# Patient Record
Sex: FEMALE | Race: WHITE | NOT HISPANIC OR LATINO | Employment: OTHER | URBAN - METROPOLITAN AREA
[De-identification: names, ages, dates, MRNs, and addresses within clinical notes are randomized per-mention and may not be internally consistent; named-entity substitution may affect disease eponyms.]

---

## 2018-01-10 ENCOUNTER — OFFICE VISIT CONVERTED (OUTPATIENT)
Dept: PODIATRY | Facility: CLINIC | Age: 53
End: 2018-01-10
Attending: PODIATRIST

## 2018-01-10 ENCOUNTER — CONVERSION ENCOUNTER (OUTPATIENT)
Dept: PODIATRY | Facility: CLINIC | Age: 53
End: 2018-01-10

## 2019-01-09 ENCOUNTER — HOSPITAL ENCOUNTER (OUTPATIENT)
Dept: CARDIOLOGY | Facility: HOSPITAL | Age: 54
Discharge: HOME | End: 2019-01-09
Attending: SURGERY

## 2019-09-12 ENCOUNTER — HOSPITAL ENCOUNTER (OUTPATIENT)
Dept: WOUND CARE | Facility: HOSPITAL | Age: 54
Setting detail: RECURRING SERIES
Discharge: STILL ADMITTED OR ADMINISTRATIVE | End: 2019-09-30
Attending: SURGERY

## 2019-09-15 LAB
AMOXICILLIN+CLAV SUSC ISLT: 16
AMOXICILLIN+CLAV SUSC ISLT: >=32
AMPICILLIN SUSC ISLT: 16
AMPICILLIN SUSC ISLT: >=32
AMPICILLIN+SULBAC SUSC ISLT: 16
AMPICILLIN+SULBAC SUSC ISLT: >=32
BACTERIA SPEC AEROBE CULT: ABNORMAL
CEFAZOLIN SUSC ISLT: >=64
CEFAZOLIN SUSC ISLT: >=64
CEFEPIME SUSC ISLT: <=1
CEFTAZIDIME SUSC ISLT: <=1
CEFTRIAXONE SUSC ISLT: <=1
CEFTRIAXONE SUSC ISLT: <=1
CEFUROXIME ORAL SUSC ISLT: <=1
CEFUROXIME ORAL SUSC ISLT: >=64
CEFUROXIME PARENTER SUSC ISLT: <=1
CEFUROXIME PARENTER SUSC ISLT: >=64
CIPROFLOXACIN SUSC ISLT: 0.5
CIPROFLOXACIN SUSC ISLT: 1
CIPROFLOXACIN SUSC ISLT: <=0.25
ERTAPENEM SUSC ISLT: <=0.5
ERTAPENEM SUSC ISLT: <=0.5
GENTAMICIN SUSC ISLT: 4
GENTAMICIN SUSC ISLT: <=1
GENTAMICIN SUSC ISLT: <=1
LEVOFLOXACIN SUSC ISLT: 1
LEVOFLOXACIN SUSC ISLT: 4
LEVOFLOXACIN SUSC ISLT: <=0.12
TETRACYCLINE SUSC ISLT: <=1
TETRACYCLINE SUSC ISLT: >=16
TMP SMX SUSC ISLT: <=20
TMP SMX SUSC ISLT: <=20
TOBRAMYCIN SUSC ISLT: 2
TOBRAMYCIN SUSC ISLT: <=1
TOBRAMYCIN SUSC ISLT: <=1

## 2019-10-14 ENCOUNTER — HOSPITAL ENCOUNTER (OUTPATIENT)
Dept: CARDIOLOGY | Facility: HOSPITAL | Age: 54
Discharge: HOME | End: 2019-10-14
Attending: SURGERY

## 2019-10-16 ENCOUNTER — HOSPITAL ENCOUNTER (OUTPATIENT)
Dept: MRI IMAGING | Facility: HOSPITAL | Age: 54
Discharge: HOME | End: 2019-10-16
Attending: SURGERY

## 2019-10-16 LAB
CREAT BLD-MCNC: 1.2 MG/DL (ref 0.6–1.4)
GFR SERPLBLD BASED ON 1.73 SQ M-ARVRAT: 50 ML/MIN/{1.73_M2}

## 2019-10-24 ENCOUNTER — HOSPITAL ENCOUNTER (OUTPATIENT)
Dept: WOUND CARE | Facility: HOSPITAL | Age: 54
Setting detail: RECURRING SERIES
Discharge: HOME | End: 2019-10-31
Attending: SURGERY

## 2019-12-10 ENCOUNTER — HOSPITAL ENCOUNTER (OUTPATIENT)
Dept: WOUND CARE | Facility: HOSPITAL | Age: 54
Setting detail: RECURRING SERIES
Discharge: HOME | End: 2019-12-31
Attending: SURGERY

## 2020-01-16 ENCOUNTER — HOSPITAL ENCOUNTER (OUTPATIENT)
Dept: WOUND CARE | Facility: HOSPITAL | Age: 55
Setting detail: RECURRING SERIES
Discharge: HOME | End: 2020-01-31
Attending: SURGERY

## 2020-02-26 ENCOUNTER — HOSPITAL ENCOUNTER (OUTPATIENT)
Dept: WOUND CARE | Facility: HOSPITAL | Age: 55
Setting detail: RECURRING SERIES
Discharge: HOME | End: 2020-05-26
Attending: SURGERY

## 2020-06-11 ENCOUNTER — HOSPITAL ENCOUNTER (OUTPATIENT)
Dept: WOUND CARE | Facility: HOSPITAL | Age: 55
Setting detail: RECURRING SERIES
Discharge: HOME | End: 2020-09-09
Attending: SURGERY

## 2020-08-21 ENCOUNTER — HOSPITAL ENCOUNTER (OUTPATIENT)
Dept: GENERAL RADIOLOGY | Facility: HOSPITAL | Age: 55
Discharge: HOME | End: 2020-08-21
Attending: SURGERY

## 2020-09-15 ENCOUNTER — HOSPITAL ENCOUNTER (OUTPATIENT)
Dept: WOUND CARE | Facility: HOSPITAL | Age: 55
Setting detail: RECURRING SERIES
Discharge: HOME | End: 2020-12-14
Attending: SURGERY

## 2020-09-28 ENCOUNTER — HOSPITAL ENCOUNTER (OUTPATIENT)
Dept: CARDIOLOGY | Facility: HOSPITAL | Age: 55
Discharge: HOME | End: 2020-09-28
Attending: SURGERY

## 2020-10-10 LAB
BACTERIA SPEC AEROBE CULT: ABNORMAL
CEFAZOLIN SUSC ISLT: >=64
CEFTAZIDIME SUSC ISLT: >=64
CIPROFLOXACIN SUSC ISLT: 1
CIPROFLOXACIN SUSC ISLT: <=0.5
CLINDAMYCIN SUSC ISLT: 0.25
CONV CEFTAZIDIME (BP): 256
CONV LEVOFLOXACIN (BP): 0.75
CONV MINOCYCLINE (BP): 0.75
CONV TICARCILLIN + CLAVULANATE (#) (BP): 256
CONV TRIMETHOPRIM + SULFAMETHOXAZOLE (#) (BP): 0.09
DAPTOMYCIN SUSC ISLT: 0.25
DOXYCYCLINE SUSC ISLT: <=0.5
ERYTHROMYCIN SUSC ISLT: 0.5
GENTAMICIN SUSC ISLT: >=16
GENTAMICIN SUSC ISLT: >=16
LEVOFLOXACIN SUSC ISLT: 0.5
LEVOFLOXACIN SUSC ISLT: 1
LEVOFLOXACIN SUSC ISLT: <=0.12
OXACILLIN SUSC ISLT: <=0.25
PIP+TAZO SUSC ISLT: 64
RIFAMPIN SUSC ISLT: <=0.5
TETRACYCLINE SUSC ISLT: <=1
TIGECYCLINE SUSC ISLT: <=0.12
TMP SMX SUSC ISLT: <=10
TMP SMX SUSC ISLT: <=20
TMP SMX SUSC ISLT: >=320
TOBRAMYCIN SUSC ISLT: >=16
VANCOMYCIN SUSC ISLT: <=0.5

## 2020-10-23 LAB
BACTERIA SPEC AEROBE CULT: ABNORMAL
CIPROFLOXACIN SUSC ISLT: <=0.5
CLINDAMYCIN SUSC ISLT: 0.25
DAPTOMYCIN SUSC ISLT: 0.25
DOXYCYCLINE SUSC ISLT: <=0.5
ERYTHROMYCIN SUSC ISLT: 0.5
GENTAMICIN SUSC ISLT: >=16
LEVOFLOXACIN SUSC ISLT: 0.25
OXACILLIN SUSC ISLT: <=0.25
RIFAMPIN SUSC ISLT: <=0.5
TETRACYCLINE SUSC ISLT: <=1
TIGECYCLINE SUSC ISLT: <=0.12
TMP SMX SUSC ISLT: 20
VANCOMYCIN SUSC ISLT: 1

## 2020-10-29 ENCOUNTER — HOSPITAL ENCOUNTER (OUTPATIENT)
Dept: CT IMAGING | Facility: HOSPITAL | Age: 55
Discharge: HOME | End: 2020-10-29
Attending: SURGERY

## 2020-11-02 LAB
GLUCOSE BLD-MCNC: 202 MG/DL (ref 65–99)
GLUCOSE BLD-MCNC: 246 MG/DL (ref 65–99)

## 2020-11-03 LAB
GLUCOSE BLD-MCNC: 144 MG/DL (ref 65–99)
GLUCOSE BLD-MCNC: 211 MG/DL (ref 65–99)

## 2020-11-05 ENCOUNTER — OFFICE VISIT CONVERTED (OUTPATIENT)
Dept: ORTHOPEDIC SURGERY | Facility: CLINIC | Age: 55
End: 2020-11-05
Attending: PHYSICIAN ASSISTANT

## 2020-11-06 ENCOUNTER — HOSPITAL ENCOUNTER (OUTPATIENT)
Dept: OTHER | Facility: HOSPITAL | Age: 55
Discharge: HOME | End: 2020-11-06
Attending: SURGERY

## 2020-11-06 LAB — PREALB SERPL-MCNC: 24.9 MG/DL (ref 20–40)

## 2020-11-09 LAB — GLUCOSE BLD-MCNC: 73 MG/DL (ref 65–99)

## 2020-11-10 LAB
GLUCOSE BLD-MCNC: 122 MG/DL (ref 65–99)
GLUCOSE BLD-MCNC: 83 MG/DL (ref 65–99)

## 2020-11-11 LAB
GLUCOSE BLD-MCNC: 101 MG/DL (ref 65–99)
GLUCOSE BLD-MCNC: 94 MG/DL (ref 65–99)

## 2020-11-12 LAB
GLUCOSE BLD-MCNC: 140 MG/DL (ref 65–99)
GLUCOSE BLD-MCNC: 165 MG/DL (ref 65–99)
GLUCOSE BLD-MCNC: 172 MG/DL (ref 65–99)

## 2020-11-13 LAB
GLUCOSE BLD-MCNC: 112 MG/DL (ref 65–99)
GLUCOSE BLD-MCNC: 126 MG/DL (ref 65–99)

## 2020-11-17 ENCOUNTER — OFFICE VISIT CONVERTED (OUTPATIENT)
Dept: ORTHOPEDIC SURGERY | Facility: CLINIC | Age: 55
End: 2020-11-17
Attending: PHYSICIAN ASSISTANT

## 2020-11-17 LAB
AMPICILLIN SUSC ISLT: <=0.25
BACTERIA SPEC AEROBE CULT: ABNORMAL
CEFAZOLIN SUSC ISLT: 32
CEFAZOLIN SUSC ISLT: >=64
CEFEPIME SUSC ISLT: <=0.12
CEFOTAXIME SUSC ISLT: <=0.12
CEFTAZIDIME SUSC ISLT: 4
CEFTAZIDIME SUSC ISLT: <=1
CEFTRIAXONE SUSC ISLT: <=0.12
CIPROFLOXACIN SUSC ISLT: 1
CIPROFLOXACIN SUSC ISLT: <=0.25
CLINDAMYCIN SUSC ISLT: >=1
ERTAPENEM SUSC ISLT: <=0.12
ERYTHROMYCIN SUSC ISLT: >=8
GENTAMICIN SUSC ISLT: <=1
GENTAMICIN SUSC ISLT: <=1
GLUCOSE BLD-MCNC: 76 MG/DL (ref 65–99)
GLUCOSE BLD-MCNC: 78 MG/DL (ref 65–99)
LEVOFLOXACIN SUSC ISLT: 1
LEVOFLOXACIN SUSC ISLT: 1
LEVOFLOXACIN SUSC ISLT: <=0.12
PENICILLIN G SUSC ISLT: <=0.06
PIP+TAZO SUSC ISLT: <=4
PIP+TAZO SUSC ISLT: <=4
TETRACYCLINE SUSC ISLT: >=16
TIGECYCLINE SUSC ISLT: <=0.06
TMP SMX SUSC ISLT: 160
TMP SMX SUSC ISLT: >=320
TOBRAMYCIN SUSC ISLT: <=1
TOBRAMYCIN SUSC ISLT: <=1
VANCOMYCIN SUSC ISLT: 0.25

## 2020-11-19 LAB — GLUCOSE BLD-MCNC: 120 MG/DL (ref 65–99)

## 2020-11-30 ENCOUNTER — HOSPITAL ENCOUNTER (OUTPATIENT)
Dept: CARDIOLOGY | Facility: HOSPITAL | Age: 55
Discharge: HOME | End: 2020-11-30
Attending: SURGERY

## 2020-12-07 LAB
GLUCOSE BLD-MCNC: 100 MG/DL (ref 65–99)
GLUCOSE BLD-MCNC: 108 MG/DL (ref 65–99)
GLUCOSE BLD-MCNC: 116 MG/DL (ref 65–99)

## 2020-12-08 ENCOUNTER — OFFICE VISIT CONVERTED (OUTPATIENT)
Dept: ORTHOPEDIC SURGERY | Facility: CLINIC | Age: 55
End: 2020-12-08
Attending: PHYSICIAN ASSISTANT

## 2020-12-17 ENCOUNTER — HOSPITAL ENCOUNTER (OUTPATIENT)
Dept: WOUND CARE | Facility: HOSPITAL | Age: 55
Setting detail: RECURRING SERIES
Discharge: HOME | End: 2021-03-17
Attending: SURGERY

## 2020-12-19 LAB — BACTERIA SPEC AEROBE CULT: NORMAL

## 2021-01-10 LAB
AMPICILLIN SUSC ISLT: <=0.25
BACTERIA SPEC AEROBE CULT: ABNORMAL
CEFOTAXIME SUSC ISLT: <=0.12
CEFTRIAXONE SUSC ISLT: <=0.12
CLINDAMYCIN SUSC ISLT: >=1
ERYTHROMYCIN SUSC ISLT: >=8
LEVOFLOXACIN SUSC ISLT: 0.5
PENICILLIN G SUSC ISLT: <=0.06
TETRACYCLINE SUSC ISLT: >=16
TIGECYCLINE SUSC ISLT: 0.12
VANCOMYCIN SUSC ISLT: 0.5

## 2021-05-10 NOTE — H&P
History and Physical      Patient Name: Nasima Dubose   Patient ID: [id number]   Sex: Female   YOB: 1965    Primary Care Provider: Jack Muir MD   Referring Provider: Jack Muir MD    Visit Date: November 5, 2020    Provider: Criselda Boucher PA-C   Location: [facility]   Location Address: [address] [place] [address]  [id number]   Location Phone: [phone]          Chief Complaint  · Right shoulder/arm injury      History Of Present Illness  Nasima Dubose is a 55 year old /White female who presents today to [facility].      She is here for evaluation of right shoulder injury. She fell two night ago when she was trying to go to the bathroom in the middle of the night. She had resultant right shoulder pain. She was evaluated at [facility] ED. X-rays revealed minimally displaced proximal humerus fracture of greater tuberosity and surgical neck.     Of note, patient is currently being treated for nonhealing wound of right foot, has a wound vac. She has PAD and diabetes.       Past Medical History  Ankle pain; Arthritis; Blood clot in vein; Corns and callus; Heart attack; High blood pressure; High cholesterol; Kidney problem; Numbness in feet; Stroke; Type 1 diabetes mellitus; Ulcer         Past Surgical History  Bladder Repair; C section; Cholecstectomy; Excision of ingrown toenail; Hernia Repair; Hysterectomy; Lithotripsy; Tonsilectomy         Medication List  Advair Diskus 100-50 mcg/dose inhalation blister with device; aspirin 81 mg oral tablet,delayed release (DR/EC); Claritin 10 mg oral tablet; fluticasone 50 mcg/actuation nasal spray,suspension; furosemide 40 mg oral tablet; gabapentin 600 mg oral tablet; Humalog 100 unit/mL subcutaneous solution; isosorbide mononitrate 60 mg oral tablet extended release 24 hr; lisinopril 10 mg oral tablet; metformin 1,000 mg oral tablet; metoprolol tartrate 50 mg oral tablet; Plavix 75 mg oral tablet; ProAir HFA 90 mcg/actuation  "inhalation HFA aerosol inhaler; simvastatin 40 mg oral tablet; Symbicort 160-4.5 mcg/actuation inhalation HFA aerosol inhaler; Vistaril 25 mg oral capsule         Allergy List  Neosporin       Allergies Reconciled  Family Medical History  Heart Disease; Diabetes Mellitus, Type II         Social History  Alcohol; Tobacco (Never)         Review of Systems  · Constitutional  o Denies  o : fever, chills, weight loss  · Cardiovascular  o Denies  o : chest pain, shortness of breath  · Gastrointestinal  o Denies  o : liver disease, heartburn, nausea, blood in stools  · Genitourinary  o Denies  o : painful urination, blood in urine  · Integument  o Denies  o : rash, itching  · Neurologic  o Denies  o : headache, weakness, loss of consciousness  · Musculoskeletal  o Admits  o : painful, swollen joints  · Psychiatric  o Denies  o : drug/alcohol addiction, anxiety, depression      Vitals  Date Time BP Position Site L\R Cuff Size HR RR TEMP (F) WT  HT  BMI kg/m2 BSA m2 O2 Sat FR L/min FiO2        11/05/2020 08:46 AM      56 - R   212lbs 16oz 5'  5.5\" 34.91 2.11 96 %            Physical Examination  · Constitutional  o Appearance  o : well developed, well-nourished, no obvious deformities present  · Head and Face  o Head  o :   § Inspection  § : normocephalic  o Face  o :   § Inspection  § : no facial lesions  · Eyes  o Conjunctivae  o : conjunctivae normal  o Sclerae  o : sclerae white  · Ears, Nose, Mouth and Throat  o Ears  o :   § External Ears  § : appearance within normal limits  § Hearing  § : intact  o Nose  o :   § External Nose  § : appearance normal  · Neck  o Inspection/Palpation  o : normal appearance  o Range of Motion  o : full range of motion  · Respiratory  o Respiratory Effort  o : breathing unlabored  o Inspection of Chest  o : normal appearance  o Auscultation of Lungs  o : no audible wheezing or rales  · Cardiovascular  o Heart  o : regular rate  · Gastrointestinal  o Abdominal Examination  o : soft and " non-tender  · Skin and Subcutaneous Tissue  o General Inspection  o : intact, no rashes  · Psychiatric  o General  o : Alert and oriented x3  o Judgement and Insight  o : judgment and insight intact  o Mood and Affect  o : mood normal, affect appropriate  · Right Shoulder  o Inspection  o : No discoloration. + swelling. Tender over proximal humerus. ROM limited. Sensation grossly intact. All compartments soft and well perfused. Good ROM of elbow, wrist, hand.           Assessment  · Pain: Arm     729.5/M79.603  · Pain: Shoulder     719.41/M25.519  · Proximal humerus fracture     812.00/S42.209A      Plan  · Medications  o Medications have been Reconciled  o Transition of Care or Provider Policy  · Instructions  o Reviewed the patient's Past Medical, Social, and Family history as well as the ROS at today's visit, no changes.  o Call or return if worsening symptoms.  o Continue sling. Follow up 10-14 days for repeat x-rays. Encouraged elbow, wrist digits ROM.  o Electronically Identified Patient Education Materials Provided Electronically            Electronically Signed by: DANI Oilvier-C -Author on November 5, 2020 09:34:09 AM  Electronically Co-signed by: Daisy Duque MD -Reviewer on November 5, 2020 01:26:02 PM

## 2021-05-13 NOTE — PROGRESS NOTES
Progress Note      Patient Name: Nasima Dubose   Patient ID: [id number]   Sex: Female   YOB: 1965    Primary Care Provider: Jack Muir MD   Referring Provider: Jack Muir MD    Visit Date: December 8, 2020    Provider: Criselda Boucher PA-C   Location: [facility]   Location Address: [address] [place] [address]  [id number]   Location Phone: [phone]          Chief Complaint  · right shoulder pain      History Of Present Illness  Nasima Dubose is a 55 year old /White female who presents today to [facility].      She is here for follow up for right proximal humerus fracture sustained 11/4/20. She has been using a sling. She states pain is improved. She has been doing some gentle ROM. She does have bilateral hand peripheral neuropathy. She has foot wound and wound vac in place and has [facility] home health.             Past Medical History  Anemia, Unspecified; Ankle pain; Arthritis; Bladder Disorder; Blood clot in vein; Congestive heart failure; Corns and callus; Diabetes; Heart attack; High blood pressure; High cholesterol; Hyperlipemia; Hypertension; Kidney problem; Limb Swelling; Numbness in feet; Seasonal allergies; Stroke; Type 1 diabetes mellitus; Ulcer         Past Surgical History  Bladder Repair; C section; Cesarian Section; Cholecstectomy; Colonoscopy; Excision of ingrown toenail; Gallbladder; Hernia; Hernia Repair; Hysterectomy; Lithotripsy; Tonsilectomy; Tonsillectomy         Medication List  Advair Diskus 100-50 mcg/dose inhalation blister with device; aspirin 81 mg oral tablet,delayed release (DR/EC); Claritin 10 mg oral tablet; fluticasone 50 mcg/actuation nasal spray,suspension; furosemide 40 mg oral tablet; gabapentin 600 mg oral tablet; Humalog 100 unit/mL subcutaneous solution; isosorbide mononitrate 60 mg oral tablet extended release 24 hr; lisinopril 10 mg oral tablet; metformin 1,000 mg oral tablet; metoprolol tartrate 50 mg oral tablet;  "Plavix 75 mg oral tablet; ProAir HFA 90 mcg/actuation inhalation HFA aerosol inhaler; simvastatin 40 mg oral tablet; Symbicort 160-4.5 mcg/actuation inhalation HFA aerosol inhaler; Vistaril 25 mg oral capsule         Allergy List  Neosporin       Allergies Reconciled  Family Medical History  Stroke; Heart Disease; Cancer, Unspecified; Diabetes, unspecified type; Diabetes Mellitus, Type II; Osteoporosis; Family history of Arthritis         Social History  Alcohol; Alcohol Use; Claustophobic (Unknown); .; lives with children; Recreational Drug Use (Never); Tobacco (Former); Unemployed.         Review of Systems  · Constitutional  o Denies  o : fever, chills, weight loss  · Cardiovascular  o Denies  o : chest pain, shortness of breath  · Gastrointestinal  o Denies  o : liver disease, heartburn, nausea, blood in stools  · Genitourinary  o Denies  o : painful urination, blood in urine  · Integument  o Denies  o : rash, itching  · Neurologic  o Denies  o : headache, weakness, loss of consciousness  · Musculoskeletal  o Admits  o : painful, swollen joints  · Psychiatric  o Denies  o : drug/alcohol addiction, anxiety, depression      Vitals  Date Time BP Position Site L\R Cuff Size HR RR TEMP (F) WT  HT  BMI kg/m2 BSA m2 O2 Sat FR L/min FiO2 HC       12/08/2020 02:29 PM         203lbs 0oz 5'  5.5\" 33.27 2.06             Physical Examination  · Constitutional  o Appearance  o : well developed, well-nourished, no obvious deformities present  · Head and Face  o Head  o :   § Inspection  § : normocephalic  o Face  o :   § Inspection  § : no facial lesions  · Eyes  o Conjunctivae  o : conjunctivae normal  o Sclerae  o : sclerae white  · Ears, Nose, Mouth and Throat  o Ears  o :   § External Ears  § : appearance within normal limits  § Hearing  § : intact  o Nose  o :   § External Nose  § : appearance normal  · Neck  o Inspection/Palpation  o : normal appearance  o Range of Motion  o : full range of " motion  · Respiratory  o Respiratory Effort  o : breathing unlabored  o Inspection of Chest  o : normal appearance  o Auscultation of Lungs  o : no audible wheezing or rales  · Cardiovascular  o Heart  o : regular rate  · Gastrointestinal  o Abdominal Examination  o : soft and non-tender  · Skin and Subcutaneous Tissue  o General Inspection  o : intact, no rashes  · Psychiatric  o General  o : Alert and oriented x3  o Judgement and Insight  o : judgment and insight intact  o Mood and Affect  o : mood normal, affect appropriate  · Right Shoulder  o Inspection  o : Swollen. Tender. ROM limited. Full ROM elbow, wrist digits. Sensation intact. Distal pulses 2+.               Assessment  · Right shoulder pain, unspecified chronicity     719.41/M25.511  · Proximal humerus fracture     812.00/S42.209A      Plan  · Orders  o Scapula (Right) [facility] Preferred View ([id number]) - 719.41/M25.511 - 12/08/2020  · Medications  o Medications have been Reconciled  o Transition of Care or Provider Policy  · Instructions  o Reviewed the patient's Past Medical, Social, and Family history as well as the ROS at today's visit, no changes.  o Call or return if worsening symptoms.  o Start home PT/OT Follow up in 4 weeks.             Electronically Signed by: Criselda Boucher PA-C -Author on December 8, 2020 02:53:46 PM

## 2021-05-13 NOTE — PROGRESS NOTES
Progress Note      Patient Name: Nasima Dubose   Patient ID: [id number]   Sex: Female   YOB: 1965    Primary Care Provider: Jack Muir MD   Referring Provider: Jack Muir MD    Visit Date: November 17, 2020    Provider: Criselda Boucher PA-C   Location: [facility]   Location Address: [address] [place] [address]  [id number]   Location Phone: [phone]          Chief Complaint  · Follow up right shoulder/arm pain      History Of Present Illness  Nasima Dubose is a 55 year old /White female who presents today to [facility].      She is here for follow up for right proximal humerus fracture sustained 11/4/20. She has been using a sling. She states pain is improved. She does have bilateral hand peripheral neuropathy. She has foot wound and wound vac in place.       Past Medical History  Anemia, Unspecified; Ankle pain; Arthritis; Bladder Disorder; Blood clot in vein; Congestive heart failure; Corns and callus; Diabetes; Heart attack; High blood pressure; High cholesterol; Hyperlipemia; Hypertension; Kidney problem; Limb Swelling; Numbness in feet; Seasonal allergies; Stroke; Type 1 diabetes mellitus; Ulcer         Past Surgical History  Bladder Repair; C section; Cesarian Section; Cholecstectomy; Colonoscopy; Excision of ingrown toenail; Gallbladder; Hernia; Hernia Repair; Hysterectomy; Lithotripsy; Tonsilectomy; Tonsillectomy         Medication List  Advair Diskus 100-50 mcg/dose inhalation blister with device; aspirin 81 mg oral tablet,delayed release (DR/EC); Claritin 10 mg oral tablet; fluticasone 50 mcg/actuation nasal spray,suspension; furosemide 40 mg oral tablet; gabapentin 600 mg oral tablet; Humalog 100 unit/mL subcutaneous solution; isosorbide mononitrate 60 mg oral tablet extended release 24 hr; lisinopril 10 mg oral tablet; metformin 1,000 mg oral tablet; metoprolol tartrate 50 mg oral tablet; Plavix 75 mg oral tablet; ProAir HFA 90 mcg/actuation  "inhalation HFA aerosol inhaler; simvastatin 40 mg oral tablet; Symbicort 160-4.5 mcg/actuation inhalation HFA aerosol inhaler; Vistaril 25 mg oral capsule         Allergy List  Neosporin       Allergies Reconciled  Family Medical History  Stroke; Heart Disease; Cancer, Unspecified; Diabetes, unspecified type; Diabetes Mellitus, Type II; Osteoporosis; Family history of Arthritis         Social History  Alcohol; Alcohol Use (Never); Claustophobic (Unknown); .; lives with children; Recreational Drug Use (Never); Tobacco (Former); Unemployed.         Review of Systems  · Constitutional  o Denies  o : fever, chills, weight loss  · Cardiovascular  o Denies  o : chest pain, shortness of breath  · Gastrointestinal  o Denies  o : liver disease, heartburn, nausea, blood in stools  · Genitourinary  o Denies  o : painful urination, blood in urine  · Integument  o Denies  o : rash, itching  · Neurologic  o Denies  o : headache, weakness, loss of consciousness  · Musculoskeletal  o Admits  o : painful, swollen joints  · Psychiatric  o Denies  o : drug/alcohol addiction, anxiety, depression      Vitals  Date Time BP Position Site L\R Cuff Size HR RR TEMP (F) WT  HT  BMI kg/m2 BSA m2 O2 Sat FR L/min FiO2        11/17/2020 08:37 AM      65 - R   212lbs 16oz 5'  5.5\" 34.91 2.11 94 %            Physical Examination  · Constitutional  o Appearance  o : well developed, well-nourished, no obvious deformities present  · Head and Face  o Head  o :   § Inspection  § : normocephalic  o Face  o :   § Inspection  § : no facial lesions  · Eyes  o Conjunctivae  o : conjunctivae normal  o Sclerae  o : sclerae white  · Ears, Nose, Mouth and Throat  o Ears  o :   § External Ears  § : appearance within normal limits  § Hearing  § : intact  o Nose  o :   § External Nose  § : appearance normal  · Neck  o Inspection/Palpation  o : normal appearance  o Range of Motion  o : full range of motion  · Respiratory  o Respiratory Effort  o : " breathing unlabored  o Inspection of Chest  o : normal appearance  o Auscultation of Lungs  o : no audible wheezing or rales  · Cardiovascular  o Heart  o : regular rate  · Gastrointestinal  o Abdominal Examination  o : soft and non-tender  · Skin and Subcutaneous Tissue  o General Inspection  o : intact, no rashes  · Psychiatric  o General  o : Alert and oriented x3  o Judgement and Insight  o : judgment and insight intact  o Mood and Affect  o : mood normal, affect appropriate  · Right Shoulder  o Inspection  o : Swollen. Tender. ROM limited. Full ROM elbow, wrist digits. Sensation intact. Distal pulses 2+.  · In Office Procedures  o View  o : AP/LATERAL  o Site  o : right, shoulder   o Indication  o : Right shoulder pain   o Study  o : X-rays ordered, taken in the office, and reviewed today.  o Xray  o : Stable fracture of surgical neck and greater tuberosity of humerus with minimal displacement  o Comparative Data  o : Comparative Data found and reviewed today           Assessment  · Pain: Shoulder     719.41/M25.519  · Proximal humerus fracture     812.00/S42.209A      Plan  · Orders  o Shoulder (Right) [facility] Preferred View ([id number]) - 719.41/M25.519 - 11/17/2020  · Medications  o Medications have been Reconciled  o Transition of Care or Provider Policy  · Instructions  o Reviewed the patient's Past Medical, Social, and Family history as well as the ROS at today's visit, no changes.  o Call or return if worsening symptoms.  o Continue sling, with removal for ROM exercises, which I demonstrated. Follow up 2-3 weeks, repeat x-rays. Consider starting home PT.   o Electronically Identified Patient Education Materials Provided Electronically            Electronically Signed by: DANI Olivier-C -Author on November 17, 2020 09:17:18 AM  Electronically Co-signed by: Daisy Duque MD -Reviewer on November 17, 2020 03:07:19 PM

## 2021-05-14 VITALS — WEIGHT: 213 LBS | HEART RATE: 65 BPM | HEIGHT: 65 IN | BODY MASS INDEX: 35.49 KG/M2 | OXYGEN SATURATION: 94 %

## 2021-05-14 VITALS — OXYGEN SATURATION: 96 % | HEIGHT: 65 IN | BODY MASS INDEX: 35.49 KG/M2 | HEART RATE: 56 BPM | WEIGHT: 213 LBS

## 2021-05-14 VITALS — WEIGHT: 203 LBS | BODY MASS INDEX: 33.82 KG/M2 | HEIGHT: 65 IN

## 2021-05-16 VITALS — WEIGHT: 192 LBS | BODY MASS INDEX: 31.99 KG/M2 | HEART RATE: 72 BPM | HEIGHT: 65 IN | OXYGEN SATURATION: 98 %

## 2021-06-29 ENCOUNTER — OFFICE VISIT (OUTPATIENT)
Dept: WOUND CARE | Facility: HOSPITAL | Age: 56
End: 2021-06-29

## 2021-06-29 VITALS
DIASTOLIC BLOOD PRESSURE: 86 MMHG | WEIGHT: 213 LBS | HEART RATE: 60 BPM | TEMPERATURE: 96.7 F | HEIGHT: 65 IN | RESPIRATION RATE: 16 BRPM | BODY MASS INDEX: 35.49 KG/M2 | SYSTOLIC BLOOD PRESSURE: 134 MMHG

## 2021-06-29 DIAGNOSIS — L97.512 DIABETIC ULCER OF OTHER PART OF RIGHT FOOT ASSOCIATED WITH TYPE 2 DIABETES MELLITUS, WITH FAT LAYER EXPOSED (HCC): Primary | ICD-10-CM

## 2021-06-29 DIAGNOSIS — E11.621 DIABETIC ULCER OF OTHER PART OF RIGHT FOOT ASSOCIATED WITH TYPE 2 DIABETES MELLITUS, WITH FAT LAYER EXPOSED (HCC): Primary | ICD-10-CM

## 2021-06-29 PROCEDURE — 97597 DBRDMT OPN WND 1ST 20 CM/<: CPT | Performed by: SURGERY

## 2021-06-29 PROCEDURE — G0463 HOSPITAL OUTPT CLINIC VISIT: HCPCS | Performed by: SURGERY

## 2021-06-29 RX ORDER — FUROSEMIDE 40 MG/1
40 TABLET ORAL 2 TIMES DAILY
COMMUNITY

## 2021-06-29 RX ORDER — ALBUTEROL SULFATE 90 UG/1
2 AEROSOL, METERED RESPIRATORY (INHALATION) EVERY 4 HOURS PRN
COMMUNITY

## 2021-06-29 RX ORDER — CLOPIDOGREL BISULFATE 75 MG/1
75 TABLET ORAL DAILY
COMMUNITY
End: 2022-01-07

## 2021-06-29 RX ORDER — INSULIN GLARGINE 100 [IU]/ML
56 INJECTION, SOLUTION SUBCUTANEOUS NIGHTLY
COMMUNITY
End: 2022-01-07

## 2021-06-29 RX ORDER — INSULIN ASPART 100 [IU]/ML
10 INJECTION, SOLUTION INTRAVENOUS; SUBCUTANEOUS
COMMUNITY

## 2021-06-29 RX ORDER — HYDROCODONE BITARTRATE AND ACETAMINOPHEN 7.5; 325 MG/1; MG/1
1 TABLET ORAL EVERY 8 HOURS
COMMUNITY
Start: 2021-06-15

## 2021-06-29 RX ORDER — HYDROXYZINE PAMOATE 25 MG/1
25 CAPSULE ORAL 3 TIMES DAILY PRN
Status: ON HOLD | COMMUNITY
End: 2022-01-15

## 2021-06-29 RX ORDER — METOPROLOL TARTRATE 50 MG/1
50 TABLET, FILM COATED ORAL DAILY
COMMUNITY
End: 2022-01-07

## 2021-06-29 RX ORDER — INSULIN DEGLUDEC 200 U/ML
INJECTION, SOLUTION SUBCUTANEOUS
Status: ON HOLD | COMMUNITY
Start: 2021-06-21 | End: 2022-01-15

## 2021-06-29 RX ORDER — GABAPENTIN 800 MG/1
800 TABLET ORAL 3 TIMES DAILY
COMMUNITY

## 2021-06-29 RX ORDER — SIMVASTATIN 40 MG
40 TABLET ORAL NIGHTLY
COMMUNITY

## 2021-06-29 RX ORDER — LISINOPRIL 10 MG/1
10 TABLET ORAL 2 TIMES DAILY
Status: ON HOLD | COMMUNITY
End: 2022-01-15

## 2021-06-29 RX ORDER — PREDNISONE 20 MG/1
10 TABLET ORAL
COMMUNITY
End: 2022-01-07

## 2021-06-29 NOTE — PROGRESS NOTES
"Chief Complaint  Wound Check (DIABETIC ULCER RIGHT FOOT (BS:158))    Subjective            Objective     Vitals:    06/29/21 0900   BP: 134/86   BP Location: Left arm   Patient Position: Sitting   Pulse: 60   Resp: 16   Temp: 96.7 °F (35.9 °C)   TempSrc: Temporal   Weight: 96.6 kg (213 lb)   Height: 165.1 cm (65\")   PainSc: 0-No pain         I have reviewed the HPI and ROS as documented by MA/RN. Theo Rollins MD    Physical Exam     Wound Description:  Diabetic ulcer plantar surface of the right foot is markedly improved since her last visit.  The wound is smaller and there is less callus tissue around the wound site.  The wound now is quite superficial.  While in the office I did excise the callus from around the wound site.  The wound itself which measures just a little over 1 cm was silver nitrated.  The patient has done well with the Aquacel patches and I have asked her to continue that until I see her again I have asked that she return to the clinic in 3 weeks.    Result Review :   The following data was reviewed by: Theo Rollins MD on 06/29/2021:      Wound debridement  Performed by: Theo Rollins MD  Authorized by: Theo Rollins MD     Correct patient: Identification verified by two methods:     Verbally and Date of birth  Correct equipment as applicable    How many wounds are you performing a debridement on?:  1  Wound 1:     Provider Documentation    Debridement type:  Sharp/excisional    Other:  Alcohol    Other:  10.  Scalpel     None    Tissue debrided:  Moderate    Type tissue:  Slough and Viable/Margins    Level removed:  Shallow Full    Patient tolerance:  Good    Hemostasis achieved by:  Silver nitrate     TSA: 4cm squared debridement to subcutaneous tissue       Assessment and Plan    Diagnoses and all orders for this visit:    1. Diabetic ulcer of other part of right foot associated with type 2 diabetes mellitus, with fat layer exposed (CMS/HCC) (Primary)    Other orders  -     " Wound debridement            Follow Up   Return in about 3 weeks (around 7/20/2021).  Patient was given instructions and counseling regarding her condition or for health maintenance advice. Please see specific information pulled into the AVS if appropriate.

## 2021-06-29 NOTE — SIGNIFICANT NOTE
Epithelial %: 25-50%    Exposed Bone: no    Exposed Tendon: no    Impression: improved    Short Term Goals: INCREASE GRANULATION AND DECREASE SIZE

## 2021-07-26 ENCOUNTER — OFFICE VISIT (OUTPATIENT)
Dept: WOUND CARE | Facility: HOSPITAL | Age: 56
End: 2021-07-26

## 2021-07-26 VITALS
BODY MASS INDEX: 35.49 KG/M2 | WEIGHT: 213 LBS | DIASTOLIC BLOOD PRESSURE: 84 MMHG | HEART RATE: 56 BPM | TEMPERATURE: 97.3 F | RESPIRATION RATE: 18 BRPM | HEIGHT: 65 IN | SYSTOLIC BLOOD PRESSURE: 134 MMHG

## 2021-07-26 DIAGNOSIS — L97.512 DIABETIC ULCER OF OTHER PART OF RIGHT FOOT ASSOCIATED WITH TYPE 2 DIABETES MELLITUS, WITH FAT LAYER EXPOSED (HCC): Primary | ICD-10-CM

## 2021-07-26 DIAGNOSIS — E11.621 DIABETIC ULCER OF OTHER PART OF RIGHT FOOT ASSOCIATED WITH TYPE 2 DIABETES MELLITUS, WITH FAT LAYER EXPOSED (HCC): Primary | ICD-10-CM

## 2021-07-26 PROCEDURE — 97597 DBRDMT OPN WND 1ST 20 CM/<: CPT | Performed by: SURGERY

## 2021-07-26 NOTE — PROGRESS NOTES
"Chief Complaint  Wound Check (DM RIGHT FOOT ULCER (BS: 74))    Subjective            Objective     Vitals:    21 1037   BP: 134/84   BP Location: Left arm   Patient Position: Sitting   Pulse: 56   Resp: 18   Temp: 97.3 °F (36.3 °C)   TempSrc: Temporal   Weight: 96.6 kg (213 lb)   Height: 165.1 cm (65\")   PainSc: 0-No pain         I have reviewed the HPI and ROS as documented by MA/RN. Theo Rollins MD    Physical Exam     Wound Description:  Diabetic ulcer plantar surface of the right foot is much smaller.  The wound site is very superficial.  There is callus around the wound site which I excised today.  The patient continues to use Aquacel patch to the wound on a daily basis.  I have asked her to continue that treatment and return to see me in 3 weeks.    Result Review :   The following data was reviewed by: Thoe Rollins MD on 2021:      Wound debridement  Performed by: Theo Rollins MD  Authorized by: Theo Rollins MD   Associated Wounds:   Wound 21 1328 Right medial foot    Correct patient: Identification verified by two methods:     Verbally and Date of birth  Correct procedure/consent    How many wounds are you performing a debridement on?:  1  Wound 1:     Nursing Documentation:     Wound Location:  RIGHT FOOT  Measurements:     Length:  1.7 cm    Width:  1.7 cm    Depth:  0.2 cm    The total amount debrided on this wound was under 20 cm2.     Tunnelin    Underminin    Culture: No      Stage/Type:  FULL    Photo: Yes    Legend:    Granulation %:     %: 4     Epithelial %:     1-25%: 2     Slough %:     0%: 1     Eschar %:     0%: 1      Exposed bone: No      Exposed tendon: No      Odor: No      Drainage: Yes      Characteristic:  Serous and Serosang    Drainage amount:  Moderate    Wound edges open: Yes      Periwound:  Callous    Impression:  Improved    Short Term Goal:  INCREASE GRANULATION AND DECREASE SIZE    Wound Cleanser:  NORMAL SALINE    Primary " Dressing:  SILVER NITRATE(APPLIED BY MD), AQUACEL AG    Secondary Dressing:  GAUZE, OPTIFOAM    Instruction given for:  Wound Care, Skin Care, Prevention and Nutrition    Provider Documentation    Debridement type:  Sharp/excisional    Other:  Alcohol    Other:  10.  Scalpel     None    Tissue debrided:  Moderate    Type tissue:  Slough    Level removed:  Shallow Full    Patient tolerance:  Good    Hemostasis achieved by:  Silver nitrate    Wound Bed Post Debridement: See Photo          TSA 2.89 cm squared of skin/ subcutaneous tissued debridement completed    Assessment and Plan    Diagnoses and all orders for this visit:    1. Diabetic ulcer of other part of right foot associated with type 2 diabetes mellitus, with fat layer exposed (CMS/HCC) (Primary)    Other orders  -     Wound debridement            Follow Up   Return in about 3 weeks (around 8/16/2021).  Patient was given instructions and counseling regarding her condition or for health maintenance advice. Please see specific information pulled into the AVS if appropriate.

## 2021-07-26 NOTE — SIGNIFICANT NOTE
Epithelial %: 0%    Exposed Bone: no    Exposed Tendon: no    Impression: improved    Short Term Goals: INCREASE GRANULATION AND DECREASE SIZE

## 2021-08-23 ENCOUNTER — OFFICE VISIT (OUTPATIENT)
Dept: WOUND CARE | Facility: HOSPITAL | Age: 56
End: 2021-08-23

## 2021-08-23 VITALS
HEART RATE: 58 BPM | HEIGHT: 65 IN | SYSTOLIC BLOOD PRESSURE: 132 MMHG | WEIGHT: 213 LBS | TEMPERATURE: 96.9 F | DIASTOLIC BLOOD PRESSURE: 80 MMHG | RESPIRATION RATE: 16 BRPM | BODY MASS INDEX: 35.49 KG/M2

## 2021-08-23 DIAGNOSIS — L97.512 DIABETIC ULCER OF OTHER PART OF RIGHT FOOT ASSOCIATED WITH TYPE 2 DIABETES MELLITUS, WITH FAT LAYER EXPOSED (HCC): Primary | ICD-10-CM

## 2021-08-23 DIAGNOSIS — E11.621 DIABETIC ULCER OF OTHER PART OF RIGHT FOOT ASSOCIATED WITH TYPE 2 DIABETES MELLITUS, WITH FAT LAYER EXPOSED (HCC): Primary | ICD-10-CM

## 2021-08-23 PROCEDURE — 97597 DBRDMT OPN WND 1ST 20 CM/<: CPT | Performed by: EMERGENCY MEDICINE

## 2021-08-23 NOTE — PROGRESS NOTES
Chief Complaint  Wound Check (DM RIGHT FOOT ULCER)    Subjective        History of Present Illness    Is a 56-year-old female who has been followed by the wound care clinic for a RLE diabetic foot ulcer.  She has been applying Aquacel Ag to this area and it has gradually been getting better.  Her diabetes is reasonably well controlled with her most recent hemoglobin A1c in April being 6.5.  She also has a history of peripheral vascular disease and has been seen by Dr. Henson in the past.    Allergies:  Aminoglycosides, Neosporin [neomycin-bacitracin zn-polymyx], Polymyxin b, and Pramoxine      Current Outpatient Medications:   •  albuterol sulfate HFA (ProAir HFA) 108 (90 Base) MCG/ACT inhaler, ProAir HFA 90 mcg/actuation inhalation HFA aerosol inhaler inhale 1 puff (90 mcg) by inhalation route every 6 hours as needed   Active, Disp: , Rfl:   •  clopidogrel (Plavix) 75 MG tablet, Take 75 mg by mouth Daily., Disp: , Rfl:   •  furosemide (LASIX) 40 MG tablet, Take 40 mg by mouth Daily., Disp: , Rfl:   •  gabapentin (NEURONTIN) 800 MG tablet, Take 800 mg by mouth 3 (Three) Times a Day., Disp: , Rfl:   •  HYDROcodone-acetaminophen (NORCO) 7.5-325 MG per tablet, Take 1 tablet by mouth Every 8 (Eight) Hours., Disp: , Rfl:   •  hydrOXYzine pamoate (Vistaril) 25 MG capsule, Take 25 mg by mouth 3 (Three) Times a Day As Needed for Anxiety., Disp: , Rfl:   •  insulin aspart (NovoLOG FlexPen) 100 UNIT/ML solution pen-injector sc pen, Novolog Flexpen U-100 Insulin aspart 100 unit/mL (3 mL) subcutaneous, Disp: , Rfl:   •  Insulin Glargine (BASAGLAR KWIKPEN) 100 UNIT/ML injection pen, Inject 56 Units under the skin into the appropriate area as directed Every Night., Disp: , Rfl:   •  lisinopril (PRINIVIL,ZESTRIL) 10 MG tablet, Take 10 mg by mouth 2 (two) times a day., Disp: , Rfl:   •  metFORMIN (GLUCOPHAGE) 1000 MG tablet, Take 1,000 mg by mouth 2 (two) times a day., Disp: , Rfl:   •  metoprolol tartrate (LOPRESSOR) 50 MG tablet,  Take 50 mg by mouth Daily., Disp: , Rfl:   •  predniSONE (DELTASONE) 20 MG tablet, Take 10 mg by mouth., Disp: , Rfl:   •  simvastatin (ZOCOR) 40 MG tablet, Take 40 mg by mouth Every Night., Disp: , Rfl:   •  Tresiba FlexTouch 200 UNIT/ML solution pen-injector pen injection, INJECT 66 UNITS SUBCUTANEOUSLY AT BEDTIME, Disp: , Rfl:     Past Medical History:   Diagnosis Date   • Anxiety     ANIXETY ATTACK AT WORK ABOUT A MONTH AGO   • Arthritis    • Condition not found     PSYCHIATRIC PROBLEMS- PT TOOK AN OVERDOSE OF MEDICATION FOR BLOOD SUGAR. TOOK 8 PILLS.   • Condition not found     HERBS USED- BC POWDER AS NEEDED   • Congestive heart failure (CHF) (CMS/HCC)    • COPD (chronic obstructive pulmonary disease) (CMS/HCC)    • Diabetes (CMS/HCC)    • Diabetic neuropathy (CMS/HCC)    • Health care home, active care coordination     [facility] (FAX: [phone])   • History of anesthesia reaction     HARD TIME WAKING UP SOMETIME   • History of hiatal hernia    • History of kidney stones    • Hyperlipidemia    • Hypertension    • Irregular heartbeat     PT IS SEEING  (RECENTLY DID A 24-HOUR HEART MONITOR).   • Peripheral artery disease (CMS/HCC)    • Stroke (CMS/HCC)     PIN STROKE FROMA MIGRAINE/ 20 YEARS AGO   • Wears glasses      Past Surgical History:   Procedure Laterality Date   • ABDOMINAL SURGERY      33 HERNIA REPAIR, GALLBLADER REMOVED   • BLADDER REPAIR     • CHOLECYSTECTOMY     • DILATATION AND CURETTAGE      X2   • HERNIA REPAIR     • HYSTERECTOMY  1992    2 CS   • TOE AMPUTATION Right 02/14/2017    RIGHT GREAT TOE   • TONSILLECTOMY       Social History     Socioeconomic History   • Marital status:      Spouse name: Not on file   • Number of children: Not on file   • Years of education: Not on file   • Highest education level: Not on file   Tobacco Use   • Smoking status: Former Smoker   • Smokeless tobacco: Never Used   • Tobacco comment: QUIT 4 YEARS AGO/ RECENLTY STARTED AGAIN 3 CIGS A DAY   "  Substance and Sexual Activity   • Alcohol use: Not Currently   • Drug use: Not Currently     Family History   Problem Relation Age of Onset   • Diabetes Mother    • Anesthesia problems Mother         HARD TIME WAKING UP SOMETIMES          Objective     Vitals:    08/23/21 1049   BP: 132/80   BP Location: Left arm   Patient Position: Sitting   Pulse: 58   Resp: 16   Temp: 96.9 °F (36.1 °C)   TempSrc: Temporal   Weight: 96.6 kg (213 lb)   Height: 165.1 cm (65\")   PainSc: 0-No pain     Body mass index is 35.45 kg/m².    STEADI Fall Risk Assessment has not been completed.     Review of Systems     ROS:  No fevers, chills, sweats, or body aches. No shortness of breath.     I have reviewed the HPI and ROS as documented by MA/RN. Shamika Whitehead MD    Physical Exam     NAD, well dressed, well nourished  Normocephalic, atraumatic  EOMI, no scleral icterus  No respiratory distress, no cough  AAOx3, pleasant, cooperative          Result Review :  The following data was reviewed by: Shamika Whitehead MD on 08/23/2021:    Wound Avatar Documentation    Active Wound LDAs             Wound 06/29/21 1328 Right medial foot    Placement date:   06/29/21   - 06/29/21 1328       Placement time:   1328   - 06/29/21 1328 Days:  54    Present on Hospital Admission:   Y   - 06/29/21 1328 Side:   Right   - 06/29/21 1328   Orientation:   medial   - 06/29/21 1328 Location:   foot   - 06/29/21 1328       Assessments         08/23/21 1155   08/23/21 1057   07/26/21 1139   07/26/21 1042   06/29/21 1340     (Flowsheet Note)  (Flowsheet Note)  (Flowsheet Note)  (Flowsheet Note)     Wound Image  Images linked: 1   - 08/23/21 1157     Images linked: 1   - 08/23/21 1059     Images linked: 1   - 07/26/21 1142     Images linked: 1   - 07/26/21 1049     Images linked: 1   - 06/29/21 1342      Dressing Appearance   other (see comments) POST DEBRIDEMENT   - 08/23/21 1157   moist drainage;intact   - 08/23/21 1059   moist " drainage;intact   - 07/26/21 1142   moist drainage;intact   - 07/26/21 1049   --    Closure   --    --    --    --    --    Base   --    --    --    --    --    Black (%), Wound Tissue Color   0   - 08/23/21 1157   0   - 08/23/21 1059   -- POST DEBRIDEMENT: SILVER NITRATE APPLIED BY MD   - 07/26/21 1142   0   - 07/26/21 1049   --    Red (%), Wound Tissue Color   100   - 08/23/21 1157   75   - 08/23/21 1059   -- POST DEBRIDEMENT: SILVER NITRATE APPLIED BY MD   - 07/26/21 1142   100   - 07/26/21 1049   100   - 06/29/21 1342   Yellow (%), Wound Tissue Color   0   - 08/23/21 1157   25   - 08/23/21 1059   -- POST DEBRIDEMENT: SILVER NITRATE APPLIED BY MD   - 07/26/21 1142   0   - 07/26/21 1049   --    Periwound   intact   - 08/23/21 1157   intact;dry   - 08/23/21 1059   intact   - 07/26/21 1142   dry;intact   - 07/26/21 1049   --    Periwound Temperature   warm   - 08/23/21 1157   warm   - 08/23/21 1059   warm   - 07/26/21 1142   warm   - 07/26/21 1049   warm   - 06/29/21 1342   Periwound Skin Turgor   soft   - 08/23/21 1157   firm   - 08/23/21 1059   soft   - 07/26/21 1142   firm   - 07/26/21 1049   firm   - 06/29/21 1342   Edges   open   - 08/23/21 1157   callused;open   - 08/23/21 1059   open   - 07/26/21 1142   open   - 07/26/21 1049   callused   - 06/29/21 1342   Wound Length (cm)   1.5 cm   - 08/23/21 1157   1.6 cm   - 08/23/21 1059   1.7 cm   - 07/26/21 1142   1.6 cm   - 07/26/21 1049   2 cm   -BA 06/29/21 1342   Wound Width (cm)   1.3 cm   -MADHAVI 08/23/21 1157   1.3 cm   -MADHAVI 08/23/21 1059   1.7 cm   -MADHAVI 07/26/21 1142   1.9 cm   -MADHAVI 07/26/21 1049   2 cm   -BA 06/29/21 1342   Wound Depth (cm)   0.2 cm   -MADHAVI 08/23/21 1157   0.2 cm   -MADHAVI 08/23/21 1059   0.2 cm   -MADHAVI 07/26/21 1142   0.3 cm   -MADHAVI 07/26/21 1049   0.2 cm   -BA 06/29/21 1342   Tunneling [Depth (cm)/Location]   0   -MADHAVI 08/23/21 1157   0   -MADHAVI 08/23/21 1059   0   -MADHAVI 07/26/21 1142   0    - 07/26/21 1049   --    Undermining [Depth (cm)/Location]   0   - 08/23/21 1157   0   - 08/23/21 1059   0   - 07/26/21 1142   0   - 07/26/21 1049   --    Drainage Characteristics/Odor   sanguineous   - 08/23/21 1157   serous;serosanguineous   - 08/23/21 1059   bleeding controlled   - 07/26/21 1142   serous;serosanguineous   - 07/26/21 1049   bleeding controlled   - 06/29/21 1342   Drainage Amount   small   - 08/23/21 1157   moderate   - 08/23/21 1059   none   - 07/26/21 1142   moderate   - 07/26/21 1049   --    Care, Wound   cleansed with;sterile normal saline   - 08/23/21 1157   cleansed with;sterile normal saline   - 08/23/21 1059   cleansed with;sterile normal saline   - 07/26/21 1142   cleansed with;sterile normal saline   - 07/26/21 1049   cleansed with;sterile normal saline   - 06/29/21 1342   Dressing Care   dressing applied;other (see comments) AQUACEL AG, MEPILEX   - 08/23/21 1157   --    dressing applied;other (see comments) SILVER NITRATE APPLIED BY MD, AQUACEL , GAUZE, OPTIFOAM   - 07/26/21 1142   --    dressing applied;hydrofiber;silver impregnated;gauze   - 06/29/21 1342   Periwound Care   dry periwound area maintained   - 08/23/21 1157   --    dry periwound area maintained   - 07/26/21 1142   --    --    Adhesive Closure Strips   --    --    --    --    --    Number of Adhesive Closure Strips   --    --    --    --    --    Sutures Removed Intact   --    --    --    --    --    Number of Sutures Removed   --    --    --    --    --    Staples Removed Intact   --    --    --    --    --    Number of Staples Removed   --    --    --    --    --    Wound Output (mL)   --    --    --    --    --            06/29/21 1329                     (Flowsheet Note)           Wound Image  Images linked: 1   -MADHAVI 06/29/21 1331              Dressing Appearance   moist drainage;intact   - 06/29/21 1331           Closure   --            Base   --            Black (%),  Wound Tissue Color   0   - 06/29/21 1331           Red (%), Wound Tissue Color   50   - 06/29/21 1331           Yellow (%), Wound Tissue Color   0   - 06/29/21 1331           Periwound   intact   - 06/29/21 1331           Periwound Temperature   warm   - 06/29/21 1331           Periwound Skin Turgor   firm   - 06/29/21 1331           Edges   callused   - 06/29/21 1331           Wound Length (cm)   2 cm   - 06/29/21 1331           Wound Width (cm)   2 cm   - 06/29/21 1331           Wound Depth (cm)   0.4 cm   - 06/29/21 1331           Tunneling [Depth (cm)/Location]   0   - 06/29/21 1331           Undermining [Depth (cm)/Location]   0   - 06/29/21 1331           Drainage Characteristics/Odor   serous   - 06/29/21 1331           Drainage Amount   moderate   - 06/29/21 1331           Care, Wound   cleansed with;sterile normal saline   - 06/29/21 1331           Dressing Care   --            Periwound Care   --            Adhesive Closure Strips   --            Number of Adhesive Closure Strips   --            Sutures Removed Intact   --            Number of Sutures Removed   --            Staples Removed Intact   --            Number of Staples Removed   --            Wound Output (mL)   --                           User Key  (r) = Recorded By, (t) = Taken By, (c) = Cosigned By    Initials Name Effective Dates Provider Type Discipline    Audra Henderson, RN 06/09/21 -  Registered Nurse Nurse    Zara Hemphill RN 06/09/21 -  Registered Nurse --                Wound debridement  Performed by: Shamika Whitehead MD  Authorized by: Shamika Whitehead MD   Associated Wounds:   Wound 06/29/21 1328 Right medial foot    Correct patient: Identification verified by two methods:     Verbally and Date of birth  Correct procedure/consent    Correct site/side    Correct equipment as applicable    How many wounds are you performing a debridement on?:  1  Wound 1:     Nursing Documentation:     Wound  Location:  RIGHT FOOT  Measurements:     Length:  1.5 cm    Width:  1.3 cm    Depth:  0.2 cm    The total amount debrided on this wound was under 20 cm2.     Underminin    Culture: No      Stage/Type:  FULL    Photo: Yes    Legend:    Granulation %:     %: 4     Epithelial %:     0%: 1     Slough %:     0%: 1     Eschar %:     0%: 1      Exposed bone: No      Exposed tendon: No      Odor: No      Drainage: Yes      Characteristic:  Serous and Serosang    Drainage amount:  Moderate    Wound edges open: Yes      Periwound:  Callous    Impression:  Improved    Short Term Goal:  INCREASE GRANULATION AND DECREASE SIZE    Wound Cleanser:  NORMAL SALINE    Primary Dressing:  AQUACEL AG    Secondary Dressing:  MEPILEX    Instruction given for:  Wound Care, Skin Care, Prevention and Nutrition    Provider Documentation    Debridement type:  Sharp/excisional    Betadine      Other:  Sterile 10 blade scalpel     None    Tissue debrided:  Small    Type tissue:  Other    Other tissue type:  Skin callus and nonviable epidermis    Level removed:  Partial    Patient tolerance:  Good    Hemostasis achieved by:  Direct pressure    Wound Bed Post Debridement: See Photo      Description:  Skin callus and nonviable epidermis                Assessment and Plan   Diagnoses and all orders for this visit:    1. Diabetic ulcer of other part of right foot associated with type 2 diabetes mellitus, with fat layer exposed (CMS/HCC) (Primary)  -     Wound debridement      Continue Aquacel Ag and trying to offload this area.  It appears to be improving and there is no evidence of infection.  Return in 3 weeks for recheck.    Patient was given instructions and counseling regarding their condition or for health maintenance advice, as well as the wound care plan and recommendations. They understand and agree with the plan.  They will follow back up here in the clinic but are instructed to contact us in the interim should they have any new,  returning, or worsening symptoms or concerns. Please see specific information pulled into the AVS if appropriate.       Follow Up   Return in about 3 weeks (around 9/13/2021) for Recheck.      Shamika Whitehead MD

## 2021-09-17 ENCOUNTER — OFFICE VISIT (OUTPATIENT)
Dept: WOUND CARE | Facility: HOSPITAL | Age: 56
End: 2021-09-17

## 2021-09-17 VITALS
SYSTOLIC BLOOD PRESSURE: 140 MMHG | TEMPERATURE: 97.1 F | HEART RATE: 73 BPM | HEIGHT: 65 IN | DIASTOLIC BLOOD PRESSURE: 82 MMHG | RESPIRATION RATE: 18 BRPM | BODY MASS INDEX: 35.49 KG/M2 | WEIGHT: 213 LBS

## 2021-09-17 DIAGNOSIS — L97.511 DIABETIC ULCER OF OTHER PART OF RIGHT FOOT ASSOCIATED WITH TYPE 2 DIABETES MELLITUS, LIMITED TO BREAKDOWN OF SKIN (HCC): Primary | ICD-10-CM

## 2021-09-17 DIAGNOSIS — E11.621 DIABETIC ULCER OF OTHER PART OF RIGHT FOOT ASSOCIATED WITH TYPE 2 DIABETES MELLITUS, LIMITED TO BREAKDOWN OF SKIN (HCC): Primary | ICD-10-CM

## 2021-09-17 PROCEDURE — 97597 DBRDMT OPN WND 1ST 20 CM/<: CPT | Performed by: SURGERY

## 2021-09-17 RX ORDER — METOPROLOL SUCCINATE 50 MG/1
50 TABLET, EXTENDED RELEASE ORAL DAILY
Status: ON HOLD | COMMUNITY
Start: 2021-09-03 | End: 2022-01-15

## 2021-09-17 NOTE — SIGNIFICANT NOTE
Epithelial %: %    Exposed Bone: no    Exposed Tendon: no    Impression: improved    Short Term Goals: INCREASE GRANULATION AND DECREASE SIZE

## 2021-09-17 NOTE — PROGRESS NOTES
"Chief Complaint  Wound Check (dm foot ulcer)    Subjective            Objective     Vitals:    09/17/21 1022   BP: 140/82   BP Location: Left arm   Patient Position: Sitting   Pulse: 73   Resp: 18   Temp: 97.1 °F (36.2 °C)   TempSrc: Temporal   Weight: 96.6 kg (213 lb)   Height: 165.1 cm (65\")   PainSc: 0-No pain         I have reviewed the HPI and ROS as documented by MA/RN. Theo Rollins MD    Physical Exam     Wound Description:  The diabetic ulcer on the plantar surface of the right foot is remarkably smaller today.  This wound is very superficial.  The callus around the wound site was excised.  The wound itself was silver nitrated.  I have asked the patient to continue to use Aquacel daily as she has been doing and with great success.  I have asked that she return to see me again in 3 weeks.  Result Review :   The following data was reviewed by: Theo Rollins MD on 09/17/2021:      Wound debridement  Performed by: Theo Rollins MD  Authorized by: Theo Rollins MD     Correct patient: Identification verified by two methods:     Verbally and Date of birth  Correct procedure/consent    How many wounds are you performing a debridement on?:  1  Wound 1:     Provider Documentation    Debridement type:  Sharp/excisional    Other:  Alcohol    Other:  15.  Scalpel     None    Tissue debrided:  Moderate    Type tissue:  Slough    Level removed:  Skin    Patient tolerance:  Good    Hemostasis achieved by:  Silver nitrate    Wound Bed Post Debridement: See Photo      Description:  Total surface area was 1.5 cm² with callus skin removed            Assessment and Plan    Diagnoses and all orders for this visit:    1. Diabetic ulcer of other part of right foot associated with type 2 diabetes mellitus, limited to breakdown of skin (CMS/HCC) (Primary)    Other orders  -     Wound debridement            Follow Up   Return in about 3 weeks (around 10/8/2021).  Patient was given instructions and counseling " regarding her condition or for health maintenance advice. Please see specific information pulled into the AVS if appropriate.

## 2021-10-08 ENCOUNTER — OFFICE VISIT (OUTPATIENT)
Dept: WOUND CARE | Facility: HOSPITAL | Age: 56
End: 2021-10-08

## 2021-10-08 VITALS
DIASTOLIC BLOOD PRESSURE: 88 MMHG | OXYGEN SATURATION: 92 % | TEMPERATURE: 97 F | RESPIRATION RATE: 18 BRPM | HEART RATE: 72 BPM | SYSTOLIC BLOOD PRESSURE: 152 MMHG

## 2021-10-08 DIAGNOSIS — L97.511 DIABETIC ULCER OF OTHER PART OF RIGHT FOOT ASSOCIATED WITH TYPE 2 DIABETES MELLITUS, LIMITED TO BREAKDOWN OF SKIN (HCC): Primary | ICD-10-CM

## 2021-10-08 DIAGNOSIS — E11.621 DIABETIC ULCER OF OTHER PART OF RIGHT FOOT ASSOCIATED WITH TYPE 2 DIABETES MELLITUS, LIMITED TO BREAKDOWN OF SKIN (HCC): Primary | ICD-10-CM

## 2021-10-08 PROCEDURE — 87205 SMEAR GRAM STAIN: CPT

## 2021-10-08 PROCEDURE — 87186 SC STD MICRODIL/AGAR DIL: CPT

## 2021-10-08 PROCEDURE — 87070 CULTURE OTHR SPECIMN AEROBIC: CPT

## 2021-10-08 PROCEDURE — 97597 DBRDMT OPN WND 1ST 20 CM/<: CPT | Performed by: SURGERY

## 2021-10-08 NOTE — PROGRESS NOTES
Chief Complaint  Wound Check (RIGHT FOOT ULCER, PLUS NEW AREA; DM, BG )    Subjective            Objective     Vitals:    10/08/21 1337   BP: 152/88   BP Location: Left arm   Patient Position: Sitting   Pulse: 72   Resp: 18   Temp: 97 °F (36.1 °C)   TempSrc: Temporal   SpO2: 92%         I have reviewed the HPI and ROS as documented by MA/RN. Theo Rollins MD    Physical Exam     Wound Description:  The diabetic ulcer on the plantar surface of the right foot continues to be smaller.  There is a large heapt of callus around the wound site which I did remove today.  Patient states there has been a little drainage from the wound and so the wound was cultured today.  I applied gentamicin to the wound after removing the callus today.  I do want the patient to continue with Aquacel patch on a daily basis.  Patient's been asked to return to see me in 2 weeks.  Result Review :   The following data was reviewed by: Theo Rollins MD on 10/08/2021:      Wound debridement  Performed by: Theo Rollins MD  Authorized by: Theo Rollins MD     Correct patient: Identification verified by two methods:     Verbally and Date of birth  Correct procedure/consent    How many wounds are you performing a debridement on?:  1  Wound 1:     Provider Documentation    Debridement type:  Sharp/excisional    Other:  Alcohol    Other:  15 scalpel     None    Tissue debrided:  Moderate    Type tissue:  Slough    Level removed:  Skin    Patient tolerance:  Good    Hemostasis achieved by:  Silver nitrate    Wound Bed Post Debridement: See Photo      Description:  Total surface area of the wound where a large element of callus was removed was 7.56 cm²            Assessment and Plan    Diagnoses and all orders for this visit:    1. Diabetic ulcer of other part of right foot associated with type 2 diabetes mellitus, limited to breakdown of skin (HCC) (Primary)    Other orders  -     Wound debridement            Follow Up   Return in  about 2 weeks (around 10/22/2021).  Patient was given instructions and counseling regarding her condition or for health maintenance advice. Please see specific information pulled into the AVS if appropriate.

## 2021-10-08 NOTE — SIGNIFICANT NOTE
Epithelial %: 0%    Exposed Bone: no    Exposed Tendon: no    Impression: worsening    Short Term Goals: INCREASE GRANULATION, DECREASE SIZE

## 2021-10-11 LAB
BACTERIA SPEC AEROBE CULT: ABNORMAL
BACTERIA SPEC AEROBE CULT: ABNORMAL
GRAM STN SPEC: ABNORMAL
GRAM STN SPEC: ABNORMAL

## 2021-10-15 DIAGNOSIS — E11.621 DIABETIC ULCER OF OTHER PART OF RIGHT FOOT ASSOCIATED WITH TYPE 2 DIABETES MELLITUS, LIMITED TO BREAKDOWN OF SKIN (HCC): Primary | ICD-10-CM

## 2021-10-15 DIAGNOSIS — L97.511 DIABETIC ULCER OF OTHER PART OF RIGHT FOOT ASSOCIATED WITH TYPE 2 DIABETES MELLITUS, LIMITED TO BREAKDOWN OF SKIN (HCC): Primary | ICD-10-CM

## 2021-10-15 RX ORDER — SULFAMETHOXAZOLE AND TRIMETHOPRIM 800; 160 MG/1; MG/1
1 TABLET ORAL 2 TIMES DAILY
Qty: 30 TABLET | Refills: 0 | Status: ON HOLD | OUTPATIENT
Start: 2021-10-15 | End: 2022-01-15

## 2021-10-15 NOTE — TELEPHONE ENCOUNTER
PER DR. DESOUZA PATIENT NEEDS TO BEGIN AN ANTIBIOTIC FOR HER POSITIVE WOUND CULTURE. CALLED PATIENT AND ADVISED HER OF WOUND CULTURE RESULTS AND THAT WE WILL BE SENDING IN AN ANTIBIOTIC TO HER PHARMACY.

## 2021-10-22 ENCOUNTER — OFFICE VISIT (OUTPATIENT)
Dept: WOUND CARE | Facility: HOSPITAL | Age: 56
End: 2021-10-22

## 2021-10-22 VITALS
TEMPERATURE: 97.1 F | SYSTOLIC BLOOD PRESSURE: 130 MMHG | HEART RATE: 78 BPM | DIASTOLIC BLOOD PRESSURE: 78 MMHG | OXYGEN SATURATION: 95 % | RESPIRATION RATE: 18 BRPM

## 2021-10-22 DIAGNOSIS — E11.621 DIABETIC ULCER OF OTHER PART OF RIGHT FOOT ASSOCIATED WITH TYPE 2 DIABETES MELLITUS, LIMITED TO BREAKDOWN OF SKIN (HCC): Primary | ICD-10-CM

## 2021-10-22 DIAGNOSIS — L97.511 DIABETIC ULCER OF OTHER PART OF RIGHT FOOT ASSOCIATED WITH TYPE 2 DIABETES MELLITUS, LIMITED TO BREAKDOWN OF SKIN (HCC): Primary | ICD-10-CM

## 2021-10-22 PROCEDURE — 97602 WOUND(S) CARE NON-SELECTIVE: CPT | Performed by: SURGERY

## 2021-10-22 PROCEDURE — 97597 DBRDMT OPN WND 1ST 20 CM/<: CPT | Performed by: SURGERY

## 2021-10-22 NOTE — PROGRESS NOTES
Chief Complaint  Wound Check (RIGHT FOOT ULCER, DM)    Subjective            Objective     Vitals:    10/22/21 1103   BP: 130/78   BP Location: Left arm   Patient Position: Sitting   Pulse: 78   Resp: 18   Temp: 97.1 °F (36.2 °C)   TempSrc: Temporal   PainSc: 4  Comment: OCCASSIONAL PAIN DURING THE DAY   PainLoc: Foot  Comment: RIGHT         I have reviewed the HPI and ROS as documented by MA/RN. Theo Rollins MD    Physical Exam     Wound Description:  The diabetic ulcer on the plantar surface of the patient's right foot continues to be smaller.  There is considerable amount of callus around the wound site which I excised today.  The wound itself as was the area of the callus was silver nitrated.  I have asked the patient to continue to apply Aquacel to the wound on a daily basis.    Result Review :   The following data was reviewed by: Theo Rollins MD on 10/22/2021:      Wound debridement  Performed by: Theo Rollins MD  Authorized by: Theo Rollins MD     Correct patient: Identification verified by two methods:     Date of birth  Correct procedure/consent    How many wounds are you performing a debridement on?:  1  Wound 1:     Provider Documentation    Debridement type:  Sharp/excisional    Other:  Alcohol    Other:  15.  Scalpel     None    Tissue debrided:  Moderate    Type tissue:  Slough    Level removed:  Skin    Patient tolerance:  Good    Hemostasis achieved by:  Silver nitrate    Wound Bed Post Debridement: See Photo      Description:  The total surface area of the wound with the callus tissue removed was 6.44 cm²            Assessment and Plan    Diagnoses and all orders for this visit:    1. Diabetic ulcer of other part of right foot associated with type 2 diabetes mellitus, limited to breakdown of skin (HCC) (Primary)    Other orders  -     Wound debridement            Follow Up   Return in about 2 weeks (around 11/5/2021).  Patient was given instructions and counseling regarding  her condition or for health maintenance advice. Please see specific information pulled into the AVS if appropriate.

## 2021-11-05 ENCOUNTER — OFFICE VISIT (OUTPATIENT)
Dept: WOUND CARE | Facility: HOSPITAL | Age: 56
End: 2021-11-05

## 2021-11-05 VITALS
SYSTOLIC BLOOD PRESSURE: 158 MMHG | TEMPERATURE: 96.8 F | DIASTOLIC BLOOD PRESSURE: 82 MMHG | RESPIRATION RATE: 18 BRPM | HEART RATE: 62 BPM | OXYGEN SATURATION: 96 %

## 2021-11-05 DIAGNOSIS — E11.621 DIABETIC ULCER OF OTHER PART OF RIGHT FOOT ASSOCIATED WITH TYPE 2 DIABETES MELLITUS, LIMITED TO BREAKDOWN OF SKIN (HCC): Primary | ICD-10-CM

## 2021-11-05 DIAGNOSIS — L97.511 DIABETIC ULCER OF OTHER PART OF RIGHT FOOT ASSOCIATED WITH TYPE 2 DIABETES MELLITUS, LIMITED TO BREAKDOWN OF SKIN (HCC): Primary | ICD-10-CM

## 2021-11-05 PROCEDURE — 97597 DBRDMT OPN WND 1ST 20 CM/<: CPT | Performed by: SURGERY

## 2021-11-05 PROCEDURE — 97602 WOUND(S) CARE NON-SELECTIVE: CPT | Performed by: SURGERY

## 2021-11-05 RX ORDER — PRASUGREL 10 MG/1
10 TABLET, FILM COATED ORAL DAILY
COMMUNITY
Start: 2021-09-29

## 2021-11-05 RX ORDER — MULTIVIT-MIN/IRON/FOLIC ACID/K 18-600-40
500 CAPSULE ORAL DAILY
COMMUNITY

## 2021-11-05 RX ORDER — LORATADINE 10 MG/1
10 TABLET ORAL DAILY
COMMUNITY
Start: 2021-10-05

## 2021-11-05 RX ORDER — PEN NEEDLE, DIABETIC 31 GX5/16"
NEEDLE, DISPOSABLE MISCELLANEOUS 4 TIMES DAILY
Status: ON HOLD | COMMUNITY
Start: 2021-10-01 | End: 2022-01-15

## 2021-11-05 RX ORDER — ISOSORBIDE MONONITRATE 60 MG/1
60 TABLET, EXTENDED RELEASE ORAL 2 TIMES DAILY
COMMUNITY
Start: 2021-10-01

## 2021-11-05 RX ORDER — PEN NEEDLE, DIABETIC 29 G X1/2"
NEEDLE, DISPOSABLE MISCELLANEOUS
Status: ON HOLD | COMMUNITY
Start: 2021-09-13 | End: 2022-01-15

## 2021-11-05 RX ORDER — SIMVASTATIN 20 MG
TABLET ORAL
COMMUNITY
Start: 2021-08-14 | End: 2022-01-07

## 2021-11-05 NOTE — PROGRESS NOTES
Chief Complaint  No chief complaint on file.    Subjective            Objective     Vitals:    11/05/21 1037   BP: 158/82   BP Location: Left arm   Patient Position: Sitting   Pulse: 62   Resp: 18   Temp: 96.8 °F (36 °C)   TempSrc: Temporal   SpO2: 96%   PainSc: 0-No pain         I have reviewed the HPI and ROS as documented by MA/RN. Theo Rollins MD    Physical Exam     Wound Description:  The diabetic ulcer on the patient's plantar surface of her right foot is continuing to heal  and is much smaller today.  The wound is quite superficial.  There is considerable callus around the wound site which I removed today I have asked the patient to continue to place Aquacel Ag on the wound site on a daily basis and I will see her again in 3 weeks.  Result Review :   The following data was reviewed by: Theo Rollins MD on 11/05/2021:      Wound debridement  Performed by: Theo Rollins MD  Authorized by: Theo Rollins MD     Correct patient: Identification verified by two methods:     Verbally and Date of birth  Correct procedure/consent    How many wounds are you performing a debridement on?:  1  Wound 1:     Provider Documentation    Debridement type:  Sharp/excisional    Other:  10.  Scalpel    Tissue debrided:  Moderate    Type tissue:  Slough    Level removed:  Skin    Patient tolerance:  Good    Hemostasis achieved by:  Silver nitrate    Wound Bed Post Debridement: See Photo      Description:  Total surface area of wound debrided was 3.38 cm²            Assessment and Plan    Diagnoses and all orders for this visit:    1. Diabetic ulcer of other part of right foot associated with type 2 diabetes mellitus, limited to breakdown of skin (HCC) (Primary)    Other orders  -     Wound debridement            Follow Up   Return in about 3 weeks (around 11/26/2021).  Patient was given instructions and counseling regarding her condition or for health maintenance advice. Please see specific information pulled  into the AVS if appropriate.

## 2021-11-29 ENCOUNTER — OFFICE VISIT (OUTPATIENT)
Dept: WOUND CARE | Facility: HOSPITAL | Age: 56
End: 2021-11-29

## 2021-11-29 VITALS
DIASTOLIC BLOOD PRESSURE: 78 MMHG | WEIGHT: 213 LBS | HEART RATE: 67 BPM | HEIGHT: 65 IN | TEMPERATURE: 97.1 F | SYSTOLIC BLOOD PRESSURE: 140 MMHG | BODY MASS INDEX: 35.49 KG/M2 | RESPIRATION RATE: 16 BRPM

## 2021-11-29 DIAGNOSIS — E11.42 DIABETIC POLYNEUROPATHY ASSOCIATED WITH TYPE 2 DIABETES MELLITUS (HCC): ICD-10-CM

## 2021-11-29 DIAGNOSIS — E11.621 DIABETIC ULCER OF RIGHT MIDFOOT ASSOCIATED WITH TYPE 2 DIABETES MELLITUS, WITH FAT LAYER EXPOSED (HCC): Primary | ICD-10-CM

## 2021-11-29 DIAGNOSIS — L97.412 DIABETIC ULCER OF RIGHT MIDFOOT ASSOCIATED WITH TYPE 2 DIABETES MELLITUS, WITH FAT LAYER EXPOSED (HCC): Primary | ICD-10-CM

## 2021-11-29 PROCEDURE — 11042 DBRDMT SUBQ TIS 1ST 20SQCM/<: CPT | Performed by: EMERGENCY MEDICINE

## 2021-11-29 PROCEDURE — 87205 SMEAR GRAM STAIN: CPT | Performed by: EMERGENCY MEDICINE

## 2021-11-29 PROCEDURE — 87070 CULTURE OTHR SPECIMN AEROBIC: CPT | Performed by: EMERGENCY MEDICINE

## 2021-11-29 NOTE — PROGRESS NOTES
Chief Complaint  Wound Check (RIGHT FOOT DM ULCER (BS: 78))    Subjective        History of Present Illness    Is a 56-year-old female who has been followed by the wound care clinic for a RLE diabetic foot ulcer.  She has been applying Aquacel Ag to this area.  Severity of the wound has somewhat waxed and waned. Her diabetes is reasonably well controlled with her most recent hemoglobin A1c in April being 6.5.  She also has a history of peripheral vascular disease and has been seen by Dr. Henson in the past.    Allergies:  Aminoglycosides, Neosporin [neomycin-bacitracin zn-polymyx], Polymyxin b, and Pramoxine      Current Outpatient Medications:   •  albuterol sulfate HFA (ProAir HFA) 108 (90 Base) MCG/ACT inhaler, ProAir HFA 90 mcg/actuation inhalation HFA aerosol inhaler inhale 1 puff (90 mcg) by inhalation route every 6 hours as needed   Active, Disp: , Rfl:   •  Ascorbic Acid (Vitamin C) 500 MG capsule, Daily., Disp: , Rfl:   •  Aspirin 81 MG capsule, Take 81 mg by mouth Daily., Disp: , Rfl:   •  B-D ULTRAFINE III SHORT PEN 31G X 8 MM misc, 4 (Four) Times a Day. as directed, Disp: , Rfl:   •  BD ULTRA-FINE PEN NEEDLES 29G X 12.7MM misc, , Disp: , Rfl:   •  clopidogrel (Plavix) 75 MG tablet, Take 75 mg by mouth Daily., Disp: , Rfl:   •  furosemide (LASIX) 40 MG tablet, Take 40 mg by mouth Daily., Disp: , Rfl:   •  gabapentin (NEURONTIN) 800 MG tablet, Take 800 mg by mouth 3 (Three) Times a Day., Disp: , Rfl:   •  HYDROcodone-acetaminophen (NORCO) 7.5-325 MG per tablet, Take 1 tablet by mouth Every 8 (Eight) Hours., Disp: , Rfl:   •  hydrOXYzine pamoate (Vistaril) 25 MG capsule, Take 25 mg by mouth 3 (Three) Times a Day As Needed for Anxiety., Disp: , Rfl:   •  insulin aspart (NovoLOG FlexPen) 100 UNIT/ML solution pen-injector sc pen, Novolog Flexpen U-100 Insulin aspart 100 unit/mL (3 mL) subcutaneous, Disp: , Rfl:   •  Insulin Glargine (BASAGLAR KWIKPEN) 100 UNIT/ML injection pen, Inject 56 Units under the skin  into the appropriate area as directed Every Night., Disp: , Rfl:   •  isosorbide mononitrate (IMDUR) 60 MG 24 hr tablet, Take 60 mg by mouth 2 (Two) Times a Day., Disp: , Rfl:   •  lisinopril (PRINIVIL,ZESTRIL) 10 MG tablet, Take 10 mg by mouth 2 (two) times a day., Disp: , Rfl:   •  loratadine (CLARITIN) 10 MG tablet, Take 10 mg by mouth Daily., Disp: , Rfl:   •  metFORMIN (GLUCOPHAGE) 1000 MG tablet, Take 1,000 mg by mouth 2 (two) times a day., Disp: , Rfl:   •  metoprolol succinate XL (TOPROL-XL) 50 MG 24 hr tablet, Take 50 mg by mouth Daily., Disp: , Rfl:   •  metoprolol tartrate (LOPRESSOR) 50 MG tablet, Take 50 mg by mouth Daily., Disp: , Rfl:   •  prasugrel (EFFIENT) 10 MG tablet, Take 10 mg by mouth Daily., Disp: , Rfl:   •  predniSONE (DELTASONE) 20 MG tablet, Take 10 mg by mouth., Disp: , Rfl:   •  simvastatin (ZOCOR) 20 MG tablet, TAKE 1 TABLET BY MOUTH ONCE DAILY AT BEDTIME FOR 90 DAYS, Disp: , Rfl:   •  simvastatin (ZOCOR) 40 MG tablet, Take 40 mg by mouth Every Night., Disp: , Rfl:   •  sulfamethoxazole-trimethoprim (Bactrim DS) 800-160 MG per tablet, Take 1 tablet by mouth 2 (Two) Times a Day., Disp: 30 tablet, Rfl: 0  •  Tresiba FlexTouch 200 UNIT/ML solution pen-injector pen injection, INJECT 66 UNITS SUBCUTANEOUSLY AT BEDTIME, Disp: , Rfl:     Past Medical History:   Diagnosis Date   • Anxiety     ANIXETY ATTACK AT WORK ABOUT A MONTH AGO   • Arthritis    • Condition not found     PSYCHIATRIC PROBLEMS- PT TOOK AN OVERDOSE OF MEDICATION FOR BLOOD SUGAR. TOOK 8 PILLS.   • Condition not found     HERBS USED- BC POWDER AS NEEDED   • Congestive heart failure (CHF) (HCC)    • COPD (chronic obstructive pulmonary disease) (HCC)    • Diabetes (HCC)    • Diabetic neuropathy (HCC)    • Health care home, active care coordination     [facility] (FAX: [phone])   • History of anesthesia reaction     HARD TIME WAKING UP SOMETIME   • History of hiatal hernia    • History of kidney stones    • Hyperlipidemia    •  "Hypertension    • Irregular heartbeat     PT IS SEEING  (RECENTLY DID A 24-HOUR HEART MONITOR).   • Peripheral artery disease (HCC)    • Stroke (HCC)     PIN STROKE FROMA MIGRAINE/ 20 YEARS AGO   • Wears glasses      Past Surgical History:   Procedure Laterality Date   • ABDOMINAL SURGERY      33 HERNIA REPAIR, GALLBLADER REMOVED   • BLADDER REPAIR     • CHOLECYSTECTOMY     • DILATATION AND CURETTAGE      X2   • HERNIA REPAIR     • HYSTERECTOMY  1992    2 CS   • TOE AMPUTATION Right 02/14/2017    RIGHT GREAT TOE   • TONSILLECTOMY       Social History     Socioeconomic History   • Marital status:    Tobacco Use   • Smoking status: Former Smoker   • Smokeless tobacco: Never Used   • Tobacco comment: QUIT 4 YEARS AGO/ RECENLTY STARTED AGAIN 3 CIGS A DAY    Substance and Sexual Activity   • Alcohol use: Not Currently   • Drug use: Not Currently     Family History   Problem Relation Age of Onset   • Diabetes Mother    • Anesthesia problems Mother         HARD TIME WAKING UP SOMETIMES          Objective     Vitals:    11/29/21 1029   BP: 140/78   BP Location: Left arm   Patient Position: Sitting   Pulse: 67   Resp: 16   Temp: 97.1 °F (36.2 °C)   TempSrc: Temporal   Weight: 96.6 kg (213 lb)   Height: 165.1 cm (65\")   PainSc:   2     Body mass index is 35.45 kg/m².    STEADI Fall Risk Assessment has not been completed.     Review of Systems     ROS:  No fevers, chills, sweats, or body aches. No shortness of breath.     I have reviewed the HPI and ROS as documented by MA/RN. Shamika Whitehead MD    Physical Exam     NAD, well dressed, well nourished  Normocephalic, atraumatic  EOMI, no scleral icterus  No respiratory distress, no cough  AAOx3, pleasant, cooperative              Result Review :  The following data was reviewed by: Shamika Whitehead MD on 11/29/2021:    Wound Avatar Documentation     Active Wound LDAs        Wound 06/29/21 1328 Right medial foot    Placement date 06/29/21   -MADHAVI 06/29/21 1328   "    Placement time 1328   - 06/29/21 1328 Days 152    Present on Hospital Admission: Y   - 06/29/21 1328 Side: Right   - 06/29/21 1328    Orientation: medial   - 06/29/21 1328 Location: foot   - 06/29/21 1328    Assessments       11/29/21 1041 11/05/21 1100 11/05/21 1045 10/22/21 1134 10/22/21 1100           Wound Image  View All Images View Images   - 11/29/21 1045    --  View Images   - 11/05/21 1046    --  View Images   - 10/22/21 1102      Dressing Appearance moist drainage; intact   - 11/29/21 1045 --  intact; moist drainage   - 11/05/21 1046 --  intact; moist drainage   - 10/22/21 1102   Closure --  --  --  --  --    Base --  --  --  --  --    Black (%), Wound Tissue Color 0   - 11/29/21 1045 --  --  --  --    Red (%), Wound Tissue Color 100   - 11/29/21 1045 --  25   - 11/05/21 1046 --  75   - 10/22/21 1102   Yellow (%), Wound Tissue Color 0   - 11/29/21 1045 --  75   - 11/05/21 1046 --  25   - 10/22/21 1102   Periwound macerated   - 11/29/21 1045 --  intact; blanchable   - 11/05/21 1046 --  intact   - 10/22/21 1102   Periwound Temperature warm   - 11/29/21 1045 --  warm   - 11/05/21 1046 --  warm   - 10/22/21 1102   Periwound Skin Turgor soft   - 11/29/21 1045 --  soft   - 11/05/21 1046 --  soft   - 10/22/21 1102   Edges open   - 11/29/21 1045 --  open; callused   - 11/05/21 1046 --  open   - 10/22/21 1102   Wound Length (cm) 2.7 cm   - 11/29/21 1045 2.6 cm  POST DEBRIDEMENT   - 11/05/21 1138 2.5 cm   - 11/05/21 1046 2.8 cm (P)   POST DEBRIDEMENT   - 10/22/21 1136 2.8 cm   - 10/22/21 1102   Wound Width (cm) 1.6 cm   - 11/29/21 1045 1.6 cm  POST DEBRIDEMENT   - 11/05/21 1138 1.4 cm   - 11/05/21 1046 2.3 cm (P)   POST DEBRIDEMENT   - 10/22/21 1136 1 cm   - 10/22/21 1102   Wound Depth (cm) 1 cm   - 11/29/21 1045 0.4 cm  POST DEBRIDEMENT   - 11/05/21 1138 0.6 cm   - 11/05/21 1046 0.6 cm (P)   POST DEBRIDEMENT   - 10/22/21 1136  0.8 cm   - 10/22/21 1102   Tunneling [Depth (cm)/Location] 0   - 11/29/21 1045 --  --  --  --    Undermining [Depth (cm)/Location] 1.1 from 1 to 5 O'CLOCK (1.8 at 3:00)   - 11/29/21 1045 --  --  --  --    Drainage Characteristics/Odor serous; serosanguineous; malodorous   - 11/29/21 1045 --  sanguineous; serous; malodorous   - 11/05/21 1046 --  serous; serosanguineous; malodorous   - 10/22/21 1102   Drainage Amount moderate   - 11/29/21 1045 --  moderate   - 11/05/21 1046 --  moderate   - 10/22/21 1102   Care, Wound cleansed with; sterile normal saline   - 11/29/21 1045 cleansed with; sterile normal saline   - 11/05/21 1138 cleansed with; sterile normal saline   - 11/05/21 1046 debrided; silver agent applied; cleansed with; sterile normal saline (P)   DEBRIDED & SILVER NITRATE APPLIED BY MD   - 10/22/21 1136 cleansed with; sterile normal saline   - 10/22/21 1102   Dressing Care --  dressing applied; hydrofiber; border dressing; other (see comments)  OPTICtakokat AG, GAUZE, MEPILEX   - 11/05/21 1138 --  dressing applied; border dressing; hydrofiber; silver impregnated (P)    - 10/22/21 1136 --    Periwound Care --  --  --  --  --    Adhesive Closure Strips --  --  --  --  --    Number of Adhesive Closure Strips --  --  --  --  --    Sutures Removed Intact --  --  --  --  --    Number of Sutures Removed --  --  --  --  --    Staples Removed Intact --  --  --  --  --    Number of Staples Removed --  --  --  --  --    Wound Output (mL) --  --  --  --  --          10/08/21 1429 10/08/21 1342 09/17/21 1043 09/17/21 1029 08/23/21 1155           Wound Image  View All Images View Images   - 10/08/21 1431    View Images   - 10/08/21 1343    View Images   - 09/17/21 1045    View Images   - 09/17/21 1030    View Images   - 08/23/21 1157      Dressing Appearance other (see comments)  POST DEBRIDEMENT   - 10/08/21 1431 moist drainage; intact   - 10/08/21 1343 other (see comments)  post  debridement   -MADHAVI 09/17/21 1045 moist drainage; intact   -MADHAVI 09/17/21 1030 other (see comments)  POST DEBRIDEMENT   -MADHAVI 08/23/21 1157   Closure --  --  --  --  --    Base --  --  --  --  --    Black (%), Wound Tissue Color 0   -MADHAVI 10/08/21 1431 --  0   -MADHAVI 09/17/21 1045 0   -MADHAVI 09/17/21 1030 0   -MADHAVI 08/23/21 1157   Red (%), Wound Tissue Color 100   -MADHAVI 10/08/21 1431 25   -BA 10/08/21 1343 100   -MADHAVI 09/17/21 1045 25   -MADHAVI 09/17/21 1030 100   -MADHAVI 08/23/21 1157   Yellow (%), Wound Tissue Color 0   -MADHAVI 10/08/21 1431 75   -BA 10/08/21 1343 0   -MADHAVI 09/17/21 1045 --  0   -MADHAVI 09/17/21 1030 0   -MADHAVI 08/23/21 1157   Periwound intact   -MADHAVI 10/08/21 1431 --  intact   -MADHAVI 09/17/21 1045 intact; dry   -MADHAVI 09/17/21 1030 intact   -MADHAVI 08/23/21 1157   Periwound Temperature warm   - 10/08/21 1431 warm   - 10/08/21 1343 warm   -MADHAVI 09/17/21 1045 warm   -MADHAVI 09/17/21 1030 warm   -MADHAVI 08/23/21 1157   Periwound Skin Turgor soft   -MADHAVI 10/08/21 1431 soft   -BA 10/08/21 1343 soft   -MADHAVI 09/17/21 1045 firm   -MADHAVI 09/17/21 1030 soft   -MADHAVI 08/23/21 1157   Edges open   -MADHAVI 10/08/21 1431 callused; open   -BA 10/08/21 1343 open   -MADHAVI 09/17/21 1045 callused; open   -MADHAVI 09/17/21 1030 open   -MADHAVI 08/23/21 1157   Wound Length (cm) 3.6 cm   -MADHAVI 10/08/21 1431 1.2 cm   -BA 10/08/21 1343 1.5 cm   -MADHAVI 09/17/21 1045 1.7 cm   -MADHAVI 09/17/21 1030 1.5 cm   -MADHAVI 08/23/21 1157   Wound Width (cm) 2.1 cm   -MADHAVI 10/08/21 1431 1 cm   -BA 10/08/21 1343 1 cm   -MADHAVI 09/17/21 1045 1 cm   -MADHAVI 09/17/21 1030 1.3 cm   -MADHAVI 08/23/21 1157   Wound Depth (cm) 0.3 cm   -MADHAVI 10/08/21 1431 1.8 cm   -BA 10/08/21 1343 0.2 cm   -MADHAVI 09/17/21 1045 0.4 cm   -MADHAVI 09/17/21 1030 0.2 cm   -MADHAVI 08/23/21 1157   Tunneling [Depth (cm)/Location] 0   -MADHAVI 10/08/21 1431 --  0   -MADHAVI 09/17/21 1045 0   -MADHAVI 09/17/21 1030 0   -MADHAVI 08/23/21 1157   Undermining [Depth (cm)/Location] 0   -MADHAVI 10/08/21 1431 1.0 AT 6 OCLOCK   -BA 10/08/21 1343 0   -MADHAVI 09/17/21 1045 0   -MADHAVI 09/17/21 1030 0   -MADHAVI 08/23/21 1157   Drainage  Characteristics/Odor bleeding controlled; other (see comments)  POST DEBRIDEMENT   - 10/08/21 1431 serous; purulent; malodorous   - 10/08/21 1343 bleeding controlled; other (see comments)  post debridement   - 09/17/21 1045 serous; serosanguineous   - 09/17/21 1030 sanguineous   - 08/23/21 1157   Drainage Amount --  moderate   - 10/08/21 1343 --  moderate   - 09/17/21 1030 small   - 08/23/21 1157   Care, Wound cleansed with; sterile normal saline   - 10/08/21 1431 cleansed with; irrigated with; sterile normal saline   - 10/08/21 1343 cleansed with; sterile normal saline   - 09/17/21 1045 cleansed with; sterile normal saline   - 09/17/21 1030 cleansed with; sterile normal saline   - 08/23/21 1157   Dressing Care dressing applied; other (see comments)  SILVER NITRATE(APPLIED BY MD), GENTAMICIN, MEPILEX   - 10/08/21 1431 --  dressing applied; other (see comments)  SILVER NITRATE APPLIED BY MD, AQUACEL AG, MEPILEX   - 09/17/21 1045 --  dressing applied; other (see comments)  AQUACEL AG, MEPILEX   - 08/23/21 1157   Periwound Care dry periwound area maintained   - 10/08/21 1431 --  dry periwound area maintained   - 09/17/21 1045 --  dry periwound area maintained   - 08/23/21 1157   Adhesive Closure Strips --  --  --  --  --    Number of Adhesive Closure Strips --  --  --  --  --    Sutures Removed Intact --  --  --  --  --    Number of Sutures Removed --  --  --  --  --    Staples Removed Intact --  --  --  --  --    Number of Staples Removed --  --  --  --  --    Wound Output (mL) --  --  --  --  --          08/23/21 1057 07/26/21 1139 07/26/21 1042 06/29/21 1340 06/29/21 1329           Wound Image  View All Images View Images   - 08/23/21 1059    View Images   -MADHAVI 07/26/21 1142    View Images   -MADHAVI 07/26/21 1049    View Images   -BA 06/29/21 1342    View Images   - 06/29/21 1331      Dressing Appearance moist drainage; intact   - 08/23/21 1059 moist drainage; intact   -MADHAVI  07/26/21 1142 moist drainage; intact   - 07/26/21 1049 --  moist drainage; intact   - 06/29/21 1331   Closure --  --  --  --  --    Base --  --  --  --  --    Black (%), Wound Tissue Color 0   - 08/23/21 1059 --  POST DEBRIDEMENT: SILVER NITRATE APPLIED BY MD   - 07/26/21 1142 0   - 07/26/21 1049 --  0   - 06/29/21 1331   Red (%), Wound Tissue Color 75   - 08/23/21 1059 --  POST DEBRIDEMENT: SILVER NITRATE APPLIED BY MD   - 07/26/21 1142 100   - 07/26/21 1049 100   - 06/29/21 1342 50   - 06/29/21 1331   Yellow (%), Wound Tissue Color 25   - 08/23/21 1059 --  POST DEBRIDEMENT: SILVER NITRATE APPLIED BY MD   - 07/26/21 1142 0   - 07/26/21 1049 --  0   - 06/29/21 1331   Periwound intact; dry   - 08/23/21 1059 intact   - 07/26/21 1142 dry; intact   - 07/26/21 1049 --  intact   - 06/29/21 1331   Periwound Temperature warm   - 08/23/21 1059 warm   - 07/26/21 1142 warm   - 07/26/21 1049 warm   - 06/29/21 1342 warm   - 06/29/21 1331   Periwound Skin Turgor firm   - 08/23/21 1059 soft   - 07/26/21 1142 firm   - 07/26/21 1049 firm   - 06/29/21 1342 firm   - 06/29/21 1331   Edges callused; open   - 08/23/21 1059 open   - 07/26/21 1142 open   - 07/26/21 1049 callused   - 06/29/21 1342 callused   - 06/29/21 1331   Wound Length (cm) 1.6 cm   - 08/23/21 1059 1.7 cm   - 07/26/21 1142 1.6 cm   - 07/26/21 1049 2 cm   -BA 06/29/21 1342 2 cm   -MADHAVI 06/29/21 1331   Wound Width (cm) 1.3 cm   -MADHAVI 08/23/21 1059 1.7 cm   -MADHAVI 07/26/21 1142 1.9 cm   -MADHAVI 07/26/21 1049 2 cm   -BA 06/29/21 1342 2 cm   -MADHAVI 06/29/21 1331   Wound Depth (cm) 0.2 cm   -MADHAVI 08/23/21 1059 0.2 cm   -MADHAVI 07/26/21 1142 0.3 cm   -MADHAVI 07/26/21 1049 0.2 cm   -BA 06/29/21 1342 0.4 cm   -MADHAVI 06/29/21 1331   Tunneling [Depth (cm)/Location] 0   -MADHAVI 08/23/21 1059 0   -MADHAVI 07/26/21 1142 0   -MADHAVI 07/26/21 1049 --  0   -MADHAVI 06/29/21 1331   Undermining [Depth (cm)/Location] 0   -MADHAVI 08/23/21 1059 0   -MADHAVI 07/26/21 1142 0    - 07/26/21 1049 --  0   - 06/29/21 1331   Drainage Characteristics/Odor serous; serosanguineous   - 08/23/21 1059 bleeding controlled   - 07/26/21 1142 serous; serosanguineous   - 07/26/21 1049 bleeding controlled   - 06/29/21 1342 serous   - 06/29/21 1331   Drainage Amount moderate   - 08/23/21 1059 none   - 07/26/21 1142 moderate   - 07/26/21 1049 --  moderate   - 06/29/21 1331   Care, Wound cleansed with; sterile normal saline   - 08/23/21 1059 cleansed with; sterile normal saline   - 07/26/21 1142 cleansed with; sterile normal saline   - 07/26/21 1049 cleansed with; sterile normal saline   - 06/29/21 1342 cleansed with; sterile normal saline   - 06/29/21 1331   Dressing Care --  dressing applied; other (see comments)  SILVER NITRATE APPLIED BY MD, [employer], GAUZE, OPTIFOAM   - 07/26/21 1142 --  dressing applied; hydrofiber; silver impregnated; gauze   - 06/29/21 1342 --    Periwound Care --  dry periwound area maintained   - 07/26/21 1142 --  --  --    Adhesive Closure Strips --  --  --  --  --    Number of Adhesive Closure Strips --  --  --  --  --    Sutures Removed Intact --  --  --  --  --    Number of Sutures Removed --  --  --  --  --    Staples Removed Intact --  --  --  --  --    Number of Staples Removed --  --  --  --  --    Wound Output (mL) --  --  --  --  --                    User Key  (r) = Recorded By, (t) = Taken By, (c) = Cosigned By    Initials Name Effective Dates Provider Type Discipline    Audra Henderson, RN 06/09/21 -  Registered Nurse Nurse    Zara Hemphill RN 06/09/21 -  Registered Nurse --                Wound debridement  Performed by: Shamika Whitehead MD  Authorized by: Shamika Whitehead MD     Correct patient: Identification verified by two methods:     Verbally and Date of birth  Correct procedure/consent    Correct site/side    Correct equipment as applicable    How many wounds are you performing a debridement on?:  1  Wound 1:      Nursing Documentation:     Wound Location:  Right foot  Measurements:     The total amount debrided on this wound was under 20 cm2.     Provider Documentation    Debridement type:  Sharp/excisional    Betadine      Other:  Sterile 10 blade scalpel     None    Tissue debrided:  Moderate    Type tissue:  Culture sent    Level removed:  Subcutaneous    Patient tolerance:  Good    Hemostasis achieved by:  Silver nitrate and Direct pressure    Wound Bed Post Debridement: See Photo                      Assessment and Plan   Diagnoses and all orders for this visit:    1. Diabetic ulcer of right midfoot associated with type 2 diabetes mellitus, with fat layer exposed (HCC) (Primary)    2. Diabetic polyneuropathy associated with type 2 diabetes mellitus (HCC)    Other orders  -     Wound debridement      Switch to PolyMem silver every other day.  Patient complains of an odor so culture sent today.  No erythema or significant swelling.  I have spoken with her at length regarding the importance of offloading this area and staying off of her right foot to give this a chance to heal.  Return in 3 weeks for recheck.    Patient was given instructions and counseling regarding their condition or for health maintenance advice, as well as the wound care plan and recommendations. They understand and agree with the plan.  They will follow back up here in the clinic but are instructed to contact us in the interim should they have any new, returning, or worsening symptoms or concerns. Please see specific information pulled into the AVS if appropriate.       Follow Up   Return in about 4 weeks (around 12/27/2021).      Shamika Whitehead MD

## 2021-12-02 LAB
BACTERIA SPEC AEROBE CULT: NORMAL
GRAM STN SPEC: NORMAL

## 2021-12-03 ENCOUNTER — TRANSCRIBE ORDERS (OUTPATIENT)
Dept: ADMINISTRATIVE | Facility: HOSPITAL | Age: 56
End: 2021-12-03

## 2021-12-03 DIAGNOSIS — Z12.31 ENCOUNTER FOR SCREENING MAMMOGRAM FOR MALIGNANT NEOPLASM OF BREAST: Primary | ICD-10-CM

## 2022-01-06 ENCOUNTER — OFFICE VISIT (OUTPATIENT)
Dept: WOUND CARE | Facility: HOSPITAL | Age: 57
End: 2022-01-06

## 2022-01-06 VITALS
TEMPERATURE: 97.2 F | SYSTOLIC BLOOD PRESSURE: 162 MMHG | HEIGHT: 65 IN | BODY MASS INDEX: 35.49 KG/M2 | WEIGHT: 213 LBS | HEART RATE: 69 BPM | RESPIRATION RATE: 16 BRPM | DIASTOLIC BLOOD PRESSURE: 82 MMHG

## 2022-01-06 DIAGNOSIS — L97.511 DIABETIC ULCER OF OTHER PART OF RIGHT FOOT ASSOCIATED WITH TYPE 2 DIABETES MELLITUS, LIMITED TO BREAKDOWN OF SKIN: Primary | ICD-10-CM

## 2022-01-06 DIAGNOSIS — E11.621 DIABETIC ULCER OF OTHER PART OF RIGHT FOOT ASSOCIATED WITH TYPE 2 DIABETES MELLITUS, LIMITED TO BREAKDOWN OF SKIN: Primary | ICD-10-CM

## 2022-01-06 PROCEDURE — 97602 WOUND(S) CARE NON-SELECTIVE: CPT | Performed by: SURGERY

## 2022-01-06 PROCEDURE — 97597 DBRDMT OPN WND 1ST 20 CM/<: CPT | Performed by: SURGERY

## 2022-01-06 NOTE — PROGRESS NOTES
"Chief Complaint  Wound Check (DM right foot ulcer (BS: 86))    Subjective            Objective     Vitals:    01/06/22 0829   BP: 162/82   BP Location: Left arm   Patient Position: Sitting   Pulse: 69   Resp: 16   Temp: 97.2 °F (36.2 °C)   TempSrc: Temporal   Weight: 96.6 kg (213 lb)   Height: 165.1 cm (65\")   PainSc: 0-No pain         I have reviewed the HPI and ROS as documented by MA/RN. Theo Rollins MD    Physical Exam     Wound Description:  The diabetic ulcer on the plantar surface of the right foot is quite superficial at the present time.  I did debride the callus around the wound site.  The patient has been asked to use PolyMem to the wound site but she has been unable to acquire that and so she continues to use Aquacel I think the Aquacel is doing a good job so we will continue that until I see her again in 3 weeks.    Result Review :   The following data was reviewed by: Theo Rollins MD on 01/06/2022:      Wound debridement  Performed by: Theo Rollins MD  Authorized by: Theo Rollins MD     Correct patient: Identification verified by two methods:     Verbally and Date of birth  Correct procedure/consent    How many wounds are you performing a debridement on?:  1  Wound 1:     Provider Documentation    Debridement type:  Sharp/excisional    Other:  Alcohol    Other:  15.  Scalpel     None    Tissue debrided:  Moderate    Type tissue:  Slough    Level removed:  Skin    Patient tolerance:  Good    Hemostasis achieved by:  Silver nitrate    Wound Bed Post Debridement: See Photo      Description:  The total surface area the wound on the plantar surface of the right foot which was debrided was 6.3 cm²            Assessment and Plan    Diagnoses and all orders for this visit:    1. Diabetic ulcer of other part of right foot associated with type 2 diabetes mellitus, limited to breakdown of skin (HCC) (Primary)    Other orders  -     Wound debridement            Follow Up   Return in about 3 " weeks (around 1/27/2022).  Patient was given instructions and counseling regarding her condition or for health maintenance advice. Please see specific information pulled into the AVS if appropriate.

## 2022-01-08 PROCEDURE — U0004 COV-19 TEST NON-CDC HGH THRU: HCPCS | Performed by: FAMILY MEDICINE

## 2022-01-10 ENCOUNTER — TELEPHONE (OUTPATIENT)
Dept: URGENT CARE | Facility: CLINIC | Age: 57
End: 2022-01-10

## 2022-01-10 DIAGNOSIS — U07.1 COVID-19: Primary | ICD-10-CM

## 2022-01-10 NOTE — TELEPHONE ENCOUNTER
Spoke with the patient via telephone and verified the patient's name, date of birth, street address.  Notify the patient that they are positive for COVID.  Discussed quarantine, symptomatic management, ER precautions with the patient.  All questions answered and patient verbalized understanding of information.

## 2022-01-14 ENCOUNTER — APPOINTMENT (OUTPATIENT)
Dept: GENERAL RADIOLOGY | Facility: HOSPITAL | Age: 57
End: 2022-01-14

## 2022-01-14 ENCOUNTER — HOSPITAL ENCOUNTER (INPATIENT)
Facility: HOSPITAL | Age: 57
LOS: 5 days | Discharge: HOME HEALTH | End: 2022-01-19
Attending: EMERGENCY MEDICINE | Admitting: INTERNAL MEDICINE

## 2022-01-14 ENCOUNTER — APPOINTMENT (OUTPATIENT)
Dept: CT IMAGING | Facility: HOSPITAL | Age: 57
End: 2022-01-14

## 2022-01-14 DIAGNOSIS — R26.2 DIFFICULTY WALKING: ICD-10-CM

## 2022-01-14 DIAGNOSIS — U07.1 COVID-19: ICD-10-CM

## 2022-01-14 DIAGNOSIS — R09.02 HYPOXIA: Primary | ICD-10-CM

## 2022-01-14 DIAGNOSIS — J18.9 MULTIFOCAL PNEUMONIA: ICD-10-CM

## 2022-01-14 PROBLEM — J44.1 COPD EXACERBATION: Status: ACTIVE | Noted: 2022-01-14

## 2022-01-14 PROBLEM — J96.01 ACUTE HYPOXEMIC RESPIRATORY FAILURE DUE TO COVID-19 (HCC): Status: ACTIVE | Noted: 2022-01-14

## 2022-01-14 LAB
ALBUMIN SERPL-MCNC: 4 G/DL (ref 3.5–5.2)
ALBUMIN/GLOB SERPL: 1.2 G/DL
ALP SERPL-CCNC: 65 U/L (ref 39–117)
ALT SERPL W P-5'-P-CCNC: 12 U/L (ref 1–33)
ANION GAP SERPL CALCULATED.3IONS-SCNC: 12.8 MMOL/L (ref 5–15)
AST SERPL-CCNC: 17 U/L (ref 1–32)
BACTERIA UR QL AUTO: ABNORMAL /HPF
BASOPHILS # BLD AUTO: 0.03 10*3/MM3 (ref 0–0.2)
BASOPHILS NFR BLD AUTO: 0.7 % (ref 0–1.5)
BILIRUB SERPL-MCNC: 0.6 MG/DL (ref 0–1.2)
BILIRUB UR QL STRIP: NEGATIVE
BUN SERPL-MCNC: 17 MG/DL (ref 6–20)
BUN/CREAT SERPL: 23 (ref 7–25)
CALCIUM SPEC-SCNC: 8.7 MG/DL (ref 8.6–10.5)
CHLORIDE SERPL-SCNC: 102 MMOL/L (ref 98–107)
CLARITY UR: ABNORMAL
CO2 SERPL-SCNC: 23.2 MMOL/L (ref 22–29)
COLOR UR: ABNORMAL
CREAT SERPL-MCNC: 0.74 MG/DL (ref 0.57–1)
DEPRECATED RDW RBC AUTO: 54.4 FL (ref 37–54)
EOSINOPHIL # BLD AUTO: 0.02 10*3/MM3 (ref 0–0.4)
EOSINOPHIL NFR BLD AUTO: 0.5 % (ref 0.3–6.2)
ERYTHROCYTE [DISTWIDTH] IN BLOOD BY AUTOMATED COUNT: 15.8 % (ref 12.3–15.4)
GFR SERPL CREATININE-BSD FRML MDRD: 81 ML/MIN/1.73
GLOBULIN UR ELPH-MCNC: 3.4 GM/DL
GLUCOSE SERPL-MCNC: 146 MG/DL (ref 65–99)
GLUCOSE UR STRIP-MCNC: NEGATIVE MG/DL
HCT VFR BLD AUTO: 38.6 % (ref 34–46.6)
HGB BLD-MCNC: 12.7 G/DL (ref 12–15.9)
HGB UR QL STRIP.AUTO: ABNORMAL
HOLD SPECIMEN: NORMAL
HOLD SPECIMEN: NORMAL
HYALINE CASTS UR QL AUTO: ABNORMAL /LPF
IMM GRANULOCYTES # BLD AUTO: 0.02 10*3/MM3 (ref 0–0.05)
IMM GRANULOCYTES NFR BLD AUTO: 0.5 % (ref 0–0.5)
KETONES UR QL STRIP: NEGATIVE
LEUKOCYTE ESTERASE UR QL STRIP.AUTO: ABNORMAL
LYMPHOCYTES # BLD AUTO: 1.1 10*3/MM3 (ref 0.7–3.1)
LYMPHOCYTES NFR BLD AUTO: 27.1 % (ref 19.6–45.3)
MCH RBC QN AUTO: 30.8 PG (ref 26.6–33)
MCHC RBC AUTO-ENTMCNC: 32.9 G/DL (ref 31.5–35.7)
MCV RBC AUTO: 93.7 FL (ref 79–97)
MONOCYTES # BLD AUTO: 0.56 10*3/MM3 (ref 0.1–0.9)
MONOCYTES NFR BLD AUTO: 13.8 % (ref 5–12)
NEUTROPHILS NFR BLD AUTO: 2.33 10*3/MM3 (ref 1.7–7)
NEUTROPHILS NFR BLD AUTO: 57.4 % (ref 42.7–76)
NITRITE UR QL STRIP: NEGATIVE
NRBC BLD AUTO-RTO: 0 /100 WBC (ref 0–0.2)
NT-PROBNP SERPL-MCNC: 1259 PG/ML (ref 0–900)
PH UR STRIP.AUTO: 6 [PH] (ref 5–8)
PLATELET # BLD AUTO: 168 10*3/MM3 (ref 140–450)
PMV BLD AUTO: 10.9 FL (ref 6–12)
POTASSIUM SERPL-SCNC: 4.2 MMOL/L (ref 3.5–5.2)
PROT SERPL-MCNC: 7.4 G/DL (ref 6–8.5)
PROT UR QL STRIP: ABNORMAL
RBC # BLD AUTO: 4.12 10*6/MM3 (ref 3.77–5.28)
RBC # UR STRIP: ABNORMAL /HPF
REF LAB TEST METHOD: ABNORMAL
SODIUM SERPL-SCNC: 138 MMOL/L (ref 136–145)
SP GR UR STRIP: 1.02 (ref 1–1.03)
SQUAMOUS #/AREA URNS HPF: ABNORMAL /HPF
TROPONIN T SERPL-MCNC: <0.01 NG/ML (ref 0–0.03)
UROBILINOGEN UR QL STRIP: ABNORMAL
WBC # UR STRIP: ABNORMAL /HPF
WBC NRBC COR # BLD: 4.06 10*3/MM3 (ref 3.4–10.8)
WHOLE BLOOD HOLD SPECIMEN: NORMAL
WHOLE BLOOD HOLD SPECIMEN: NORMAL

## 2022-01-14 PROCEDURE — 87899 AGENT NOS ASSAY W/OPTIC: CPT | Performed by: INTERNAL MEDICINE

## 2022-01-14 PROCEDURE — 93005 ELECTROCARDIOGRAM TRACING: CPT | Performed by: EMERGENCY MEDICINE

## 2022-01-14 PROCEDURE — 99285 EMERGENCY DEPT VISIT HI MDM: CPT

## 2022-01-14 PROCEDURE — 93005 ELECTROCARDIOGRAM TRACING: CPT

## 2022-01-14 PROCEDURE — 87086 URINE CULTURE/COLONY COUNT: CPT | Performed by: NURSE PRACTITIONER

## 2022-01-14 PROCEDURE — 94640 AIRWAY INHALATION TREATMENT: CPT

## 2022-01-14 PROCEDURE — 84484 ASSAY OF TROPONIN QUANT: CPT

## 2022-01-14 PROCEDURE — 25010000002 DEXAMETHASONE PER 1 MG: Performed by: NURSE PRACTITIONER

## 2022-01-14 PROCEDURE — 94799 UNLISTED PULMONARY SVC/PX: CPT

## 2022-01-14 PROCEDURE — 71250 CT THORAX DX C-: CPT

## 2022-01-14 PROCEDURE — 80053 COMPREHEN METABOLIC PANEL: CPT

## 2022-01-14 PROCEDURE — 81001 URINALYSIS AUTO W/SCOPE: CPT | Performed by: NURSE PRACTITIONER

## 2022-01-14 PROCEDURE — 71045 X-RAY EXAM CHEST 1 VIEW: CPT

## 2022-01-14 PROCEDURE — 83880 ASSAY OF NATRIURETIC PEPTIDE: CPT

## 2022-01-14 PROCEDURE — 93010 ELECTROCARDIOGRAM REPORT: CPT | Performed by: INTERNAL MEDICINE

## 2022-01-14 PROCEDURE — 85025 COMPLETE CBC W/AUTO DIFF WBC: CPT

## 2022-01-14 RX ORDER — DEXTROSE MONOHYDRATE 100 MG/ML
25 INJECTION, SOLUTION INTRAVENOUS
Status: DISCONTINUED | OUTPATIENT
Start: 2022-01-14 | End: 2022-01-19 | Stop reason: HOSPADM

## 2022-01-14 RX ORDER — DEXAMETHASONE SODIUM PHOSPHATE 10 MG/ML
6 INJECTION INTRAMUSCULAR; INTRAVENOUS DAILY
Status: DISCONTINUED | OUTPATIENT
Start: 2022-01-15 | End: 2022-01-19 | Stop reason: HOSPADM

## 2022-01-14 RX ORDER — ARFORMOTEROL TARTRATE 15 UG/2ML
15 SOLUTION RESPIRATORY (INHALATION)
Status: DISCONTINUED | OUTPATIENT
Start: 2022-01-14 | End: 2022-01-19 | Stop reason: HOSPADM

## 2022-01-14 RX ORDER — BUDESONIDE 0.5 MG/2ML
0.5 INHALANT ORAL
Status: DISCONTINUED | OUTPATIENT
Start: 2022-01-14 | End: 2022-01-19 | Stop reason: HOSPADM

## 2022-01-14 RX ORDER — ATORVASTATIN CALCIUM 20 MG/1
20 TABLET, FILM COATED ORAL DAILY
Status: DISCONTINUED | OUTPATIENT
Start: 2022-01-15 | End: 2022-01-15

## 2022-01-14 RX ORDER — GUAIFENESIN 600 MG/1
1200 TABLET, EXTENDED RELEASE ORAL EVERY 12 HOURS SCHEDULED
Status: DISCONTINUED | OUTPATIENT
Start: 2022-01-14 | End: 2022-01-19 | Stop reason: HOSPADM

## 2022-01-14 RX ORDER — ASPIRIN 81 MG/1
81 TABLET, CHEWABLE ORAL DAILY
Status: DISCONTINUED | OUTPATIENT
Start: 2022-01-15 | End: 2022-01-19 | Stop reason: HOSPADM

## 2022-01-14 RX ORDER — HYDROCODONE BITARTRATE AND ACETAMINOPHEN 7.5; 325 MG/1; MG/1
1 TABLET ORAL ONCE
Status: COMPLETED | OUTPATIENT
Start: 2022-01-14 | End: 2022-01-14

## 2022-01-14 RX ORDER — FUROSEMIDE 10 MG/ML
40 INJECTION INTRAMUSCULAR; INTRAVENOUS 2 TIMES DAILY
Status: DISCONTINUED | OUTPATIENT
Start: 2022-01-14 | End: 2022-01-19 | Stop reason: HOSPADM

## 2022-01-14 RX ORDER — NICOTINE POLACRILEX 4 MG
15 LOZENGE BUCCAL
Status: DISCONTINUED | OUTPATIENT
Start: 2022-01-14 | End: 2022-01-19 | Stop reason: HOSPADM

## 2022-01-14 RX ORDER — DEXAMETHASONE SODIUM PHOSPHATE 10 MG/ML
10 INJECTION INTRAMUSCULAR; INTRAVENOUS ONCE
Status: COMPLETED | OUTPATIENT
Start: 2022-01-14 | End: 2022-01-14

## 2022-01-14 RX ORDER — IPRATROPIUM BROMIDE AND ALBUTEROL SULFATE 2.5; .5 MG/3ML; MG/3ML
3 SOLUTION RESPIRATORY (INHALATION) EVERY 4 HOURS PRN
Status: DISCONTINUED | OUTPATIENT
Start: 2022-01-14 | End: 2022-01-19 | Stop reason: HOSPADM

## 2022-01-14 RX ORDER — SODIUM CHLORIDE 0.9 % (FLUSH) 0.9 %
10 SYRINGE (ML) INJECTION AS NEEDED
Status: DISCONTINUED | OUTPATIENT
Start: 2022-01-14 | End: 2022-01-14

## 2022-01-14 RX ORDER — METOPROLOL SUCCINATE 50 MG/1
50 TABLET, EXTENDED RELEASE ORAL DAILY
Status: DISCONTINUED | OUTPATIENT
Start: 2022-01-15 | End: 2022-01-19 | Stop reason: HOSPADM

## 2022-01-14 RX ORDER — IPRATROPIUM BROMIDE AND ALBUTEROL SULFATE 2.5; .5 MG/3ML; MG/3ML
3 SOLUTION RESPIRATORY (INHALATION) ONCE
Status: COMPLETED | OUTPATIENT
Start: 2022-01-14 | End: 2022-01-14

## 2022-01-14 RX ORDER — PRASUGREL 10 MG/1
10 TABLET, FILM COATED ORAL DAILY
Status: DISCONTINUED | OUTPATIENT
Start: 2022-01-15 | End: 2022-01-19 | Stop reason: HOSPADM

## 2022-01-14 RX ORDER — HYDROXYZINE PAMOATE 25 MG/1
25 CAPSULE ORAL 3 TIMES DAILY PRN
Status: DISCONTINUED | OUTPATIENT
Start: 2022-01-14 | End: 2022-01-19 | Stop reason: HOSPADM

## 2022-01-14 RX ORDER — HYDROCODONE BITARTRATE AND ACETAMINOPHEN 7.5; 325 MG/1; MG/1
1 TABLET ORAL EVERY 8 HOURS
Status: DISCONTINUED | OUTPATIENT
Start: 2022-01-14 | End: 2022-01-15

## 2022-01-14 RX ORDER — AZITHROMYCIN 250 MG/1
500 TABLET, FILM COATED ORAL EVERY 24 HOURS
Status: COMPLETED | OUTPATIENT
Start: 2022-01-14 | End: 2022-01-18

## 2022-01-14 RX ORDER — BENZONATATE 100 MG/1
200 CAPSULE ORAL 3 TIMES DAILY PRN
Status: DISCONTINUED | OUTPATIENT
Start: 2022-01-14 | End: 2022-01-19 | Stop reason: HOSPADM

## 2022-01-14 RX ORDER — IPRATROPIUM BROMIDE AND ALBUTEROL SULFATE 2.5; .5 MG/3ML; MG/3ML
3 SOLUTION RESPIRATORY (INHALATION)
Status: DISCONTINUED | OUTPATIENT
Start: 2022-01-14 | End: 2022-01-16

## 2022-01-14 RX ORDER — FAMOTIDINE 20 MG/1
20 TABLET, FILM COATED ORAL
Status: DISCONTINUED | OUTPATIENT
Start: 2022-01-15 | End: 2022-01-19 | Stop reason: HOSPADM

## 2022-01-14 RX ORDER — GABAPENTIN 400 MG/1
800 CAPSULE ORAL 3 TIMES DAILY
Status: DISCONTINUED | OUTPATIENT
Start: 2022-01-14 | End: 2022-01-19 | Stop reason: HOSPADM

## 2022-01-14 RX ADMIN — IPRATROPIUM BROMIDE AND ALBUTEROL SULFATE 3 ML: .5; 3 SOLUTION RESPIRATORY (INHALATION) at 19:07

## 2022-01-14 RX ADMIN — GABAPENTIN 800 MG: 400 CAPSULE ORAL at 20:09

## 2022-01-14 RX ADMIN — HYDROCODONE BITARTRATE AND ACETAMINOPHEN 1 TABLET: 7.5; 325 TABLET ORAL at 19:40

## 2022-01-14 RX ADMIN — DEXAMETHASONE SODIUM PHOSPHATE 10 MG: 10 INJECTION INTRAMUSCULAR; INTRAVENOUS at 19:40

## 2022-01-14 NOTE — ED PROVIDER NOTES
Villa Del Real is a 56-year-old female who presents emergency department today for complaints of shortness of air and cough and positive COVID diagnosis.  She reports she has a patient of Dr. Muir and spoke with him earlier today and he told her to come in.  She has history of CHF.  She reports shortness of air more than usual and that home health came to her house today and checked her pulse ox and she was 84% on room air.          Review of Systems   Respiratory: Positive for cough and shortness of breath.    All other systems reviewed and are negative.      Past Medical History:   Diagnosis Date   • Anxiety     ANIXETY ATTACK AT WORK ABOUT A MONTH AGO   • Arthritis    • Condition not found     PSYCHIATRIC PROBLEMS- PT TOOK AN OVERDOSE OF MEDICATION FOR BLOOD SUGAR. TOOK 8 PILLS.   • Condition not found     HERBS USED- BC POWDER AS NEEDED   • Congestive heart failure (CHF) ([facility])    • COPD (chronic obstructive pulmonary disease) ([facility])    • Diabetes ([facility])    • Diabetic neuropathy ([facility])    • Health care home, active care coordination     [facility] (FAX: [phone])   • History of anesthesia reaction     HARD TIME WAKING UP SOMETIME   • History of hiatal hernia    • History of kidney stones    • Hyperlipidemia    • Hypertension    • Irregular heartbeat     PT IS SEEING  (RECENTLY DID A 24-HOUR HEART MONITOR).   • Peripheral artery disease ([facility])    • Stroke ([facility])     PIN STROKE FROMA MIGRAINE/ 20 YEARS AGO   • Wears glasses        Allergies   Allergen Reactions   • Aminoglycosides Unknown - Low Severity   • Neosporin [Neomycin-Bacitracin Zn-Polymyx] Itching and Rash   • Polymyxin B Unknown - Low Severity   • Pramoxine Unknown - Low Severity       Past Surgical History:   Procedure Laterality Date   • ABDOMINAL SURGERY      33 HERNIA REPAIR, GALLBLADER REMOVED   • BLADDER REPAIR     • CHOLECYSTECTOMY     • DILATATION AND CURETTAGE      X2   • HERNIA REPAIR     • HYSTERECTOMY  1992    2 CS   • TOE  AMPUTATION Right 02/14/2017    RIGHT GREAT TOE   • TONSILLECTOMY         Family History   Problem Relation Age of Onset   • Diabetes Mother    • Anesthesia problems Mother         HARD TIME WAKING UP SOMETIMES        Social History     Socioeconomic History   • Marital status:    Tobacco Use   • Smoking status: Former Smoker   • Smokeless tobacco: Never Used   • Tobacco comment: QUIT 4 YEARS AGO/ RECENLTY STARTED AGAIN 3 CIGS A DAY    Substance and Sexual Activity   • Alcohol use: Not Currently   • Drug use: Not Currently           Objective   Physical Exam  Vitals and nursing note reviewed.   Constitutional:       Appearance: She is well-developed. She is not ill-appearing or toxic-appearing.   Cardiovascular:      Rate and Rhythm: Normal rate and regular rhythm.   Pulmonary:      Effort: Pulmonary effort is normal.      Breath sounds: Examination of the right-upper field reveals wheezing. Examination of the left-upper field reveals wheezing. Examination of the right-middle field reveals wheezing. Examination of the left-middle field reveals wheezing. Examination of the right-lower field reveals wheezing. Examination of the left-lower field reveals wheezing. Wheezing present.   Abdominal:      General: Bowel sounds are normal.      Palpations: Abdomen is soft.   Musculoskeletal:         General: Normal range of motion.   Skin:     General: Skin is warm and dry.      Capillary Refill: Capillary refill takes less than 2 seconds.   Neurological:      Mental Status: She is alert.   Psychiatric:         Mood and Affect: Mood normal.         Behavior: Behavior normal.         Procedures           ED Course                                                 MDM  Number of Diagnoses or Management Options  COVID-19  Hypoxia  Multifocal pneumonia  Diagnosis management comments: Seen and assessed patient as noted.  Vital stable, no acute distress, afebrile.      Differentials include but are not limited to:  CHF  exacerbation, fluid overload, MI, PNA, covid-19, other, neurology etiology, other cardiac pulmonology.    Full work-up shows no emergent findings other than COVID-pneumonia.  WBC within normal limits so I do not suspect bacterial infection at this point, symptoms x 4 days.  However, patient has dropped down to 90% on room air here just resting and is having to set tripod.  CT shows no signs of pleural effusions or pulmonary edema or fluid overload.  Calling Dr. Muir about possible obs admission for dyspnea.    Dr Neumann accepted admission.       Amount and/or Complexity of Data Reviewed  Clinical lab tests: reviewed and ordered  Tests in the radiology section of CPT®: reviewed and ordered  Tests in the medicine section of CPT®: reviewed  Discuss the patient with other providers: yes    Risk of Complications, Morbidity, and/or Mortality  Presenting problems: moderate  Diagnostic procedures: moderate  Management options: moderate    Patient Progress  Patient progress: stable      Final diagnoses:   Hypoxia   Multifocal pneumonia   COVID-19       ED Disposition  ED Disposition     ED Disposition Condition Comment    Decision to Admit            No follow-up provider specified.       Medication List      No changes were made to your prescriptions during this visit.          Funmilayo Latham, APRN  01/14/22 2013

## 2022-01-15 PROBLEM — IMO0002 UNCONTROLLED DIABETES MELLITUS: Chronic | Status: ACTIVE | Noted: 2022-01-15

## 2022-01-15 PROBLEM — J18.9 MULTIFOCAL PNEUMONIA: Status: ACTIVE | Noted: 2022-01-15

## 2022-01-15 LAB
ALBUMIN SERPL-MCNC: 3.8 G/DL (ref 3.5–5.2)
ALBUMIN/GLOB SERPL: 1 G/DL
ALP SERPL-CCNC: 71 U/L (ref 39–117)
ALT SERPL W P-5'-P-CCNC: 11 U/L (ref 1–33)
ANION GAP SERPL CALCULATED.3IONS-SCNC: 15.3 MMOL/L (ref 5–15)
AST SERPL-CCNC: 15 U/L (ref 1–32)
B PARAPERT DNA SPEC QL NAA+PROBE: NOT DETECTED
B PERT DNA SPEC QL NAA+PROBE: NOT DETECTED
BACTERIA SPEC AEROBE CULT: NORMAL
BASOPHILS # BLD AUTO: 0.01 10*3/MM3 (ref 0–0.2)
BASOPHILS NFR BLD AUTO: 0.3 % (ref 0–1.5)
BILIRUB CONJ SERPL-MCNC: 0.2 MG/DL (ref 0–0.3)
BILIRUB SERPL-MCNC: 0.5 MG/DL (ref 0–1.2)
BUN SERPL-MCNC: 22 MG/DL (ref 6–20)
BUN/CREAT SERPL: 26.5 (ref 7–25)
C PNEUM DNA NPH QL NAA+NON-PROBE: NOT DETECTED
CALCIUM SPEC-SCNC: 8.6 MG/DL (ref 8.6–10.5)
CHLORIDE SERPL-SCNC: 97 MMOL/L (ref 98–107)
CO2 SERPL-SCNC: 19.7 MMOL/L (ref 22–29)
CREAT SERPL-MCNC: 0.83 MG/DL (ref 0.57–1)
CRP SERPL-MCNC: 5.15 MG/DL (ref 0–0.5)
D DIMER PPP FEU-MCNC: 0.85 MG/L (FEU) (ref 0–0.59)
D-LACTATE SERPL-SCNC: 3 MMOL/L (ref 0.5–2)
D-LACTATE SERPL-SCNC: 3.5 MMOL/L (ref 0.5–2)
DEPRECATED RDW RBC AUTO: 53.5 FL (ref 37–54)
EOSINOPHIL # BLD AUTO: 0 10*3/MM3 (ref 0–0.4)
EOSINOPHIL NFR BLD AUTO: 0 % (ref 0.3–6.2)
ERYTHROCYTE [DISTWIDTH] IN BLOOD BY AUTOMATED COUNT: 15.5 % (ref 12.3–15.4)
FERRITIN SERPL-MCNC: 447.5 NG/ML (ref 13–150)
FLUAV AG NPH QL: NEGATIVE
FLUAV SUBTYP SPEC NAA+PROBE: NOT DETECTED
FLUBV AG NPH QL IA: NEGATIVE
FLUBV RNA ISLT QL NAA+PROBE: NOT DETECTED
GFR SERPL CREATININE-BSD FRML MDRD: 71 ML/MIN/1.73
GLOBULIN UR ELPH-MCNC: 3.7 GM/DL
GLUCOSE BLDC GLUCOMTR-MCNC: 194 MG/DL (ref 70–99)
GLUCOSE BLDC GLUCOMTR-MCNC: 307 MG/DL (ref 70–99)
GLUCOSE BLDC GLUCOMTR-MCNC: 326 MG/DL (ref 70–99)
GLUCOSE BLDC GLUCOMTR-MCNC: 383 MG/DL (ref 70–99)
GLUCOSE BLDC GLUCOMTR-MCNC: 443 MG/DL (ref 70–99)
GLUCOSE SERPL-MCNC: 474 MG/DL (ref 65–99)
HADV DNA SPEC NAA+PROBE: NOT DETECTED
HCOV 229E RNA SPEC QL NAA+PROBE: NOT DETECTED
HCOV HKU1 RNA SPEC QL NAA+PROBE: NOT DETECTED
HCOV NL63 RNA SPEC QL NAA+PROBE: NOT DETECTED
HCOV OC43 RNA SPEC QL NAA+PROBE: NOT DETECTED
HCT VFR BLD AUTO: 39.7 % (ref 34–46.6)
HGB BLD-MCNC: 13.4 G/DL (ref 12–15.9)
HMPV RNA NPH QL NAA+NON-PROBE: NOT DETECTED
HPIV1 RNA ISLT QL NAA+PROBE: NOT DETECTED
HPIV2 RNA SPEC QL NAA+PROBE: NOT DETECTED
HPIV3 RNA NPH QL NAA+PROBE: NOT DETECTED
HPIV4 P GENE NPH QL NAA+PROBE: NOT DETECTED
IMM GRANULOCYTES # BLD AUTO: 0.03 10*3/MM3 (ref 0–0.05)
IMM GRANULOCYTES NFR BLD AUTO: 0.9 % (ref 0–0.5)
L PNEUMO1 AG UR QL IA: NEGATIVE
LYMPHOCYTES # BLD AUTO: 0.88 10*3/MM3 (ref 0.7–3.1)
LYMPHOCYTES NFR BLD AUTO: 27 % (ref 19.6–45.3)
M PNEUMO IGG SER IA-ACNC: NOT DETECTED
MCH RBC QN AUTO: 31.5 PG (ref 26.6–33)
MCHC RBC AUTO-ENTMCNC: 33.8 G/DL (ref 31.5–35.7)
MCV RBC AUTO: 93.4 FL (ref 79–97)
MONOCYTES # BLD AUTO: 0.33 10*3/MM3 (ref 0.1–0.9)
MONOCYTES NFR BLD AUTO: 10.1 % (ref 5–12)
NEUTROPHILS NFR BLD AUTO: 2.01 10*3/MM3 (ref 1.7–7)
NEUTROPHILS NFR BLD AUTO: 61.7 % (ref 42.7–76)
NRBC BLD AUTO-RTO: 0 /100 WBC (ref 0–0.2)
PLATELET # BLD AUTO: 190 10*3/MM3 (ref 140–450)
PMV BLD AUTO: 10.8 FL (ref 6–12)
POTASSIUM SERPL-SCNC: 4.8 MMOL/L (ref 3.5–5.2)
PROCALCITONIN SERPL-MCNC: 0.08 NG/ML (ref 0–0.25)
PROT SERPL-MCNC: 7.5 G/DL (ref 6–8.5)
QT INTERVAL: 440 MS
RBC # BLD AUTO: 4.25 10*6/MM3 (ref 3.77–5.28)
RHINOVIRUS RNA SPEC NAA+PROBE: NOT DETECTED
RSV RNA NPH QL NAA+NON-PROBE: NOT DETECTED
S PNEUM AG SPEC QL LA: NEGATIVE
SARS-COV-2 RNA PNL SPEC NAA+PROBE: DETECTED
SODIUM SERPL-SCNC: 132 MMOL/L (ref 136–145)
WBC NRBC COR # BLD: 3.26 10*3/MM3 (ref 3.4–10.8)

## 2022-01-15 PROCEDURE — XW033E5 INTRODUCTION OF REMDESIVIR ANTI-INFECTIVE INTO PERIPHERAL VEIN, PERCUTANEOUS APPROACH, NEW TECHNOLOGY GROUP 5: ICD-10-PCS | Performed by: INTERNAL MEDICINE

## 2022-01-15 PROCEDURE — 25010000002 ENOXAPARIN PER 10 MG: Performed by: INTERNAL MEDICINE

## 2022-01-15 PROCEDURE — 87040 BLOOD CULTURE FOR BACTERIA: CPT | Performed by: INTERNAL MEDICINE

## 2022-01-15 PROCEDURE — 63710000001 INSULIN LISPRO (HUMAN) PER 5 UNITS: Performed by: INTERNAL MEDICINE

## 2022-01-15 PROCEDURE — 83605 ASSAY OF LACTIC ACID: CPT | Performed by: INTERNAL MEDICINE

## 2022-01-15 PROCEDURE — 82962 GLUCOSE BLOOD TEST: CPT

## 2022-01-15 PROCEDURE — 25010000005 REMDESIVIR 100 MG RECONSTITUTED SOLUTION: Performed by: INTERNAL MEDICINE

## 2022-01-15 PROCEDURE — 94799 UNLISTED PULMONARY SVC/PX: CPT

## 2022-01-15 PROCEDURE — 87205 SMEAR GRAM STAIN: CPT | Performed by: INTERNAL MEDICINE

## 2022-01-15 PROCEDURE — 84145 PROCALCITONIN (PCT): CPT | Performed by: INTERNAL MEDICINE

## 2022-01-15 PROCEDURE — 82248 BILIRUBIN DIRECT: CPT | Performed by: INTERNAL MEDICINE

## 2022-01-15 PROCEDURE — 25010000002 FUROSEMIDE PER 20 MG: Performed by: INTERNAL MEDICINE

## 2022-01-15 PROCEDURE — 82728 ASSAY OF FERRITIN: CPT | Performed by: INTERNAL MEDICINE

## 2022-01-15 PROCEDURE — 63710000001 INSULIN DETEMIR PER 5 UNITS: Performed by: INTERNAL MEDICINE

## 2022-01-15 PROCEDURE — 0202U NFCT DS 22 TRGT SARS-COV-2: CPT | Performed by: INTERNAL MEDICINE

## 2022-01-15 PROCEDURE — 85025 COMPLETE CBC W/AUTO DIFF WBC: CPT | Performed by: INTERNAL MEDICINE

## 2022-01-15 PROCEDURE — 85379 FIBRIN DEGRADATION QUANT: CPT | Performed by: INTERNAL MEDICINE

## 2022-01-15 PROCEDURE — 25010000002 CEFTRIAXONE PER 250 MG: Performed by: INTERNAL MEDICINE

## 2022-01-15 PROCEDURE — 87070 CULTURE OTHR SPECIMN AEROBIC: CPT | Performed by: INTERNAL MEDICINE

## 2022-01-15 PROCEDURE — 94760 N-INVAS EAR/PLS OXIMETRY 1: CPT

## 2022-01-15 PROCEDURE — 36415 COLL VENOUS BLD VENIPUNCTURE: CPT | Performed by: INTERNAL MEDICINE

## 2022-01-15 PROCEDURE — 80053 COMPREHEN METABOLIC PANEL: CPT | Performed by: INTERNAL MEDICINE

## 2022-01-15 PROCEDURE — 25010000002 DEXAMETHASONE PER 1 MG: Performed by: INTERNAL MEDICINE

## 2022-01-15 PROCEDURE — 87804 INFLUENZA ASSAY W/OPTIC: CPT | Performed by: INTERNAL MEDICINE

## 2022-01-15 PROCEDURE — 86140 C-REACTIVE PROTEIN: CPT | Performed by: INTERNAL MEDICINE

## 2022-01-15 RX ORDER — ISOSORBIDE MONONITRATE 30 MG/1
60 TABLET, EXTENDED RELEASE ORAL
Status: DISCONTINUED | OUTPATIENT
Start: 2022-01-15 | End: 2022-01-19 | Stop reason: HOSPADM

## 2022-01-15 RX ORDER — GABAPENTIN 300 MG/1
600 CAPSULE ORAL EVERY 8 HOURS SCHEDULED
Status: DISCONTINUED | OUTPATIENT
Start: 2022-01-15 | End: 2022-01-15

## 2022-01-15 RX ORDER — ATORVASTATIN CALCIUM 20 MG/1
20 TABLET, FILM COATED ORAL NIGHTLY
Status: DISCONTINUED | OUTPATIENT
Start: 2022-01-15 | End: 2022-01-19 | Stop reason: HOSPADM

## 2022-01-15 RX ORDER — HYDROCODONE BITARTRATE AND ACETAMINOPHEN 5; 325 MG/1; MG/1
1 TABLET ORAL EVERY 6 HOURS PRN
Status: DISCONTINUED | OUTPATIENT
Start: 2022-01-15 | End: 2022-01-19 | Stop reason: HOSPADM

## 2022-01-15 RX ORDER — HYDROCODONE BITARTRATE AND ACETAMINOPHEN 7.5; 325 MG/1; MG/1
1 TABLET ORAL EVERY 8 HOURS
Status: DISCONTINUED | OUTPATIENT
Start: 2022-01-15 | End: 2022-01-19 | Stop reason: HOSPADM

## 2022-01-15 RX ORDER — METOPROLOL TARTRATE 50 MG/1
50 TABLET, FILM COATED ORAL DAILY
COMMUNITY

## 2022-01-15 RX ORDER — CEFTRIAXONE SODIUM 1 G/50ML
1 INJECTION, SOLUTION INTRAVENOUS NIGHTLY
Status: DISCONTINUED | OUTPATIENT
Start: 2022-01-15 | End: 2022-01-19 | Stop reason: HOSPADM

## 2022-01-15 RX ORDER — ACETAMINOPHEN 325 MG/1
650 TABLET ORAL EVERY 6 HOURS PRN
Status: DISCONTINUED | OUTPATIENT
Start: 2022-01-15 | End: 2022-01-19 | Stop reason: HOSPADM

## 2022-01-15 RX ADMIN — ATORVASTATIN CALCIUM 20 MG: 20 TABLET, FILM COATED ORAL at 21:53

## 2022-01-15 RX ADMIN — REMDESIVIR 200 MG: 100 INJECTION, POWDER, LYOPHILIZED, FOR SOLUTION INTRAVENOUS at 00:37

## 2022-01-15 RX ADMIN — GUAIFENESIN 1200 MG: 600 TABLET ORAL at 22:19

## 2022-01-15 RX ADMIN — FAMOTIDINE 20 MG: 20 TABLET ORAL at 08:24

## 2022-01-15 RX ADMIN — AZITHROMYCIN MONOHYDRATE 500 MG: 250 TABLET ORAL at 00:07

## 2022-01-15 RX ADMIN — BUDESONIDE 0.5 MG: 0.5 SUSPENSION RESPIRATORY (INHALATION) at 06:08

## 2022-01-15 RX ADMIN — CEFTRIAXONE SODIUM 1 G: 1 INJECTION, SOLUTION INTRAVENOUS at 21:52

## 2022-01-15 RX ADMIN — ENOXAPARIN SODIUM 40 MG: 40 INJECTION SUBCUTANEOUS at 21:47

## 2022-01-15 RX ADMIN — GUAIFENESIN 1200 MG: 600 TABLET ORAL at 08:42

## 2022-01-15 RX ADMIN — INSULIN LISPRO 10 UNITS: 100 INJECTION, SOLUTION INTRAVENOUS; SUBCUTANEOUS at 18:05

## 2022-01-15 RX ADMIN — ARFORMOTEROL TARTRATE 15 MCG: 15 SOLUTION RESPIRATORY (INHALATION) at 18:40

## 2022-01-15 RX ADMIN — INSULIN LISPRO 10 UNITS: 100 INJECTION, SOLUTION INTRAVENOUS; SUBCUTANEOUS at 18:06

## 2022-01-15 RX ADMIN — FUROSEMIDE 40 MG: 10 INJECTION, SOLUTION INTRAMUSCULAR; INTRAVENOUS at 21:48

## 2022-01-15 RX ADMIN — BENZONATATE 200 MG: 100 CAPSULE ORAL at 00:05

## 2022-01-15 RX ADMIN — FUROSEMIDE 40 MG: 10 INJECTION, SOLUTION INTRAMUSCULAR; INTRAVENOUS at 00:05

## 2022-01-15 RX ADMIN — IPRATROPIUM BROMIDE AND ALBUTEROL SULFATE 3 ML: 2.5; .5 SOLUTION RESPIRATORY (INHALATION) at 11:23

## 2022-01-15 RX ADMIN — IPRATROPIUM BROMIDE AND ALBUTEROL SULFATE 3 ML: 2.5; .5 SOLUTION RESPIRATORY (INHALATION) at 02:43

## 2022-01-15 RX ADMIN — AZITHROMYCIN MONOHYDRATE 500 MG: 250 TABLET ORAL at 21:47

## 2022-01-15 RX ADMIN — REMDESIVIR 100 MG: 100 INJECTION, POWDER, LYOPHILIZED, FOR SOLUTION INTRAVENOUS at 14:20

## 2022-01-15 RX ADMIN — INSULIN LISPRO 14 UNITS: 100 INJECTION, SOLUTION INTRAVENOUS; SUBCUTANEOUS at 11:36

## 2022-01-15 RX ADMIN — GABAPENTIN 800 MG: 400 CAPSULE ORAL at 21:48

## 2022-01-15 RX ADMIN — FAMOTIDINE 20 MG: 20 TABLET ORAL at 17:00

## 2022-01-15 RX ADMIN — ARFORMOTEROL TARTRATE 15 MCG: 15 SOLUTION RESPIRATORY (INHALATION) at 06:07

## 2022-01-15 RX ADMIN — ENOXAPARIN SODIUM 40 MG: 40 INJECTION SUBCUTANEOUS at 00:06

## 2022-01-15 RX ADMIN — INSULIN DETEMIR 40 UNITS: 100 INJECTION, SOLUTION SUBCUTANEOUS at 21:48

## 2022-01-15 RX ADMIN — GUAIFENESIN 1200 MG: 600 TABLET ORAL at 00:37

## 2022-01-15 RX ADMIN — ASPIRIN 81 MG CHEWABLE TABLET 81 MG: 81 TABLET CHEWABLE at 08:25

## 2022-01-15 RX ADMIN — METOPROLOL SUCCINATE 50 MG: 50 TABLET, EXTENDED RELEASE ORAL at 08:24

## 2022-01-15 RX ADMIN — IPRATROPIUM BROMIDE AND ALBUTEROL SULFATE 3 ML: 2.5; .5 SOLUTION RESPIRATORY (INHALATION) at 14:45

## 2022-01-15 RX ADMIN — HYDROCODONE BITARTRATE AND ACETAMINOPHEN 1 TABLET: 7.5; 325 TABLET ORAL at 06:13

## 2022-01-15 RX ADMIN — IPRATROPIUM BROMIDE AND ALBUTEROL SULFATE 3 ML: 2.5; .5 SOLUTION RESPIRATORY (INHALATION) at 18:40

## 2022-01-15 RX ADMIN — HYDROCODONE BITARTRATE AND ACETAMINOPHEN 1 TABLET: 5; 325 TABLET ORAL at 17:28

## 2022-01-15 RX ADMIN — IPRATROPIUM BROMIDE AND ALBUTEROL SULFATE 3 ML: 2.5; .5 SOLUTION RESPIRATORY (INHALATION) at 06:07

## 2022-01-15 RX ADMIN — INSULIN LISPRO 12 UNITS: 100 INJECTION, SOLUTION INTRAVENOUS; SUBCUTANEOUS at 08:42

## 2022-01-15 RX ADMIN — FUROSEMIDE 40 MG: 10 INJECTION, SOLUTION INTRAMUSCULAR; INTRAVENOUS at 08:25

## 2022-01-15 RX ADMIN — GABAPENTIN 800 MG: 400 CAPSULE ORAL at 08:25

## 2022-01-15 RX ADMIN — ISOSORBIDE MONONITRATE 60 MG: 30 TABLET ORAL at 12:54

## 2022-01-15 RX ADMIN — PRASUGREL 10 MG: 10 TABLET, FILM COATED ORAL at 08:45

## 2022-01-15 RX ADMIN — HYDROCODONE BITARTRATE AND ACETAMINOPHEN 1 TABLET: 7.5; 325 TABLET ORAL at 15:34

## 2022-01-15 RX ADMIN — BUDESONIDE 0.5 MG: 0.5 SUSPENSION RESPIRATORY (INHALATION) at 18:40

## 2022-01-15 RX ADMIN — GABAPENTIN 800 MG: 400 CAPSULE ORAL at 15:34

## 2022-01-15 RX ADMIN — INSULIN LISPRO 10 UNITS: 100 INJECTION, SOLUTION INTRAVENOUS; SUBCUTANEOUS at 12:54

## 2022-01-15 RX ADMIN — DEXAMETHASONE SODIUM PHOSPHATE 6 MG: 10 INJECTION INTRAMUSCULAR; INTRAVENOUS at 08:25

## 2022-01-15 NOTE — H&P
[facility]   HISTORY AND PHYSICAL    Patient Name: Nasima Dubose  : 1965  MRN: [id number]  Primary Care Physician:  Jack Muir MD  Date of admission: 2022    Subjective   Subjective     Chief Complaint:   Shortness of breath and low oxygen    HPI:    Nasima Dubose is a 56 y.o. female, with known history of CHF, COPD, type 2 diabetes, recently diagnosed with COVID on  with symptoms going on for some days.  Patient became more hypoxic and short of breath for last 2 days, her sats were running in the 80s, she was advised to go to ER by her PCP.  Patient reported to ER and was admitted for multifocal pneumonia and hypoxia.  When examined this morning she is awake alert breathing better, her sugars are running high.  No fever chills complains of tiredness and fatigue.  She is still on nasal cannula.    Review of Systems:    Fatigue and weakness.  Shortness of breath.  Cough.  Chronic poor vision from stroke.  No chest pain.  Sugars running high    Personal History     Past Medical History:   Diagnosis Date   • Anxiety     ANIXETY ATTACK AT WORK ABOUT A MONTH AGO   • Arthritis    • Condition not found     PSYCHIATRIC PROBLEMS- PT TOOK AN OVERDOSE OF MEDICATION FOR BLOOD SUGAR. TOOK 8 PILLS.   • Condition not found     HERBS USED- BC POWDER AS NEEDED   • Congestive heart failure (CHF) (H[facility])    • COPD (chronic obstructive pulmonary disease) ([facility])    • Diabetes (HCC)    • Diabetic neuropathy (HCC)    • Health care home, active care coordination     [facility] (FAX: [phone])   • History of anesthesia reaction     HARD TIME WAKING UP SOMETIME   • History of hiatal hernia    • History of kidney stones    • Hyperlipidemia    • Hypertension    • Irregular heartbeat     PT IS SEEING  (RECENTLY DID A 24-HOUR HEART MONITOR).   • Peripheral artery disease (HCC)    • Stroke ([facility])     PIN STROKE FROMA MIGRAINE/ 20 YEARS AGO   • Wears glasses        Past Surgical History:   Procedure  Laterality Date   • ABDOMINAL SURGERY      33 HERNIA REPAIR, GALLBLADER REMOVED   • BLADDER REPAIR     • CHOLECYSTECTOMY     • DILATATION AND CURETTAGE      X2   • HERNIA REPAIR     • HYSTERECTOMY  1992    2 CS   • TOE AMPUTATION Right 02/14/2017    RIGHT GREAT TOE   • TONSILLECTOMY         Family History: family history includes Anesthesia problems in her mother; Diabetes in her mother. Otherwise pertinent FHx was reviewed and not pertinent to current issue.    Social History:  reports that she has quit smoking. She has never used smokeless tobacco. She reports previous alcohol use. She reports previous drug use.    Home Medications:  Aspirin, HYDROcodone-acetaminophen, Insulin Degludec, Insulin Pen Needle, Vitamin C, albuterol sulfate HFA, furosemide, gabapentin, hydrOXYzine pamoate, insulin aspart, isosorbide mononitrate, lisinopril, loratadine, metFORMIN, metoprolol succinate XL, prasugrel, simvastatin, and sulfamethoxazole-trimethoprim      Allergies:  Allergies   Allergen Reactions   • Aminoglycosides Unknown - Low Severity   • Neosporin [Neomycin-Bacitracin Zn-Polymyx] Itching and Rash   • Polymyxin B Unknown - Low Severity   • Pramoxine Unknown - Low Severity       Objective   Objective     Vitals:   Temp:  [97.3 °F (36.3 °C)-99.5 °F (37.5 °C)] 97.7 °F (36.5 °C)  Heart Rate:  [67-93] 67  Resp:  [15-24] 22  BP: (145-160)/(55-81) 150/64    Physical Exam    Elderly female not in acute distress.  Heart regular.  Lungs bilateral rhonchi and coarse breath sounds.  Abdomen soft and obese nontender.  Extremities trace of edema.  Neuro awake alert and oriented      I have personally reviewed the results from the time of this admission to 1/15/2022 12:25 EST and agree with these findings:  [x]  Laboratory  []  Microbiology  [x]  Radiology  []  EKG/Telemetry   []  Cardiology/Vascular   []  Pathology  []  Old records  []  Other:    CBC:    WBC   Date Value Ref Range Status   01/15/2022 3.26 (L) 3.40 - 10.80 10*3/mm3  Final     RBC   Date Value Ref Range Status   01/15/2022 4.25 3.77 - 5.28 10*6/mm3 Final     Hemoglobin   Date Value Ref Range Status   01/15/2022 13.4 12.0 - 15.9 g/dL Final     Hematocrit   Date Value Ref Range Status   01/15/2022 39.7 34.0 - 46.6 % Final     MCV   Date Value Ref Range Status   01/15/2022 93.4 79.0 - 97.0 fL Final     MCH   Date Value Ref Range Status   01/15/2022 31.5 26.6 - 33.0 pg Final     MCHC   Date Value Ref Range Status   01/15/2022 33.8 31.5 - 35.7 g/dL Final     RDW   Date Value Ref Range Status   01/15/2022 15.5 (H) 12.3 - 15.4 % Final     RDW-SD   Date Value Ref Range Status   01/15/2022 53.5 37.0 - 54.0 fl Final     MPV   Date Value Ref Range Status   01/15/2022 10.8 6.0 - 12.0 fL Final     Platelets   Date Value Ref Range Status   01/15/2022 190 140 - 450 10*3/mm3 Final     Neutrophil %   Date Value Ref Range Status   01/15/2022 61.7 42.7 - 76.0 % Final     Lymphocyte %   Date Value Ref Range Status   01/15/2022 27.0 19.6 - 45.3 % Final     Monocyte %   Date Value Ref Range Status   01/15/2022 10.1 5.0 - 12.0 % Final     Eosinophil %   Date Value Ref Range Status   01/15/2022 0.0 (L) 0.3 - 6.2 % Final     Basophil %   Date Value Ref Range Status   01/15/2022 0.3 0.0 - 1.5 % Final     Immature Grans %   Date Value Ref Range Status   01/15/2022 0.9 (H) 0.0 - 0.5 % Final     Neutrophils, Absolute   Date Value Ref Range Status   01/15/2022 2.01 1.70 - 7.00 10*3/mm3 Final     Lymphocytes, Absolute   Date Value Ref Range Status   01/15/2022 0.88 0.70 - 3.10 10*3/mm3 Final     Monocytes, Absolute   Date Value Ref Range Status   01/15/2022 0.33 0.10 - 0.90 10*3/mm3 Final     Eosinophils, Absolute   Date Value Ref Range Status   01/15/2022 0.00 0.00 - 0.40 10*3/mm3 Final     Basophils, Absolute   Date Value Ref Range Status   01/15/2022 0.01 0.00 - 0.20 10*3/mm3 Final     Immature Grans, Absolute   Date Value Ref Range Status   01/15/2022 0.03 0.00 - 0.05 10*3/mm3 Final     nRBC   Date Value  Ref Range Status   01/15/2022 0.0 0.0 - 0.2 /100 WBC Final        BMP:    Lab Results   Component Value Date    GLUCOSE 474 (C) 01/15/2022    BUN 22 (H) 01/15/2022    CREATININE 0.83 01/15/2022    EGFRIFNONA 71 01/15/2022    BCR 26.5 (H) 01/15/2022    K 4.8 01/15/2022    CO2 19.7 (L) 01/15/2022    CALCIUM 8.6 01/15/2022    ALBUMIN 3.80 01/15/2022    LABIL2 0.8 (L) 04/20/2021    AST 15 01/15/2022    ALT 11 01/15/2022        CT Chest Without Contrast Diagnostic    Result Date: 1/14/2022    1. Findings consistent with multifocal pneumonia, likely related to acute viral illness given predominately subpleural ground-glass changes. 2. Mildly prominent hilar and mediastinal nodes, likely reactive. 3. Extensive atherosclerosis. 4. Ancillary findings as described above..    IFRAH BERMUDEZ MD       Electronically Signed and Approved By: IFRAH BERMUDEZ MD on 1/14/2022 at 18:18             XR Chest 1 View    Result Date: 1/14/2022    Mild patchy airspace changes present within the subpleural left upper lobe and the left lung base, likely related to pneumonia.  There is a mild underlying pulmonary edema pattern.       IFRAH BERMUDEZ MD       Electronically Signed and Approved By: IFRAH BERMUDEZ MD on 1/14/2022 at 16:53                      Assessment/Plan   Assessment / Plan       Current Diagnosis:  Active Hospital Problems    Diagnosis    • **Acute hypoxemic respiratory failure due to COVID-19 (HCC)    • Multifocal pneumonia    • Uncontrolled diabetes mellitus (HCC)    • COPD exacerbation (HCC)      Plan:   Admitted to hospital.  IV remdesivir and steroids.  Oxygen supplementation.  Neb treatments.    Sliding scale with regular scheduled dosing of insulin.  Patient has poorly controlled diabetes from the past.  Start essential home meds.  Monitor inflammatory markers      DVT prophylaxis:  Medical DVT prophylaxis orders are present.    GI Prophylaxis:    PPI    CODE STATUS:    Code Status (Patient has no pulse and is not breathing):  CPR (Attempt to Resuscitate)  Medical Interventions (Patient has pulse or is breathing): Full Support    Admission Status:  I believe this patient meets inpatient t status.             I have dictated this note utilizing Dragon Dictation.             Please note that portions of this note were completed with a voice recognition program.             Part of this note may be an electronic transcription/translation of spoken language to printed text         using the Dragon Dictation System.       Electronically signed by Gordon Syed MD, 01/15/22, 12:19 PM EST.    Total time spent with in evaluation and management:

## 2022-01-15 NOTE — SIGNIFICANT NOTE
Pt has home health changing dressings as well as goes to Vascular wound clinic behind [facility]. Pt states area opens and closes is a chronic wound.

## 2022-01-15 NOTE — PLAN OF CARE
Goal Outcome Evaluation:  Plan of Care Reviewed With: patient        Progress: no change Pt was new admit. Pt room air. Denies shortness of air. Productive congested frequent cough. Sputum was sent to lab for culture as well as covid swab and flu swab. Urine was sent by ED for UA & C&S. Did call lab and lab added legionella and pneumonia on the urine already sent to the lab by a  ED. Pt has diabetes and chronic wound below where has a Right great toe amputation on bottom of foot is the area that is open with crusty callus edges of wound. No drainage. Pt states the dressing that wound care did outpt was on woiund which is an Aquacel AG after cleansing with normal saline and a dry dressing. Did put in consult for wound nurse. Pt

## 2022-01-15 NOTE — PLAN OF CARE
Goal Outcome Evaluation:           Progress: no change  Outcome Summary: Patient received remdisvier as ordered. Scheduled pain meds and gabapentin given like patient takes at home per MD approval. Patient remains on RA. Lactate critcally high above 3.0. MD aware. Patient states SOA is slightly improved.

## 2022-01-16 LAB
ALBUMIN SERPL-MCNC: 3.6 G/DL (ref 3.5–5.2)
ALBUMIN/GLOB SERPL: 1.1 G/DL
ALP SERPL-CCNC: 65 U/L (ref 39–117)
ALT SERPL W P-5'-P-CCNC: 10 U/L (ref 1–33)
ANION GAP SERPL CALCULATED.3IONS-SCNC: 15.3 MMOL/L (ref 5–15)
AST SERPL-CCNC: 24 U/L (ref 1–32)
BASOPHILS # BLD AUTO: 0.03 10*3/MM3 (ref 0–0.2)
BASOPHILS NFR BLD AUTO: 0.5 % (ref 0–1.5)
BILIRUB CONJ SERPL-MCNC: <0.2 MG/DL (ref 0–0.3)
BILIRUB SERPL-MCNC: 0.3 MG/DL (ref 0–1.2)
BUN SERPL-MCNC: 32 MG/DL (ref 6–20)
BUN/CREAT SERPL: 32.7 (ref 7–25)
CALCIUM SPEC-SCNC: 9.1 MG/DL (ref 8.6–10.5)
CHLORIDE SERPL-SCNC: 100 MMOL/L (ref 98–107)
CO2 SERPL-SCNC: 21.7 MMOL/L (ref 22–29)
CREAT SERPL-MCNC: 0.98 MG/DL (ref 0.57–1)
CRP SERPL-MCNC: 2.73 MG/DL (ref 0–0.5)
D-LACTATE SERPL-SCNC: 1.4 MMOL/L (ref 0.5–2)
DEPRECATED RDW RBC AUTO: 52.6 FL (ref 37–54)
EOSINOPHIL # BLD AUTO: 0 10*3/MM3 (ref 0–0.4)
EOSINOPHIL NFR BLD AUTO: 0 % (ref 0.3–6.2)
ERYTHROCYTE [DISTWIDTH] IN BLOOD BY AUTOMATED COUNT: 15.3 % (ref 12.3–15.4)
FERRITIN SERPL-MCNC: 366.4 NG/ML (ref 13–150)
GFR SERPL CREATININE-BSD FRML MDRD: 58 ML/MIN/1.73
GLOBULIN UR ELPH-MCNC: 3.4 GM/DL
GLUCOSE BLDC GLUCOMTR-MCNC: 176 MG/DL (ref 70–99)
GLUCOSE BLDC GLUCOMTR-MCNC: 227 MG/DL (ref 70–99)
GLUCOSE BLDC GLUCOMTR-MCNC: 236 MG/DL (ref 70–99)
GLUCOSE BLDC GLUCOMTR-MCNC: 236 MG/DL (ref 70–99)
GLUCOSE SERPL-MCNC: 216 MG/DL (ref 65–99)
HCT VFR BLD AUTO: 38.3 % (ref 34–46.6)
HGB BLD-MCNC: 12.7 G/DL (ref 12–15.9)
IMM GRANULOCYTES # BLD AUTO: 0.03 10*3/MM3 (ref 0–0.05)
IMM GRANULOCYTES NFR BLD AUTO: 0.5 % (ref 0–0.5)
LYMPHOCYTES # BLD AUTO: 1.87 10*3/MM3 (ref 0.7–3.1)
LYMPHOCYTES NFR BLD AUTO: 33.2 % (ref 19.6–45.3)
MCH RBC QN AUTO: 30.8 PG (ref 26.6–33)
MCHC RBC AUTO-ENTMCNC: 33.2 G/DL (ref 31.5–35.7)
MCV RBC AUTO: 92.7 FL (ref 79–97)
MONOCYTES # BLD AUTO: 0.79 10*3/MM3 (ref 0.1–0.9)
MONOCYTES NFR BLD AUTO: 14 % (ref 5–12)
NEUTROPHILS NFR BLD AUTO: 2.92 10*3/MM3 (ref 1.7–7)
NEUTROPHILS NFR BLD AUTO: 51.8 % (ref 42.7–76)
NRBC BLD AUTO-RTO: 0 /100 WBC (ref 0–0.2)
PLATELET # BLD AUTO: 201 10*3/MM3 (ref 140–450)
PMV BLD AUTO: 10.8 FL (ref 6–12)
POTASSIUM SERPL-SCNC: 4.7 MMOL/L (ref 3.5–5.2)
PROCALCITONIN SERPL-MCNC: 0.07 NG/ML (ref 0–0.25)
PROT SERPL-MCNC: 7 G/DL (ref 6–8.5)
RBC # BLD AUTO: 4.13 10*6/MM3 (ref 3.77–5.28)
SODIUM SERPL-SCNC: 137 MMOL/L (ref 136–145)
WBC NRBC COR # BLD: 5.64 10*3/MM3 (ref 3.4–10.8)

## 2022-01-16 PROCEDURE — 25010000005 REMDESIVIR 100 MG RECONSTITUTED SOLUTION: Performed by: INTERNAL MEDICINE

## 2022-01-16 PROCEDURE — 25010000002 ENOXAPARIN PER 10 MG: Performed by: INTERNAL MEDICINE

## 2022-01-16 PROCEDURE — 25010000002 CEFTRIAXONE PER 250 MG: Performed by: INTERNAL MEDICINE

## 2022-01-16 PROCEDURE — 25010000002 DEXAMETHASONE PER 1 MG: Performed by: INTERNAL MEDICINE

## 2022-01-16 PROCEDURE — 82248 BILIRUBIN DIRECT: CPT | Performed by: INTERNAL MEDICINE

## 2022-01-16 PROCEDURE — 63710000001 INSULIN DETEMIR PER 5 UNITS: Performed by: INTERNAL MEDICINE

## 2022-01-16 PROCEDURE — 63710000001 INSULIN LISPRO (HUMAN) PER 5 UNITS: Performed by: INTERNAL MEDICINE

## 2022-01-16 PROCEDURE — 82962 GLUCOSE BLOOD TEST: CPT

## 2022-01-16 PROCEDURE — 83605 ASSAY OF LACTIC ACID: CPT | Performed by: INTERNAL MEDICINE

## 2022-01-16 PROCEDURE — 85025 COMPLETE CBC W/AUTO DIFF WBC: CPT | Performed by: INTERNAL MEDICINE

## 2022-01-16 PROCEDURE — 84145 PROCALCITONIN (PCT): CPT | Performed by: INTERNAL MEDICINE

## 2022-01-16 PROCEDURE — 36415 COLL VENOUS BLD VENIPUNCTURE: CPT | Performed by: INTERNAL MEDICINE

## 2022-01-16 PROCEDURE — 86140 C-REACTIVE PROTEIN: CPT | Performed by: INTERNAL MEDICINE

## 2022-01-16 PROCEDURE — 82728 ASSAY OF FERRITIN: CPT | Performed by: INTERNAL MEDICINE

## 2022-01-16 PROCEDURE — 25010000002 FUROSEMIDE PER 20 MG: Performed by: INTERNAL MEDICINE

## 2022-01-16 PROCEDURE — 94799 UNLISTED PULMONARY SVC/PX: CPT

## 2022-01-16 PROCEDURE — 94760 N-INVAS EAR/PLS OXIMETRY 1: CPT

## 2022-01-16 PROCEDURE — 80053 COMPREHEN METABOLIC PANEL: CPT | Performed by: INTERNAL MEDICINE

## 2022-01-16 RX ORDER — IPRATROPIUM BROMIDE AND ALBUTEROL SULFATE 2.5; .5 MG/3ML; MG/3ML
3 SOLUTION RESPIRATORY (INHALATION)
Status: DISCONTINUED | OUTPATIENT
Start: 2022-01-17 | End: 2022-01-19

## 2022-01-16 RX ADMIN — FAMOTIDINE 20 MG: 20 TABLET ORAL at 16:36

## 2022-01-16 RX ADMIN — ASPIRIN 81 MG CHEWABLE TABLET 81 MG: 81 TABLET CHEWABLE at 08:56

## 2022-01-16 RX ADMIN — IPRATROPIUM BROMIDE AND ALBUTEROL SULFATE 3 ML: 2.5; .5 SOLUTION RESPIRATORY (INHALATION) at 01:03

## 2022-01-16 RX ADMIN — IPRATROPIUM BROMIDE AND ALBUTEROL SULFATE 3 ML: 2.5; .5 SOLUTION RESPIRATORY (INHALATION) at 10:48

## 2022-01-16 RX ADMIN — INSULIN DETEMIR 40 UNITS: 100 INJECTION, SOLUTION SUBCUTANEOUS at 21:26

## 2022-01-16 RX ADMIN — ATORVASTATIN CALCIUM 20 MG: 20 TABLET, FILM COATED ORAL at 21:24

## 2022-01-16 RX ADMIN — CEFTRIAXONE SODIUM 1 G: 1 INJECTION, SOLUTION INTRAVENOUS at 21:25

## 2022-01-16 RX ADMIN — GABAPENTIN 800 MG: 400 CAPSULE ORAL at 21:24

## 2022-01-16 RX ADMIN — ARFORMOTEROL TARTRATE 15 MCG: 15 SOLUTION RESPIRATORY (INHALATION) at 19:18

## 2022-01-16 RX ADMIN — BENZONATATE 200 MG: 100 CAPSULE ORAL at 13:45

## 2022-01-16 RX ADMIN — GABAPENTIN 800 MG: 400 CAPSULE ORAL at 16:36

## 2022-01-16 RX ADMIN — BUDESONIDE 0.5 MG: 0.5 SUSPENSION RESPIRATORY (INHALATION) at 19:18

## 2022-01-16 RX ADMIN — IPRATROPIUM BROMIDE AND ALBUTEROL SULFATE 3 ML: 2.5; .5 SOLUTION RESPIRATORY (INHALATION) at 06:20

## 2022-01-16 RX ADMIN — METOPROLOL SUCCINATE 50 MG: 50 TABLET, EXTENDED RELEASE ORAL at 08:56

## 2022-01-16 RX ADMIN — BUDESONIDE 0.5 MG: 0.5 SUSPENSION RESPIRATORY (INHALATION) at 06:20

## 2022-01-16 RX ADMIN — GUAIFENESIN 1200 MG: 600 TABLET ORAL at 09:25

## 2022-01-16 RX ADMIN — INSULIN LISPRO 5 UNITS: 100 INJECTION, SOLUTION INTRAVENOUS; SUBCUTANEOUS at 12:20

## 2022-01-16 RX ADMIN — ISOSORBIDE MONONITRATE 60 MG: 30 TABLET ORAL at 08:55

## 2022-01-16 RX ADMIN — INSULIN LISPRO 10 UNITS: 100 INJECTION, SOLUTION INTRAVENOUS; SUBCUTANEOUS at 12:20

## 2022-01-16 RX ADMIN — ARFORMOTEROL TARTRATE 15 MCG: 15 SOLUTION RESPIRATORY (INHALATION) at 06:20

## 2022-01-16 RX ADMIN — FAMOTIDINE 20 MG: 20 TABLET ORAL at 06:59

## 2022-01-16 RX ADMIN — INSULIN LISPRO 5 UNITS: 100 INJECTION, SOLUTION INTRAVENOUS; SUBCUTANEOUS at 18:08

## 2022-01-16 RX ADMIN — INSULIN LISPRO 10 UNITS: 100 INJECTION, SOLUTION INTRAVENOUS; SUBCUTANEOUS at 18:07

## 2022-01-16 RX ADMIN — AZITHROMYCIN MONOHYDRATE 500 MG: 250 TABLET ORAL at 21:25

## 2022-01-16 RX ADMIN — GABAPENTIN 800 MG: 400 CAPSULE ORAL at 08:56

## 2022-01-16 RX ADMIN — FUROSEMIDE 40 MG: 10 INJECTION, SOLUTION INTRAMUSCULAR; INTRAVENOUS at 21:25

## 2022-01-16 RX ADMIN — INSULIN LISPRO 3 UNITS: 100 INJECTION, SOLUTION INTRAVENOUS; SUBCUTANEOUS at 08:54

## 2022-01-16 RX ADMIN — ENOXAPARIN SODIUM 40 MG: 40 INJECTION SUBCUTANEOUS at 21:26

## 2022-01-16 RX ADMIN — PRASUGREL 10 MG: 10 TABLET, FILM COATED ORAL at 09:25

## 2022-01-16 RX ADMIN — HYDROCODONE BITARTRATE AND ACETAMINOPHEN 1 TABLET: 7.5; 325 TABLET ORAL at 00:56

## 2022-01-16 RX ADMIN — GUAIFENESIN 1200 MG: 600 TABLET ORAL at 21:25

## 2022-01-16 RX ADMIN — IPRATROPIUM BROMIDE AND ALBUTEROL SULFATE 3 ML: 2.5; .5 SOLUTION RESPIRATORY (INHALATION) at 03:03

## 2022-01-16 RX ADMIN — HYDROCODONE BITARTRATE AND ACETAMINOPHEN 1 TABLET: 7.5; 325 TABLET ORAL at 08:56

## 2022-01-16 RX ADMIN — IPRATROPIUM BROMIDE AND ALBUTEROL SULFATE 3 ML: 2.5; .5 SOLUTION RESPIRATORY (INHALATION) at 15:17

## 2022-01-16 RX ADMIN — INSULIN LISPRO 10 UNITS: 100 INJECTION, SOLUTION INTRAVENOUS; SUBCUTANEOUS at 08:54

## 2022-01-16 RX ADMIN — IPRATROPIUM BROMIDE AND ALBUTEROL SULFATE 3 ML: 2.5; .5 SOLUTION RESPIRATORY (INHALATION) at 19:18

## 2022-01-16 RX ADMIN — DEXAMETHASONE SODIUM PHOSPHATE 6 MG: 10 INJECTION INTRAMUSCULAR; INTRAVENOUS at 08:56

## 2022-01-16 RX ADMIN — FUROSEMIDE 40 MG: 10 INJECTION, SOLUTION INTRAMUSCULAR; INTRAVENOUS at 08:56

## 2022-01-16 RX ADMIN — REMDESIVIR 100 MG: 100 INJECTION, POWDER, LYOPHILIZED, FOR SOLUTION INTRAVENOUS at 13:45

## 2022-01-16 RX ADMIN — HYDROCODONE BITARTRATE AND ACETAMINOPHEN 1 TABLET: 7.5; 325 TABLET ORAL at 16:36

## 2022-01-16 NOTE — PROGRESS NOTES
Psychiatric     Progress Note    Patient Name: Nasima Dubose  : 1965  MRN: [id number]  Primary Care Physician:  Jack Muir MD  Date of admission: 2022      Subjective   Brief summary.  Follow-up on shortness of breath and hyperglycemia      HPI:  Patient admitted with COVID-pneumonia, feeling better less short of breath, anxiety and pain better, sugars improving    Review of Systems     Fatigue, no chest pain, some shortness of breath, minimal cough, hurting all over      Objective     Vitals:   Temp:  [98.1 °F (36.7 °C)-98.9 °F (37.2 °C)] 98.3 °F (36.8 °C)  Heart Rate:  [55-79] 55  Resp:  [16-22] 20  BP: (114-167)/(49-72) 114/49    Physical Exam :     Elderly female, not in acute distress obese, heart regular, lungs diminished breath sounds bilaterally with rhonchi.  Abdomen obese and soft, extremities trace of edema neurologically awake alert and oriented      Result Review:  I have personally reviewed the results from the time of this admission to 2022 14:26 EST and agree with these findings:  [x]  Laboratory  []  Microbiology  []  Radiology  []  EKG/Telemetry   []  Cardiology/Vascular   []  Pathology  []  Old records  []  Other:           Assessment / Plan       Active Hospital Problems:  Active Hospital Problems    Diagnosis    • **Acute hypoxemic respiratory failure due to COVID-19 (HCC)    • Multifocal pneumonia    • Uncontrolled diabetes mellitus (HCC)    • COPD exacerbation (HCC)        Plan:   Oxygenation improving, continue treatment for COVID-pneumonia, sugars better, continue sliding scale, monitor labs.  Discussed with RN       DVT prophylaxis:  Medical DVT prophylaxis orders are present.    CODE STATUS:   Code Status (Patient has no pulse and is not breathing): CPR (Attempt to Resuscitate)  Medical Interventions (Patient has pulse or is breathing): Full Support            Electronically signed by Gordon Syed MD, 22, 2:26 PM EST.

## 2022-01-16 NOTE — PLAN OF CARE
Goal Outcome Evaluation:  Plan of Care Reviewed With: patient        Progress: improving pt's blood sugar 194 at bedtime.much better than before. Pt productive cough creamy thick sputum.  Wheezing faint. Diminished breath sounds in the bases. Chest expansion symmetric.  Room air oxygen level is low 90's. Maintaining. Will continue to monitor..

## 2022-01-17 PROBLEM — R09.02 HYPOXIA: Status: ACTIVE | Noted: 2022-01-17

## 2022-01-17 LAB
ALBUMIN SERPL-MCNC: 3.8 G/DL (ref 3.5–5.2)
ALBUMIN/GLOB SERPL: 1.2 G/DL
ALP SERPL-CCNC: 70 U/L (ref 39–117)
ALT SERPL W P-5'-P-CCNC: 9 U/L (ref 1–33)
ANION GAP SERPL CALCULATED.3IONS-SCNC: 13.4 MMOL/L (ref 5–15)
AST SERPL-CCNC: 12 U/L (ref 1–32)
BACTERIA SPEC RESP CULT: NORMAL
BASOPHILS # BLD AUTO: 0.03 10*3/MM3 (ref 0–0.2)
BASOPHILS NFR BLD AUTO: 0.4 % (ref 0–1.5)
BILIRUB CONJ SERPL-MCNC: <0.2 MG/DL (ref 0–0.3)
BILIRUB SERPL-MCNC: 0.3 MG/DL (ref 0–1.2)
BUN SERPL-MCNC: 37 MG/DL (ref 6–20)
BUN/CREAT SERPL: 33.9 (ref 7–25)
CALCIUM SPEC-SCNC: 9.4 MG/DL (ref 8.6–10.5)
CHLORIDE SERPL-SCNC: 99 MMOL/L (ref 98–107)
CO2 SERPL-SCNC: 25.6 MMOL/L (ref 22–29)
CREAT SERPL-MCNC: 1.09 MG/DL (ref 0.57–1)
CRP SERPL-MCNC: 1.33 MG/DL (ref 0–0.5)
D DIMER PPP FEU-MCNC: 0.41 MG/L (FEU) (ref 0–0.59)
D-LACTATE SERPL-SCNC: 1.4 MMOL/L (ref 0.5–2)
DEPRECATED RDW RBC AUTO: 52.9 FL (ref 37–54)
EOSINOPHIL # BLD AUTO: 0.01 10*3/MM3 (ref 0–0.4)
EOSINOPHIL NFR BLD AUTO: 0.1 % (ref 0.3–6.2)
ERYTHROCYTE [DISTWIDTH] IN BLOOD BY AUTOMATED COUNT: 15.5 % (ref 12.3–15.4)
FERRITIN SERPL-MCNC: 306.1 NG/ML (ref 13–150)
GFR SERPL CREATININE-BSD FRML MDRD: 52 ML/MIN/1.73
GLOBULIN UR ELPH-MCNC: 3.3 GM/DL
GLUCOSE BLDC GLUCOMTR-MCNC: 159 MG/DL (ref 70–99)
GLUCOSE BLDC GLUCOMTR-MCNC: 193 MG/DL (ref 70–99)
GLUCOSE BLDC GLUCOMTR-MCNC: 356 MG/DL (ref 70–99)
GLUCOSE SERPL-MCNC: 236 MG/DL (ref 65–99)
GRAM STN SPEC: NORMAL
HCT VFR BLD AUTO: 39.7 % (ref 34–46.6)
HGB BLD-MCNC: 13.2 G/DL (ref 12–15.9)
IMM GRANULOCYTES # BLD AUTO: 0.05 10*3/MM3 (ref 0–0.05)
IMM GRANULOCYTES NFR BLD AUTO: 0.7 % (ref 0–0.5)
LYMPHOCYTES # BLD AUTO: 2.26 10*3/MM3 (ref 0.7–3.1)
LYMPHOCYTES NFR BLD AUTO: 33.6 % (ref 19.6–45.3)
MCH RBC QN AUTO: 30.8 PG (ref 26.6–33)
MCHC RBC AUTO-ENTMCNC: 33.2 G/DL (ref 31.5–35.7)
MCV RBC AUTO: 92.8 FL (ref 79–97)
MONOCYTES # BLD AUTO: 0.73 10*3/MM3 (ref 0.1–0.9)
MONOCYTES NFR BLD AUTO: 10.9 % (ref 5–12)
NEUTROPHILS NFR BLD AUTO: 3.64 10*3/MM3 (ref 1.7–7)
NEUTROPHILS NFR BLD AUTO: 54.3 % (ref 42.7–76)
NRBC BLD AUTO-RTO: 0 /100 WBC (ref 0–0.2)
PLATELET # BLD AUTO: 236 10*3/MM3 (ref 140–450)
PMV BLD AUTO: 10.6 FL (ref 6–12)
POTASSIUM SERPL-SCNC: 4.1 MMOL/L (ref 3.5–5.2)
PROT SERPL-MCNC: 7.1 G/DL (ref 6–8.5)
RBC # BLD AUTO: 4.28 10*6/MM3 (ref 3.77–5.28)
SODIUM SERPL-SCNC: 138 MMOL/L (ref 136–145)
WBC NRBC COR # BLD: 6.72 10*3/MM3 (ref 3.4–10.8)

## 2022-01-17 PROCEDURE — 25010000002 ENOXAPARIN PER 10 MG: Performed by: INTERNAL MEDICINE

## 2022-01-17 PROCEDURE — 82962 GLUCOSE BLOOD TEST: CPT

## 2022-01-17 PROCEDURE — 94799 UNLISTED PULMONARY SVC/PX: CPT

## 2022-01-17 PROCEDURE — 36415 COLL VENOUS BLD VENIPUNCTURE: CPT | Performed by: INTERNAL MEDICINE

## 2022-01-17 PROCEDURE — 83605 ASSAY OF LACTIC ACID: CPT | Performed by: INTERNAL MEDICINE

## 2022-01-17 PROCEDURE — 94760 N-INVAS EAR/PLS OXIMETRY 1: CPT

## 2022-01-17 PROCEDURE — 25010000002 FUROSEMIDE PER 20 MG: Performed by: INTERNAL MEDICINE

## 2022-01-17 PROCEDURE — 63710000001 INSULIN LISPRO (HUMAN) PER 5 UNITS: Performed by: INTERNAL MEDICINE

## 2022-01-17 PROCEDURE — 86140 C-REACTIVE PROTEIN: CPT | Performed by: INTERNAL MEDICINE

## 2022-01-17 PROCEDURE — 25010000002 DEXAMETHASONE PER 1 MG: Performed by: INTERNAL MEDICINE

## 2022-01-17 PROCEDURE — 25010000005 REMDESIVIR 100 MG RECONSTITUTED SOLUTION: Performed by: INTERNAL MEDICINE

## 2022-01-17 PROCEDURE — 80053 COMPREHEN METABOLIC PANEL: CPT | Performed by: INTERNAL MEDICINE

## 2022-01-17 PROCEDURE — 85379 FIBRIN DEGRADATION QUANT: CPT | Performed by: INTERNAL MEDICINE

## 2022-01-17 PROCEDURE — 25010000002 CEFTRIAXONE PER 250 MG: Performed by: INTERNAL MEDICINE

## 2022-01-17 PROCEDURE — 63710000001 INSULIN DETEMIR PER 5 UNITS: Performed by: INTERNAL MEDICINE

## 2022-01-17 PROCEDURE — 85025 COMPLETE CBC W/AUTO DIFF WBC: CPT | Performed by: INTERNAL MEDICINE

## 2022-01-17 PROCEDURE — 82248 BILIRUBIN DIRECT: CPT | Performed by: INTERNAL MEDICINE

## 2022-01-17 PROCEDURE — 82728 ASSAY OF FERRITIN: CPT | Performed by: INTERNAL MEDICINE

## 2022-01-17 RX ADMIN — GUAIFENESIN 1200 MG: 600 TABLET ORAL at 20:50

## 2022-01-17 RX ADMIN — FAMOTIDINE 20 MG: 20 TABLET ORAL at 10:11

## 2022-01-17 RX ADMIN — AZITHROMYCIN MONOHYDRATE 500 MG: 250 TABLET ORAL at 21:37

## 2022-01-17 RX ADMIN — METOPROLOL SUCCINATE 50 MG: 50 TABLET, EXTENDED RELEASE ORAL at 10:07

## 2022-01-17 RX ADMIN — HYDROCODONE BITARTRATE AND ACETAMINOPHEN 1 TABLET: 7.5; 325 TABLET ORAL at 17:01

## 2022-01-17 RX ADMIN — PRASUGREL 10 MG: 10 TABLET, FILM COATED ORAL at 10:07

## 2022-01-17 RX ADMIN — ARFORMOTEROL TARTRATE 15 MCG: 15 SOLUTION RESPIRATORY (INHALATION) at 18:19

## 2022-01-17 RX ADMIN — HYDROCODONE BITARTRATE AND ACETAMINOPHEN 1 TABLET: 7.5; 325 TABLET ORAL at 10:07

## 2022-01-17 RX ADMIN — INSULIN LISPRO 10 UNITS: 100 INJECTION, SOLUTION INTRAVENOUS; SUBCUTANEOUS at 10:07

## 2022-01-17 RX ADMIN — BUDESONIDE 0.5 MG: 0.5 SUSPENSION RESPIRATORY (INHALATION) at 18:19

## 2022-01-17 RX ADMIN — FAMOTIDINE 20 MG: 20 TABLET ORAL at 06:01

## 2022-01-17 RX ADMIN — REMDESIVIR 100 MG: 100 INJECTION, POWDER, LYOPHILIZED, FOR SOLUTION INTRAVENOUS at 14:46

## 2022-01-17 RX ADMIN — INSULIN LISPRO 10 UNITS: 100 INJECTION, SOLUTION INTRAVENOUS; SUBCUTANEOUS at 11:54

## 2022-01-17 RX ADMIN — GABAPENTIN 800 MG: 400 CAPSULE ORAL at 10:07

## 2022-01-17 RX ADMIN — INSULIN LISPRO 3 UNITS: 100 INJECTION, SOLUTION INTRAVENOUS; SUBCUTANEOUS at 10:08

## 2022-01-17 RX ADMIN — ARFORMOTEROL TARTRATE 15 MCG: 15 SOLUTION RESPIRATORY (INHALATION) at 06:32

## 2022-01-17 RX ADMIN — FUROSEMIDE 40 MG: 10 INJECTION, SOLUTION INTRAMUSCULAR; INTRAVENOUS at 20:52

## 2022-01-17 RX ADMIN — FAMOTIDINE 20 MG: 20 TABLET ORAL at 17:00

## 2022-01-17 RX ADMIN — CEFTRIAXONE SODIUM 1 G: 1 INJECTION, SOLUTION INTRAVENOUS at 20:51

## 2022-01-17 RX ADMIN — FUROSEMIDE 40 MG: 10 INJECTION, SOLUTION INTRAMUSCULAR; INTRAVENOUS at 10:08

## 2022-01-17 RX ADMIN — HYDROCODONE BITARTRATE AND ACETAMINOPHEN 1 TABLET: 7.5; 325 TABLET ORAL at 23:37

## 2022-01-17 RX ADMIN — ASPIRIN 81 MG CHEWABLE TABLET 81 MG: 81 TABLET CHEWABLE at 10:07

## 2022-01-17 RX ADMIN — ISOSORBIDE MONONITRATE 60 MG: 30 TABLET ORAL at 10:07

## 2022-01-17 RX ADMIN — IPRATROPIUM BROMIDE AND ALBUTEROL SULFATE 3 ML: .5; 3 SOLUTION RESPIRATORY (INHALATION) at 06:32

## 2022-01-17 RX ADMIN — DEXAMETHASONE SODIUM PHOSPHATE 6 MG: 10 INJECTION INTRAMUSCULAR; INTRAVENOUS at 10:08

## 2022-01-17 RX ADMIN — INSULIN LISPRO 10 UNITS: 100 INJECTION, SOLUTION INTRAVENOUS; SUBCUTANEOUS at 17:00

## 2022-01-17 RX ADMIN — INSULIN LISPRO 3 UNITS: 100 INJECTION, SOLUTION INTRAVENOUS; SUBCUTANEOUS at 11:54

## 2022-01-17 RX ADMIN — GABAPENTIN 800 MG: 400 CAPSULE ORAL at 20:50

## 2022-01-17 RX ADMIN — GUAIFENESIN 1200 MG: 600 TABLET ORAL at 10:07

## 2022-01-17 RX ADMIN — IPRATROPIUM BROMIDE AND ALBUTEROL SULFATE 3 ML: .5; 3 SOLUTION RESPIRATORY (INHALATION) at 18:19

## 2022-01-17 RX ADMIN — IPRATROPIUM BROMIDE AND ALBUTEROL SULFATE 3 ML: .5; 3 SOLUTION RESPIRATORY (INHALATION) at 12:06

## 2022-01-17 RX ADMIN — BUDESONIDE 0.5 MG: 0.5 SUSPENSION RESPIRATORY (INHALATION) at 06:32

## 2022-01-17 RX ADMIN — GABAPENTIN 800 MG: 400 CAPSULE ORAL at 17:00

## 2022-01-17 RX ADMIN — ATORVASTATIN CALCIUM 20 MG: 20 TABLET, FILM COATED ORAL at 20:50

## 2022-01-17 RX ADMIN — INSULIN DETEMIR 40 UNITS: 100 INJECTION, SOLUTION SUBCUTANEOUS at 20:52

## 2022-01-17 RX ADMIN — HYDROCODONE BITARTRATE AND ACETAMINOPHEN 1 TABLET: 7.5; 325 TABLET ORAL at 00:44

## 2022-01-17 RX ADMIN — ENOXAPARIN SODIUM 40 MG: 40 INJECTION SUBCUTANEOUS at 21:37

## 2022-01-17 NOTE — PLAN OF CARE
Goal Outcome Evaluation:              Outcome Summary: Patient stable no complaints at this time. Slept well throughout night. Patient very pleaseant and cooperative

## 2022-01-18 LAB
ALBUMIN SERPL-MCNC: 3.9 G/DL (ref 3.5–5.2)
ALBUMIN/GLOB SERPL: 1.1 G/DL
ALP SERPL-CCNC: 62 U/L (ref 39–117)
ALT SERPL W P-5'-P-CCNC: 13 U/L (ref 1–33)
ANION GAP SERPL CALCULATED.3IONS-SCNC: 13 MMOL/L (ref 5–15)
AST SERPL-CCNC: 16 U/L (ref 1–32)
BASOPHILS # BLD AUTO: 0.02 10*3/MM3 (ref 0–0.2)
BASOPHILS NFR BLD AUTO: 0.3 % (ref 0–1.5)
BILIRUB CONJ SERPL-MCNC: <0.2 MG/DL (ref 0–0.3)
BILIRUB SERPL-MCNC: 0.2 MG/DL (ref 0–1.2)
BUN SERPL-MCNC: 40 MG/DL (ref 6–20)
BUN/CREAT SERPL: 40.8 (ref 7–25)
CALCIUM SPEC-SCNC: 9.3 MG/DL (ref 8.6–10.5)
CHLORIDE SERPL-SCNC: 97 MMOL/L (ref 98–107)
CO2 SERPL-SCNC: 25 MMOL/L (ref 22–29)
CREAT SERPL-MCNC: 0.98 MG/DL (ref 0.57–1)
CRP SERPL-MCNC: 0.81 MG/DL (ref 0–0.5)
DEPRECATED RDW RBC AUTO: 51.1 FL (ref 37–54)
EOSINOPHIL # BLD AUTO: 0.01 10*3/MM3 (ref 0–0.4)
EOSINOPHIL NFR BLD AUTO: 0.2 % (ref 0.3–6.2)
ERYTHROCYTE [DISTWIDTH] IN BLOOD BY AUTOMATED COUNT: 15.1 % (ref 12.3–15.4)
FERRITIN SERPL-MCNC: 307.1 NG/ML (ref 13–150)
GFR SERPL CREATININE-BSD FRML MDRD: 58 ML/MIN/1.73
GLOBULIN UR ELPH-MCNC: 3.4 GM/DL
GLUCOSE BLDC GLUCOMTR-MCNC: 137 MG/DL (ref 70–99)
GLUCOSE BLDC GLUCOMTR-MCNC: 277 MG/DL (ref 70–99)
GLUCOSE BLDC GLUCOMTR-MCNC: 297 MG/DL (ref 70–99)
GLUCOSE BLDC GLUCOMTR-MCNC: 354 MG/DL (ref 70–99)
GLUCOSE BLDC GLUCOMTR-MCNC: 362 MG/DL (ref 70–99)
GLUCOSE SERPL-MCNC: 368 MG/DL (ref 65–99)
HCT VFR BLD AUTO: 39.7 % (ref 34–46.6)
HGB BLD-MCNC: 13.4 G/DL (ref 12–15.9)
IMM GRANULOCYTES # BLD AUTO: 0.07 10*3/MM3 (ref 0–0.05)
IMM GRANULOCYTES NFR BLD AUTO: 1.1 % (ref 0–0.5)
LYMPHOCYTES # BLD AUTO: 2 10*3/MM3 (ref 0.7–3.1)
LYMPHOCYTES NFR BLD AUTO: 30.1 % (ref 19.6–45.3)
MCH RBC QN AUTO: 31 PG (ref 26.6–33)
MCHC RBC AUTO-ENTMCNC: 33.8 G/DL (ref 31.5–35.7)
MCV RBC AUTO: 91.9 FL (ref 79–97)
MONOCYTES # BLD AUTO: 0.88 10*3/MM3 (ref 0.1–0.9)
MONOCYTES NFR BLD AUTO: 13.2 % (ref 5–12)
NEUTROPHILS NFR BLD AUTO: 3.67 10*3/MM3 (ref 1.7–7)
NEUTROPHILS NFR BLD AUTO: 55.1 % (ref 42.7–76)
NRBC BLD AUTO-RTO: 0 /100 WBC (ref 0–0.2)
PLATELET # BLD AUTO: 227 10*3/MM3 (ref 140–450)
PMV BLD AUTO: 10.8 FL (ref 6–12)
POTASSIUM SERPL-SCNC: 4.3 MMOL/L (ref 3.5–5.2)
PROCALCITONIN SERPL-MCNC: 0.05 NG/ML (ref 0–0.25)
PROT SERPL-MCNC: 7.3 G/DL (ref 6–8.5)
RBC # BLD AUTO: 4.32 10*6/MM3 (ref 3.77–5.28)
SODIUM SERPL-SCNC: 135 MMOL/L (ref 136–145)
WBC NRBC COR # BLD: 6.65 10*3/MM3 (ref 3.4–10.8)

## 2022-01-18 PROCEDURE — 25010000002 FUROSEMIDE PER 20 MG: Performed by: INTERNAL MEDICINE

## 2022-01-18 PROCEDURE — 82962 GLUCOSE BLOOD TEST: CPT

## 2022-01-18 PROCEDURE — 82248 BILIRUBIN DIRECT: CPT | Performed by: INTERNAL MEDICINE

## 2022-01-18 PROCEDURE — 86140 C-REACTIVE PROTEIN: CPT | Performed by: INTERNAL MEDICINE

## 2022-01-18 PROCEDURE — 25010000002 DEXAMETHASONE PER 1 MG: Performed by: INTERNAL MEDICINE

## 2022-01-18 PROCEDURE — 84145 PROCALCITONIN (PCT): CPT | Performed by: INTERNAL MEDICINE

## 2022-01-18 PROCEDURE — 63710000001 INSULIN LISPRO (HUMAN) PER 5 UNITS: Performed by: INTERNAL MEDICINE

## 2022-01-18 PROCEDURE — 25010000002 ENOXAPARIN PER 10 MG: Performed by: INTERNAL MEDICINE

## 2022-01-18 PROCEDURE — 94799 UNLISTED PULMONARY SVC/PX: CPT

## 2022-01-18 PROCEDURE — 94760 N-INVAS EAR/PLS OXIMETRY 1: CPT

## 2022-01-18 PROCEDURE — 97161 PT EVAL LOW COMPLEX 20 MIN: CPT

## 2022-01-18 PROCEDURE — 85025 COMPLETE CBC W/AUTO DIFF WBC: CPT | Performed by: INTERNAL MEDICINE

## 2022-01-18 PROCEDURE — 63710000001 INSULIN DETEMIR PER 5 UNITS: Performed by: INTERNAL MEDICINE

## 2022-01-18 PROCEDURE — 25010000005 REMDESIVIR 100 MG RECONSTITUTED SOLUTION: Performed by: INTERNAL MEDICINE

## 2022-01-18 PROCEDURE — 25010000002 CEFTRIAXONE PER 250 MG: Performed by: INTERNAL MEDICINE

## 2022-01-18 PROCEDURE — 82728 ASSAY OF FERRITIN: CPT | Performed by: INTERNAL MEDICINE

## 2022-01-18 PROCEDURE — 80053 COMPREHEN METABOLIC PANEL: CPT | Performed by: INTERNAL MEDICINE

## 2022-01-18 RX ADMIN — HYDROXYZINE PAMOATE 25 MG: 25 CAPSULE ORAL at 21:55

## 2022-01-18 RX ADMIN — IPRATROPIUM BROMIDE AND ALBUTEROL SULFATE 3 ML: .5; 3 SOLUTION RESPIRATORY (INHALATION) at 06:29

## 2022-01-18 RX ADMIN — DEXAMETHASONE SODIUM PHOSPHATE 6 MG: 10 INJECTION INTRAMUSCULAR; INTRAVENOUS at 08:41

## 2022-01-18 RX ADMIN — PRASUGREL 10 MG: 10 TABLET, FILM COATED ORAL at 08:42

## 2022-01-18 RX ADMIN — IPRATROPIUM BROMIDE AND ALBUTEROL SULFATE 3 ML: .5; 3 SOLUTION RESPIRATORY (INHALATION) at 01:08

## 2022-01-18 RX ADMIN — ASPIRIN 81 MG CHEWABLE TABLET 81 MG: 81 TABLET CHEWABLE at 08:42

## 2022-01-18 RX ADMIN — METOPROLOL SUCCINATE 50 MG: 50 TABLET, EXTENDED RELEASE ORAL at 08:42

## 2022-01-18 RX ADMIN — ENOXAPARIN SODIUM 40 MG: 40 INJECTION SUBCUTANEOUS at 21:55

## 2022-01-18 RX ADMIN — GUAIFENESIN 1200 MG: 600 TABLET ORAL at 10:23

## 2022-01-18 RX ADMIN — ARFORMOTEROL TARTRATE 15 MCG: 15 SOLUTION RESPIRATORY (INHALATION) at 18:29

## 2022-01-18 RX ADMIN — FUROSEMIDE 40 MG: 10 INJECTION, SOLUTION INTRAMUSCULAR; INTRAVENOUS at 21:16

## 2022-01-18 RX ADMIN — BUDESONIDE 0.5 MG: 0.5 SUSPENSION RESPIRATORY (INHALATION) at 18:29

## 2022-01-18 RX ADMIN — INSULIN LISPRO 10 UNITS: 100 INJECTION, SOLUTION INTRAVENOUS; SUBCUTANEOUS at 12:22

## 2022-01-18 RX ADMIN — ISOSORBIDE MONONITRATE 60 MG: 30 TABLET ORAL at 08:42

## 2022-01-18 RX ADMIN — FAMOTIDINE 20 MG: 20 TABLET ORAL at 17:34

## 2022-01-18 RX ADMIN — GABAPENTIN 800 MG: 400 CAPSULE ORAL at 21:16

## 2022-01-18 RX ADMIN — ARFORMOTEROL TARTRATE 15 MCG: 15 SOLUTION RESPIRATORY (INHALATION) at 01:08

## 2022-01-18 RX ADMIN — HYDROCODONE BITARTRATE AND ACETAMINOPHEN 1 TABLET: 7.5; 325 TABLET ORAL at 08:42

## 2022-01-18 RX ADMIN — GABAPENTIN 800 MG: 400 CAPSULE ORAL at 08:42

## 2022-01-18 RX ADMIN — GABAPENTIN 800 MG: 400 CAPSULE ORAL at 16:00

## 2022-01-18 RX ADMIN — FUROSEMIDE 40 MG: 10 INJECTION, SOLUTION INTRAMUSCULAR; INTRAVENOUS at 08:41

## 2022-01-18 RX ADMIN — FAMOTIDINE 20 MG: 20 TABLET ORAL at 05:21

## 2022-01-18 RX ADMIN — BUDESONIDE 0.5 MG: 0.5 SUSPENSION RESPIRATORY (INHALATION) at 01:09

## 2022-01-18 RX ADMIN — INSULIN LISPRO 12 UNITS: 100 INJECTION, SOLUTION INTRAVENOUS; SUBCUTANEOUS at 17:34

## 2022-01-18 RX ADMIN — HYDROCODONE BITARTRATE AND ACETAMINOPHEN 1 TABLET: 7.5; 325 TABLET ORAL at 16:01

## 2022-01-18 RX ADMIN — INSULIN LISPRO 10 UNITS: 100 INJECTION, SOLUTION INTRAVENOUS; SUBCUTANEOUS at 08:42

## 2022-01-18 RX ADMIN — INSULIN DETEMIR 40 UNITS: 100 INJECTION, SOLUTION SUBCUTANEOUS at 21:58

## 2022-01-18 RX ADMIN — GUAIFENESIN 1200 MG: 600 TABLET ORAL at 21:15

## 2022-01-18 RX ADMIN — INSULIN LISPRO 10 UNITS: 100 INJECTION, SOLUTION INTRAVENOUS; SUBCUTANEOUS at 17:35

## 2022-01-18 RX ADMIN — HYDROCODONE BITARTRATE AND ACETAMINOPHEN 1 TABLET: 5; 325 TABLET ORAL at 21:39

## 2022-01-18 RX ADMIN — INSULIN LISPRO 12 UNITS: 100 INJECTION, SOLUTION INTRAVENOUS; SUBCUTANEOUS at 12:21

## 2022-01-18 RX ADMIN — CEFTRIAXONE SODIUM 1 G: 1 INJECTION, SOLUTION INTRAVENOUS at 22:16

## 2022-01-18 RX ADMIN — AZITHROMYCIN MONOHYDRATE 500 MG: 250 TABLET ORAL at 22:16

## 2022-01-18 RX ADMIN — IPRATROPIUM BROMIDE AND ALBUTEROL SULFATE 3 ML: .5; 3 SOLUTION RESPIRATORY (INHALATION) at 12:07

## 2022-01-18 RX ADMIN — INSULIN LISPRO 8 UNITS: 100 INJECTION, SOLUTION INTRAVENOUS; SUBCUTANEOUS at 08:41

## 2022-01-18 RX ADMIN — ATORVASTATIN CALCIUM 20 MG: 20 TABLET, FILM COATED ORAL at 21:17

## 2022-01-18 RX ADMIN — REMDESIVIR 100 MG: 100 INJECTION, POWDER, LYOPHILIZED, FOR SOLUTION INTRAVENOUS at 14:02

## 2022-01-18 RX ADMIN — IPRATROPIUM BROMIDE AND ALBUTEROL SULFATE 3 ML: .5; 3 SOLUTION RESPIRATORY (INHALATION) at 18:29

## 2022-01-18 NOTE — SIGNIFICANT NOTE
01/17/22 2030   Wound 06/29/21 1328 Right medial foot   Placement Date/Time: 06/29/21 1328   Present on Hospital Admission: Yes  Side: Right  Orientation: medial  Location: foot   Dressing Appearance moist drainage   Base pink; moist   Periwound pink; edematous   Wound Length (cm) 2.5 cm   Wound Width (cm) 3 cm   Wound Depth (cm) 0.5 cm   Drainage Characteristics/Odor serous   Drainage Amount small   Care, Wound irrigated with; cleansed with; sterile normal saline   Dressing Care dressing applied; dressing changed; hydrofiber; silver impregnated; silicone     Patient reports Home Health and recent [facility] evaluation, wound bed is noted to be pale pink, smaller area with 0.6cmD noted just above open wound, no expressable drainage, dry thick skin noted to toes/feet and lower legs, when asked patient said she has used all moisturizers with no improvement, C had recommended polymem foam but patient had not received and per last [facility] exam stated the aquacel ag was working well and to continue, will continue the aquacel ag here but encourage wicking into depth area just adjacent to open wound area, patient has mentioned contact casting and said her and [facility] were attempting to coordinate with facility to apply and monitor and change casting    Feet washed and dried prior to wound care     Will recommend moisturizer application after each dressing change completed

## 2022-01-18 NOTE — PLAN OF CARE
Goal Outcome Evaluation:  Plan of Care Reviewed With: patient           Outcome Summary: Patient is able to complete all transfers and ambulate independently in her room. She does not need inpatient PT services or rehab at this point in time.

## 2022-01-18 NOTE — CONSULTS
Case Management met with pt to assess discharge needs. Pt lives at home with her son and his family who are available to assist as needed. Pt is current with [facility] for wound care needs and also followed with the wound care clinic. Pt denies any additional needs at this time and plans to return home once stable.

## 2022-01-18 NOTE — PLAN OF CARE
Goal Outcome Evaluation:           Progress: no change  Outcome Summary: Patient continues to progress. Requires no oxygen. Medicated with Remdesivier. MD plans to finish course and then possible discharge depending on patients condition. Dressing change preformed. [facility] nurse consult still active in computer. Blood sugars continue to remain much more stable than Saturday 1/15.

## 2022-01-18 NOTE — PROGRESS NOTES
[facility]     Progress Note    Patient Name: Nasima Dubose  : 1965  MRN: [id number]  Primary Care Physician:  Jack Muir MD  Date of admission: 2022      Subjective   Brief summary.  Follow-up on shortness of breath and hyperglycemia      HPI:  Patient admitted with COVID-pneumonia.  Patient awake alert.  Feels better sitting in bed.  Sugars improving    Review of Systems     Complains of fatigue.  Denies chest pain or shortness of breath today.  Minimal cough    Objective     Vitals:   Temp:  [97.2 °F (36.2 °C)-98.6 °F (37 °C)] 98 °F (36.7 °C)  Heart Rate:  [53-68] 64  Resp:  [18-20] 18  BP: (126-150)/(52-68) 141/62    Physical Exam :     Elderly female, not in acute distress obese,  Heart regular .  Lungs clear but diminished breath sounds .  Abdomen soft and obese.  Extremities no edema    Result Review:  I have personally reviewed the results from the time of this admission to 2022 19:32 EST and agree with these findings:  [x]  Laboratory  []  Microbiology  []  Radiology  []  EKG/Telemetry   []  Cardiology/Vascular   []  Pathology  []  Old records  []  Other:    Blood sugar improved      Assessment / Plan       Active Hospital Problems:  Active Hospital Problems    Diagnosis    • **Acute hypoxemic respiratory failure due to COVID-19 (HCC)    • Hypoxia    • Uncontrolled diabetes mellitus (HCC)    • COPD exacerbation (HCC)        Plan:   Feeling much better.  Improving.  Continue antibiotics and remdesivir.  Monitor labs.  If remains stable discharge home on Wednesday       DVT prophylaxis:  Medical DVT prophylaxis orders are present.    CODE STATUS:   Code Status (Patient has no pulse and is not breathing): CPR (Attempt to Resuscitate)  Medical Interventions (Patient has pulse or is breathing): Full Support            Electronically signed by Gordon Syed MD, 22, 2:26 PM EST.

## 2022-01-18 NOTE — PLAN OF CARE
Goal Outcome Evaluation:              Outcome Summary: Patient's vitals have been stable throughout shift. No additional compliants. Patient states possible dc on wednesday. Shabana Jacome [facility] nurse rounded on patient. Orders were added.

## 2022-01-18 NOTE — PLAN OF CARE
Goal Outcome Evaluation:              Outcome Summary: Pt is A&O x4. Vitals have been stable. Finishing course of Remdesevir with possible dc tomorrow. Will continue to monitor.

## 2022-01-18 NOTE — THERAPY EVALUATION
Acute Care - Physical Therapy Initial Evaluation  [facility]     Patient Name: Nasima Dubose  : 1965  MRN: [id number]  Today's Date: 2022      Visit Dx:     ICD-10-CM ICD-9-CM   1. Hypoxia  R09.02 799.02   2. Multifocal pneumonia  J18.9 486   3. COVID-19  U07.1 079.89   4. Difficulty walking  R26.2 719.7     Patient Active Problem List   Diagnosis   • Acute hypoxemic respiratory failure due to COVID-19 (HCC)   • COPD exacerbation (HCC)   • Multifocal pneumonia   • Uncontrolled diabetes mellitus (HCC)   • Hypoxia     Past Medical History:   Diagnosis Date   • Anxiety     ANIXETY ATTACK AT WORK ABOUT A MONTH AGO   • Arthritis    • Condition not found     PSYCHIATRIC PROBLEMS- PT TOOK AN OVERDOSE OF MEDICATION FOR BLOOD SUGAR. TOOK 8 PILLS.   • Condition not found     HERBS USED- BC POWDER AS NEEDED   • Congestive heart failure (CHF) (HCC)    • COPD (chronic obstructive pulmonary disease) (Formerly [facility])    • Diabetes (Formerly [facility])    • Diabetic neuropathy (Formerly [facility])    • Health care home, active care coordination     [facility] (FAX: [phone])   • History of anesthesia reaction     HARD TIME WAKING UP SOMETIME   • History of hiatal hernia    • History of kidney stones    • Hyperlipidemia    • Hypertension    • Irregular heartbeat     PT IS SEEING  (RECENTLY DID A 24-HOUR HEART MONITOR).   • Peripheral artery disease (HCC)    • Stroke (Formerly [facility])     PIN STROKE FROMA MIGRAINE/ 20 YEARS AGO   • Wears glasses      Past Surgical History:   Procedure Laterality Date   • ABDOMINAL SURGERY      33 HERNIA REPAIR, GALLBLADER REMOVED   • BLADDER REPAIR     • CHOLECYSTECTOMY     • DILATATION AND CURETTAGE      X2   • HERNIA REPAIR     • HYSTERECTOMY  1992    2 CS   • TOE AMPUTATION Right 2017    RIGHT GREAT TOE   • TONSILLECTOMY       PT Assessment (last 12 hours)     PT Evaluation and Treatment     Row Name 22 1040          Physical Therapy Time and Intention    Subjective Information no complaints  -DP     Document  Type evaluation  -DP     Mode of Treatment individual therapy; physical therapy  -DP     Patient Effort good  -DP     Row Name 01/18/22 1040          General Information    Patient Profile Reviewed yes  -DP     Patient Observations alert; cooperative; agree to therapy  -DP     Prior Level of Function independent:; gait; transfer; bed mobility; ADL's  -DP     Equipment Currently Used at Home none; walker, rolling  none- in the house, RW - outside  -DP     Row Name 01/18/22 1040          Living Environment    Current Living Arrangements home/apartment/condo  -DP     Lives With alone  -DP     Row Name 01/18/22 1040          Range of Motion (ROM)    Range of Motion bilateral lower extremities; ROM is WFL  -DP     Row Name 01/18/22 1040          Strength (Manual Muscle Testing)    Strength (Manual Muscle Testing) bilateral lower extremities; strength is WFL  -DP     Row Name 01/18/22 1040          Bed Mobility    Bed Mobility supine-sit-supine  -DP     Supine-Sit-Supine [place] (Bed Mobility) modified independence  -DP     Row Name 01/18/22 1040          Transfers    Transfers sit-stand transfer; bed-chair transfer  -DP     Bed-Chair [place] (Transfers) independent  -DP     Sit-Stand [place] (Transfers) independent  -DP     Row Name 01/18/22 1040          Gait/Stairs (Locomotion)    Comment (Gait/Stairs) Patient was able to ambulate independently in her room.  -DP     Row Name             Wound 06/29/21 1328 Right medial foot    Wound - Properties Group Placement Date: 06/29/21  -MADHAVI Placement Time: 1328  -MADHAVI Present on Hospital Admission: Y  -MADHAVI Side: Right  -MADHAVI Orientation: medial  -MADHAVI Location: foot  -MADHAVI     Retired Wound - Properties Group Date first assessed: 06/29/21  -MADHAVI Time first assessed: 1328  -MADHAVI Present on Hospital Admission: Y  -MADHAVI Side: Right  -MADHAVI Location: foot  -MADHAVI     Row Name 01/18/22 1040          Plan of Care Review    Plan of Care Reviewed With patient  -DP     Outcome Summary Patient  is able to complete all transfers and ambulate independently in her room. She does not need inpatient PT services or rehab at this point in time.  -DP     Row Name 01/18/22 1040          PT Evaluation Complexity    History, PT Evaluation Complexity no personal factors and/or comorbidities  -DP     Examination of Body Systems (PT Eval Complexity) total of 4 or more elements  -DP     Clinical Presentation (PT Evaluation Complexity) stable  -DP     Clinical Decision Making (PT Evaluation Complexity) low complexity  -DP     Overall Complexity (PT Evaluation Complexity) low complexity  -DP           User Key  (r) = Recorded By, (t) = Taken By, (c) = Cosigned By    Initials Name Provider Type    Zara Hemphill, RN Registered Nurse    Matthew Mars, PT Physical Therapist                  PT Recommendation and Plan  Anticipated Discharge Disposition (PT): home  Therapy Frequency (PT): evaluation only  Plan of Care Reviewed With: patient  Outcome Summary: Patient is able to complete all transfers and ambulate independently in her room. She does not need inpatient PT services or rehab at this point in time.   Outcome Measures     Row Name 01/18/22 1100             How much help from another person do you currently need...    Turning from your back to your side while in flat bed without using bedrails? 4  -DP      Moving from lying on back to sitting on the side of a flat bed without bedrails? 4  -DP      Moving to and from a bed to a chair (including a wheelchair)? 4  -DP      Standing up from a chair using your arms (e.g., wheelchair, bedside chair)? 4  -DP      Climbing 3-5 steps with a railing? 4  -DP      To walk in hospital room? 4  -DP      AM-PAC 6 Clicks Score (PT) 24  -DP              Functional Assessment    Outcome Measure Options AM-PAC 6 Clicks Basic Mobility (PT)  -DP            User Key  (r) = Recorded By, (t) = Taken By, (c) = Cosigned By    Initials Name Provider Type    Matthew Mars, PT Physical  Therapist                 Time Calculation:    PT Charges     Row Name 01/18/22 1116             Time Calculation    PT Received On 01/18/22  -DP              Untimed Charges    PT Eval/Re-eval Minutes 40  -DP              Total Minutes    Untimed Charges Total Minutes 40  -DP       Total Minutes 40  -DP            User Key  (r) = Recorded By, (t) = Taken By, (c) = Cosigned By    Initials Name Provider Type    DP Matthew August, PT Physical Therapist              Therapy Charges for Today     Code Description Service Date Service Provider Modifiers Qty    52578588130 HC PT EVAL LOW COMPLEXITY 3 1/18/2022 Matthew August, PT GP 1          PT G-Codes  Outcome Measure Options: AM-PAC 6 Clicks Basic Mobility (PT)  AM-PAC 6 Clicks Score (PT): 24    Matthew August PT  1/18/2022

## 2022-01-19 ENCOUNTER — READMISSION MANAGEMENT (OUTPATIENT)
Dept: CALL CENTER | Facility: HOSPITAL | Age: 57
End: 2022-01-19

## 2022-01-19 VITALS
OXYGEN SATURATION: 96 % | HEIGHT: 65 IN | RESPIRATION RATE: 18 BRPM | BODY MASS INDEX: 34.45 KG/M2 | DIASTOLIC BLOOD PRESSURE: 56 MMHG | HEART RATE: 54 BPM | TEMPERATURE: 98.6 F | WEIGHT: 206.79 LBS | SYSTOLIC BLOOD PRESSURE: 137 MMHG

## 2022-01-19 PROBLEM — D89.832 CYTOKINE RELEASE SYNDROME, GRADE 2: Status: ACTIVE | Noted: 2022-01-19

## 2022-01-19 PROBLEM — R09.02 HYPOXIA: Status: RESOLVED | Noted: 2022-01-17 | Resolved: 2022-01-19

## 2022-01-19 PROBLEM — J96.01 ACUTE HYPOXEMIC RESPIRATORY FAILURE DUE TO COVID-19 (HCC): Status: RESOLVED | Noted: 2022-01-14 | Resolved: 2022-01-19

## 2022-01-19 PROBLEM — U07.1 ACUTE HYPOXEMIC RESPIRATORY FAILURE DUE TO COVID-19: Status: RESOLVED | Noted: 2022-01-14 | Resolved: 2022-01-19

## 2022-01-19 LAB
ALBUMIN SERPL-MCNC: 3.9 G/DL (ref 3.5–5.2)
ALBUMIN/GLOB SERPL: 1.2 G/DL
ALP SERPL-CCNC: 67 U/L (ref 39–117)
ALT SERPL W P-5'-P-CCNC: 16 U/L (ref 1–33)
ANION GAP SERPL CALCULATED.3IONS-SCNC: 15.3 MMOL/L (ref 5–15)
AST SERPL-CCNC: 18 U/L (ref 1–32)
BASOPHILS # BLD AUTO: 0.03 10*3/MM3 (ref 0–0.2)
BASOPHILS NFR BLD AUTO: 0.4 % (ref 0–1.5)
BILIRUB CONJ SERPL-MCNC: <0.2 MG/DL (ref 0–0.3)
BILIRUB SERPL-MCNC: 0.3 MG/DL (ref 0–1.2)
BUN SERPL-MCNC: 34 MG/DL (ref 6–20)
BUN/CREAT SERPL: 41 (ref 7–25)
CALCIUM SPEC-SCNC: 9.1 MG/DL (ref 8.6–10.5)
CHLORIDE SERPL-SCNC: 100 MMOL/L (ref 98–107)
CO2 SERPL-SCNC: 23.7 MMOL/L (ref 22–29)
CREAT SERPL-MCNC: 0.83 MG/DL (ref 0.57–1)
CRP SERPL-MCNC: 0.46 MG/DL (ref 0–0.5)
D DIMER PPP FEU-MCNC: 0.35 MG/L (FEU) (ref 0–0.59)
D-LACTATE SERPL-SCNC: 2.1 MMOL/L (ref 0.5–2)
DEPRECATED RDW RBC AUTO: 49.9 FL (ref 37–54)
EOSINOPHIL # BLD AUTO: 0.02 10*3/MM3 (ref 0–0.4)
EOSINOPHIL NFR BLD AUTO: 0.2 % (ref 0.3–6.2)
ERYTHROCYTE [DISTWIDTH] IN BLOOD BY AUTOMATED COUNT: 14.9 % (ref 12.3–15.4)
FERRITIN SERPL-MCNC: 241.9 NG/ML (ref 13–150)
GFR SERPL CREATININE-BSD FRML MDRD: 71 ML/MIN/1.73
GLOBULIN UR ELPH-MCNC: 3.2 GM/DL
GLUCOSE BLDC GLUCOMTR-MCNC: 281 MG/DL (ref 70–99)
GLUCOSE BLDC GLUCOMTR-MCNC: 322 MG/DL (ref 70–99)
GLUCOSE SERPL-MCNC: 332 MG/DL (ref 65–99)
HCT VFR BLD AUTO: 40.3 % (ref 34–46.6)
HGB BLD-MCNC: 13.5 G/DL (ref 12–15.9)
IMM GRANULOCYTES # BLD AUTO: 0.09 10*3/MM3 (ref 0–0.05)
IMM GRANULOCYTES NFR BLD AUTO: 1.1 % (ref 0–0.5)
LYMPHOCYTES # BLD AUTO: 2.39 10*3/MM3 (ref 0.7–3.1)
LYMPHOCYTES NFR BLD AUTO: 28.4 % (ref 19.6–45.3)
MCH RBC QN AUTO: 30.5 PG (ref 26.6–33)
MCHC RBC AUTO-ENTMCNC: 33.5 G/DL (ref 31.5–35.7)
MCV RBC AUTO: 91 FL (ref 79–97)
MONOCYTES # BLD AUTO: 0.94 10*3/MM3 (ref 0.1–0.9)
MONOCYTES NFR BLD AUTO: 11.2 % (ref 5–12)
NEUTROPHILS NFR BLD AUTO: 4.94 10*3/MM3 (ref 1.7–7)
NEUTROPHILS NFR BLD AUTO: 58.7 % (ref 42.7–76)
NRBC BLD AUTO-RTO: 0 /100 WBC (ref 0–0.2)
PLATELET # BLD AUTO: 247 10*3/MM3 (ref 140–450)
PMV BLD AUTO: 10.6 FL (ref 6–12)
POTASSIUM SERPL-SCNC: 4.2 MMOL/L (ref 3.5–5.2)
PROT SERPL-MCNC: 7.1 G/DL (ref 6–8.5)
RBC # BLD AUTO: 4.43 10*6/MM3 (ref 3.77–5.28)
SODIUM SERPL-SCNC: 139 MMOL/L (ref 136–145)
WBC NRBC COR # BLD: 8.41 10*3/MM3 (ref 3.4–10.8)

## 2022-01-19 PROCEDURE — 85025 COMPLETE CBC W/AUTO DIFF WBC: CPT | Performed by: INTERNAL MEDICINE

## 2022-01-19 PROCEDURE — 82962 GLUCOSE BLOOD TEST: CPT

## 2022-01-19 PROCEDURE — 25010000002 FUROSEMIDE PER 20 MG: Performed by: INTERNAL MEDICINE

## 2022-01-19 PROCEDURE — 83605 ASSAY OF LACTIC ACID: CPT | Performed by: INTERNAL MEDICINE

## 2022-01-19 PROCEDURE — 82248 BILIRUBIN DIRECT: CPT | Performed by: INTERNAL MEDICINE

## 2022-01-19 PROCEDURE — 94799 UNLISTED PULMONARY SVC/PX: CPT

## 2022-01-19 PROCEDURE — 94760 N-INVAS EAR/PLS OXIMETRY 1: CPT

## 2022-01-19 PROCEDURE — 63710000001 INSULIN LISPRO (HUMAN) PER 5 UNITS: Performed by: INTERNAL MEDICINE

## 2022-01-19 PROCEDURE — 25010000002 DEXAMETHASONE PER 1 MG: Performed by: INTERNAL MEDICINE

## 2022-01-19 PROCEDURE — 36415 COLL VENOUS BLD VENIPUNCTURE: CPT | Performed by: INTERNAL MEDICINE

## 2022-01-19 PROCEDURE — 86140 C-REACTIVE PROTEIN: CPT | Performed by: INTERNAL MEDICINE

## 2022-01-19 PROCEDURE — 85379 FIBRIN DEGRADATION QUANT: CPT | Performed by: INTERNAL MEDICINE

## 2022-01-19 PROCEDURE — 80053 COMPREHEN METABOLIC PANEL: CPT | Performed by: INTERNAL MEDICINE

## 2022-01-19 PROCEDURE — 82728 ASSAY OF FERRITIN: CPT | Performed by: INTERNAL MEDICINE

## 2022-01-19 RX ORDER — IPRATROPIUM BROMIDE AND ALBUTEROL SULFATE 2.5; .5 MG/3ML; MG/3ML
3 SOLUTION RESPIRATORY (INHALATION) EVERY 4 HOURS PRN
Status: DISCONTINUED | OUTPATIENT
Start: 2022-01-19 | End: 2022-01-19 | Stop reason: HOSPADM

## 2022-01-19 RX ORDER — BENZONATATE 200 MG/1
200 CAPSULE ORAL 3 TIMES DAILY PRN
Qty: 30 CAPSULE | Refills: 0 | Status: SHIPPED | OUTPATIENT
Start: 2022-01-19

## 2022-01-19 RX ORDER — GUAIFENESIN 600 MG/1
1200 TABLET, EXTENDED RELEASE ORAL EVERY 12 HOURS SCHEDULED
Qty: 40 TABLET | Refills: 0 | Status: SHIPPED | OUTPATIENT
Start: 2022-01-19 | End: 2022-01-29

## 2022-01-19 RX ADMIN — IPRATROPIUM BROMIDE AND ALBUTEROL SULFATE 3 ML: .5; 3 SOLUTION RESPIRATORY (INHALATION) at 01:09

## 2022-01-19 RX ADMIN — PRASUGREL 10 MG: 10 TABLET, FILM COATED ORAL at 10:29

## 2022-01-19 RX ADMIN — INSULIN LISPRO 10 UNITS: 100 INJECTION, SOLUTION INTRAVENOUS; SUBCUTANEOUS at 08:17

## 2022-01-19 RX ADMIN — GABAPENTIN 800 MG: 400 CAPSULE ORAL at 15:49

## 2022-01-19 RX ADMIN — INSULIN LISPRO 10 UNITS: 100 INJECTION, SOLUTION INTRAVENOUS; SUBCUTANEOUS at 12:41

## 2022-01-19 RX ADMIN — ISOSORBIDE MONONITRATE 60 MG: 30 TABLET ORAL at 08:16

## 2022-01-19 RX ADMIN — GABAPENTIN 800 MG: 400 CAPSULE ORAL at 08:16

## 2022-01-19 RX ADMIN — FAMOTIDINE 20 MG: 20 TABLET ORAL at 07:47

## 2022-01-19 RX ADMIN — ARFORMOTEROL TARTRATE 15 MCG: 15 SOLUTION RESPIRATORY (INHALATION) at 06:53

## 2022-01-19 RX ADMIN — BUDESONIDE 0.5 MG: 0.5 SUSPENSION RESPIRATORY (INHALATION) at 06:53

## 2022-01-19 RX ADMIN — WHITE PETROLATUM 1 APPLICATION: 1.75 OINTMENT TOPICAL at 10:30

## 2022-01-19 RX ADMIN — DEXAMETHASONE SODIUM PHOSPHATE 6 MG: 10 INJECTION INTRAMUSCULAR; INTRAVENOUS at 08:15

## 2022-01-19 RX ADMIN — IPRATROPIUM BROMIDE AND ALBUTEROL SULFATE 3 ML: .5; 3 SOLUTION RESPIRATORY (INHALATION) at 06:53

## 2022-01-19 RX ADMIN — HYDROCODONE BITARTRATE AND ACETAMINOPHEN 1 TABLET: 7.5; 325 TABLET ORAL at 15:49

## 2022-01-19 RX ADMIN — HYDROCODONE BITARTRATE AND ACETAMINOPHEN 1 TABLET: 7.5; 325 TABLET ORAL at 08:16

## 2022-01-19 RX ADMIN — ASPIRIN 81 MG CHEWABLE TABLET 81 MG: 81 TABLET CHEWABLE at 08:15

## 2022-01-19 RX ADMIN — INSULIN LISPRO 8 UNITS: 100 INJECTION, SOLUTION INTRAVENOUS; SUBCUTANEOUS at 08:14

## 2022-01-19 RX ADMIN — INSULIN LISPRO 10 UNITS: 100 INJECTION, SOLUTION INTRAVENOUS; SUBCUTANEOUS at 12:40

## 2022-01-19 RX ADMIN — METOPROLOL SUCCINATE 50 MG: 50 TABLET, EXTENDED RELEASE ORAL at 08:16

## 2022-01-19 RX ADMIN — FUROSEMIDE 40 MG: 10 INJECTION, SOLUTION INTRAMUSCULAR; INTRAVENOUS at 08:15

## 2022-01-19 RX ADMIN — GUAIFENESIN 1200 MG: 600 TABLET ORAL at 10:29

## 2022-01-19 NOTE — DISCHARGE SUMMARY
[facility]         DISCHARGE SUMMARY    Patient Name: Nasima Dubose  : 1965  MRN: [id number]    Date of Admission: 2022  Date of Discharge:   Primary Care Physician: Jack Muir MD    Consults     No orders found from 2021 to 1/15/2022.          Presenting Problem:   Hypoxia [R09.02]  Multifocal pneumonia [J18.9]  Acute hypoxemic respiratory failure due to COVID-19 (HCC) [U07.1, J96.01]  COVID-19 [U07.1]    Active and Resolved Hospital Problems:  Active Hospital Problems    Diagnosis POA   • Cytokine release syndrome, grade 2 [D89.832] No   • Uncontrolled diabetes mellitus (HCC) [E11.65] Yes   • COPD exacerbation (HCC) [J44.1] Yes      Resolved Hospital Problems    Diagnosis POA   • **Acute hypoxemic respiratory failure due to COVID-19 (HCC) [U07.1, J96.01] Yes   • Hypoxia [R09.02] Yes         Hospital Course     Hospital Course:  Nasima Dubose is a 56 y.o. female admitted for shortness of breath and hypoxia.  Patient work-up revealed multifocal pneumonia and COVID-positive.  She was started on IV remdesivir as patient was hypoxic and showing bilateral infiltrates.  Patient tolerated this very well she has significant improvement within 24 to 48 hours her oxygen requirement went down to 2 L.  In last 48 hours patient has not used oxygen her sugars were high initially due to steroids and insulin was adjusted patient showed significant improvement sugars are running in the range of 100-200 at the most.  She feels much better and wants to go home, as she has completed remdesivir and she is not requiring any oxygen and she is asymptomatic she will be discharged home with outpatient follow-up recommendations        DISCHARGE Follow Up Recommendations for labs and diagnostics:   Patient stable ready for discharge.  Advised to take medications as prescribed on discharge.  Recommended follow-up with consultants as advised.  Patient advised to return to ER if  symptoms get worse.  Patient advised to follow-up with Dr. Muir post discharge in 2 days      Day of Discharge     Vital Signs:  Temp:  [97.5 °F (36.4 °C)-98.6 °F (37 °C)] 98.6 °F (37 °C)  Heart Rate:  [52-65] 54  Resp:  [18] 18  BP: (137-176)/(53-71) 137/56    Physical Exam:    Elderly female not in acute distress, heart regular, lungs clear, abdomen soft nontender extremities Free of edema      Pertinent  and/or Most Recent Results     LAB RESULTS:      Lab 01/19/22  0634 01/18/22  0524 01/17/22  0550 01/16/22  0538 01/15/22  1338 01/15/22  1111   WBC 8.41 6.65 6.72 5.64  --  3.26*   HEMOGLOBIN 13.5 13.4 13.2 12.7  --  13.4   HEMATOCRIT 40.3 39.7 39.7 38.3  --  39.7   PLATELETS 247 227 236 201  --  190   NEUTROS ABS 4.94 3.67 3.64 2.92  --  2.01   IMMATURE GRANS (ABS) 0.09* 0.07* 0.05 0.03  --  0.03   LYMPHS ABS 2.39 2.00 2.26 1.87  --  0.88   MONOS ABS 0.94* 0.88 0.73 0.79  --  0.33   EOS ABS 0.02 0.01 0.01 0.00  --  0.00   MCV 91.0 91.9 92.8 92.7  --  93.4   CRP 0.46 0.81* 1.33* 2.73*  --  5.15*   PROCALCITONIN  --  0.05  --  0.07  --  0.08   LACTATE 2.1*  --  1.4 1.4 3.5* 3.0*         Lab 01/19/22  0634 01/18/22  0524 01/17/22  0550 01/16/22  0538 01/15/22  1111   SODIUM 139 135* 138 137 132*   POTASSIUM 4.2 4.3 4.1 4.7 4.8   CHLORIDE 100 97* 99 100 97*   CO2 23.7 25.0 25.6 21.7* 19.7*   ANION GAP 15.3* 13.0 13.4 15.3* 15.3*   BUN 34* 40* 37* 32* 22*   CREATININE 0.83 0.98 1.09* 0.98 0.83   GLUCOSE 332* 368* 236* 216* 474*   CALCIUM 9.1 9.3 9.4 9.1 8.6         Lab 01/19/22  0634 01/18/22  0524 01/17/22  0550 01/16/22  0538 01/15/22  1111   TOTAL PROTEIN 7.1 7.3 7.1 7.0 7.5   ALBUMIN 3.90 3.90 3.80 3.60 3.80   GLOBULIN 3.2 3.4 3.3 3.4 3.7   ALT (SGPT) 16 13 9 10 11   AST (SGOT) 18 16 12 24 15   BILIRUBIN 0.3 0.2 0.3 0.3 0.5   BILIRUBIN DIRECT <0.2 <0.2 <0.2 <0.2 0.2   ALK PHOS 67 62 70 65 71         Lab 01/14/22  1301   PROBNP 1,259.0*   TROPONIN T <0.010             Lab 01/19/22  0634   FERRITIN 241.90*          Brief Urine Lab Results  (Last result in the past 365 days)      Color   Clarity   Blood   Leuk Est   Nitrite   Protein   CREAT   Urine HCG        01/14/22 1833 Dark Yellow   Cloudy   Small (1+)   Moderate (2+)   Negative   >=300 mg/dL (3+)               Microbiology Results (last 10 days)     Procedure Component Value - Date/Time    Blood Culture - Blood, Arm, Right [[id number]]  (Normal) Collected: 01/15/22 2056    Lab Status: Preliminary result Specimen: Blood from Arm, Right Updated: 01/18/22 2115     Blood Culture No growth at 3 days    Blood Culture - Blood, Arm, Left [[id number]]  (Normal) Collected: 01/15/22 2056    Lab Status: Preliminary result Specimen: Blood from Arm, Left Updated: 01/18/22 2115     Blood Culture No growth at 3 days    Respiratory Culture - Sputum, Cough [[id number]] Collected: 01/15/22 0141    Lab Status: Final result Specimen: Sputum from Cough Updated: 01/17/22 1210     Respiratory Culture Moderate growth (3+) Normal respiratory nicola. No S. aureus or Pseudomonas aeruginosa detected. Final report.     Gram Stain Greater than 20 WBCs per low power field      Less than 10 Epithelial cells per low power field      Rare (1+) Gram positive cocci in pairs, chains and clusters      Occasional Gram positive bacilli    Respiratory Panel PCR w/COVID-19(SARS-CoV-2) TALI/HOLLY/JUAN F/PAD/COR/MAD/MIGUELINA In-House, NP Swab in UTM/VTM, 3-4 HR TAT - Swab, Nasopharynx [[id number]]  (Abnormal) Collected: 01/15/22 0050    Lab Status: Final result Specimen: Swab from Nasopharynx Updated: 01/15/22 1747     ADENOVIRUS, PCR Not Detected     Coronavirus 229E Not Detected     Coronavirus HKU1 Not Detected     Coronavirus NL63 Not Detected     Coronavirus OC43 Not Detected     COVID19 Detected     Human Metapneumovirus Not Detected     Human Rhinovirus/Enterovirus Not Detected     Influenza A PCR Not Detected     Influenza B PCR Not Detected     Parainfluenza Virus 1 Not Detected     Parainfluenza Virus 2 Not Detected      Parainfluenza Virus 3 Not Detected     Parainfluenza Virus 4 Not Detected     RSV, PCR Not Detected     Bordetella pertussis pcr Not Detected     Bordetella parapertussis PCR Not Detected     Chlamydophila pneumoniae PCR Not Detected     Mycoplasma pneumo by PCR Not Detected    Narrative:      In the setting of a positive respiratory panel with a viral infection PLUS a negative procalcitonin without other underlying concern for bacterial infection, consider observing off antibiotics or discontinuation of antibiotics and continue supportive care. If the respiratory panel is positive for atypical bacterial infection (Bordetella pertussis, Chlamydophila pneumoniae, or Mycoplasma pneumoniae), consider antibiotic de-escalation to target atypical bacterial infection.    Influenza Antigen, Rapid - Swab, Nasopharynx [[id number]]  (Normal) Collected: 01/15/22 0050    Lab Status: Final result Specimen: Swab from Nasopharynx Updated: 01/15/22 0225     Influenza A Ag, EIA Negative     Influenza B Ag, EIA Negative    Urine Culture - Urine, Urine, Clean Catch [[id number]] Collected: 01/14/22 1833    Lab Status: Final result Specimen: Urine, Clean Catch Updated: 01/15/22 2131     Urine Culture 50,000 CFU/mL Mixed Nicola Isolated    Narrative:      Specimen contains mixed organisms of questionable pathogenicity which indicates contamination with commensal nicola.  Further identification is unlikely to provide clinically useful information.  Suggest recollection.    Legionella Antigen, Urine - Urine, Urine, Clean Catch [[id number]]  (Normal) Collected: 01/14/22 1833    Lab Status: Final result Specimen: Urine, Clean Catch Updated: 01/15/22 1029     LEGIONELLA ANTIGEN, URINE Negative    S. Pneumo Ag Urine or CSF - Urine, Urine, Clean Catch [[id number]]  (Normal) Collected: 01/14/22 1833    Lab Status: Final result Specimen: Urine, Clean Catch Updated: 01/15/22 1029     Strep Pneumo Ag Negative          XR Chest 2 View    Result Date:  1/7/2022  Impression:   1. Chronic changes are noted.  There is no definitive evidence for acute cardiopulmonary process.      VIPUL BRADFORD MD       Electronically Signed and Approved By: VIPUL BRADFORD MD on 1/07/2022 at 21:12             CT Chest Without Contrast Diagnostic    Result Date: 1/14/2022  Impression:   1. Findings consistent with multifocal pneumonia, likely related to acute viral illness given predominately subpleural ground-glass changes. 2. Mildly prominent hilar and mediastinal nodes, likely reactive. 3. Extensive atherosclerosis. 4. Ancillary findings as described above..    IFRAH BERMUDEZ MD       Electronically Signed and Approved By: IFRAH BERMUDEZ MD on 1/14/2022 at 18:18             XR Chest 1 View    Result Date: 1/14/2022  Impression:   Mild patchy airspace changes present within the subpleural left upper lobe and the left lung base, likely related to pneumonia.  There is a mild underlying pulmonary edema pattern.       IFRAH BERMUDEZ MD       Electronically Signed and Approved By: IFRAH BERMUDEZ MD on 1/14/2022 at 16:53                           Labs Pending at Discharge:  Pending Labs     Order Current Status    Blood Culture - Blood, Arm, Left Preliminary result    Blood Culture - Blood, Arm, Right Preliminary result            Discharge Details        Discharge Medications      New Medications      Instructions Start Date   benzonatate 200 MG capsule  Commonly known as: TESSALON   200 mg, Oral, 3 Times Daily PRN      guaiFENesin 600 MG 12 hr tablet  Commonly known as: MUCINEX   1,200 mg, Oral, Every 12 Hours Scheduled         Continue These Medications      Instructions Start Date   Aspirin 81 MG capsule   81 mg, Oral, Every 24 Hours      furosemide 40 MG tablet  Commonly known as: LASIX   40 mg, Oral, 2 Times Daily      gabapentin 800 MG tablet  Commonly known as: NEURONTIN   800 mg, Oral, 3 Times Daily      HYDROcodone-acetaminophen 7.5-325 MG per tablet  Commonly known as: NORCO   1  tablet, Oral, Every 8 Hours      Insulin Degludec 200 UNIT/ML solution pen-injector pen injection  Commonly known as: TRESIBA FLEXTOUCH   66 Units, Subcutaneous, Nightly      isosorbide mononitrate 60 MG 24 hr tablet  Commonly known as: IMDUR   60 mg, Oral, 2 Times Daily      Linzess 145 MCG capsule capsule  Generic drug: linaclotide   145 mcg, Oral, Every Morning Before Breakfast      loratadine 10 MG tablet  Commonly known as: CLARITIN   10 mg, Oral, Daily      metFORMIN 1000 MG tablet  Commonly known as: GLUCOPHAGE   1,000 mg, Oral, 2 times daily      metoprolol tartrate 50 MG tablet  Commonly known as: LOPRESSOR   50 mg, Oral, Daily      NovoLOG FlexPen 100 UNIT/ML solution pen-injector sc pen  Generic drug: insulin aspart   10 Units, Subcutaneous, 3 Times Daily With Meals      prasugrel 10 MG tablet  Commonly known as: EFFIENT   10 mg, Oral, Daily      ProAir  (90 Base) MCG/ACT inhaler  Generic drug: albuterol sulfate HFA   2 puffs, Inhalation, Every 4 Hours PRN      simvastatin 40 MG tablet  Commonly known as: ZOCOR   40 mg, Oral, Nightly      Vitamin C 500 MG capsule   500 mg, Oral, Daily             Allergies   Allergen Reactions   • Aminoglycosides Unknown - Low Severity   • Neosporin [Neomycin-Bacitracin Zn-Polymyx] Itching and Rash   • Polymyxin B Unknown - Low Severity   • Pramoxine Unknown - Low Severity         Discharge Disposition:    Home or Self Care    Diet:  1800 ADA heart healthy      Discharge Activity:     Activity Instructions     Activity as Tolerated              Future Appointments   Date Time Provider Department Center   1/27/2022  9:00 AM ROOM 1 N[facility]SE [facility]  [facility]       Additional Instructions for the Follow-ups that You Need to Schedule     Discharge Follow-up with PCP   As directed       Currently Documented PCP:    Jack Muir MD    PCP Phone Number:    [phone]     Follow Up Details: in 2-3 days               Time spent on Discharge including face to  face service: 27 minutes.            I have dictated this note utilizing Dragon Dictation.             Please note that portions of this note were completed with a voice recognition program.             Part of this note may be an electronic transcription/translation of spoken language to printed text         using the Dragon Dictation System.       Electronically signed by Gordon Syed MD, 01/19/22, 2:03 PM EST.

## 2022-01-19 NOTE — PROGRESS NOTES
[facility]     Progress Note    Patient Name: Nasima Dubose  : 1965  MRN: [id number]  Primary Care Physician:  Jack Muir MD  Date of admission: 2022      Subjective   Brief summary.  Follow-up on shortness of breath and hyperglycemia      HPI:  Patient admitted with COVID-pneumonia.  Continues to feel good.  Plans to go home soon.  No fever.  Sugars better    Review of Systems     Complains of fatigue.  Denies chest pain or shortness of breath today.  No cough.  Wound on the foot healing    Objective     Vitals:   Temp:  [97.2 °F (36.2 °C)-98.4 °F (36.9 °C)] 98.4 °F (36.9 °C)  Heart Rate:  [57-65] 65  Resp:  [18] 18  BP: (146-167)/(49-86) 146/63    Physical Exam :     Elderly female, not in acute distress obese,  Heart regular .  Lungs clear but diminished breath sounds .  Abdomen soft and obese.  Right foot wound healing  Extremities no significant edema of the legs    Result Review:  I have personally reviewed the results from the time of this admission to 2022 19:50 EST and agree with these findings:  [x]  Laboratory  []  Microbiology  []  Radiology  []  EKG/Telemetry   []  Cardiology/Vascular   []  Pathology  []  Old records  []  Other:    Blood sugar improved      Assessment / Plan       Active Hospital Problems:  Active Hospital Problems    Diagnosis    • **Acute hypoxemic respiratory failure due to COVID-19 (HCC)    • Hypoxia    • Uncontrolled diabetes mellitus (HCC)    • COPD exacerbation (HCC)        Plan:   Feeling much better.   tolerating medications.  Labs stable.    If remains stable discharge home tomorrow    DVT prophylaxis:  Medical DVT prophylaxis orders are present.    CODE STATUS:   Code Status (Patient has no pulse and is not breathing): CPR (Attempt to Resuscitate)  Medical Interventions (Patient has pulse or is breathing): Full Support            Electronically signed by Gordon Syed MD, 22, 2:26 PM EST.

## 2022-01-19 NOTE — SIGNIFICANT NOTE
01/19/22 0752   Provider Notification   Reason for Communication Critical lab value   Provider Name timoteo ritter   Notification Route Phone call   Response No new orders     0727 lactic of 2.1 called  0752 dr ritter was notified. No new orders

## 2022-01-20 ENCOUNTER — READMISSION MANAGEMENT (OUTPATIENT)
Dept: CALL CENTER | Facility: HOSPITAL | Age: 57
End: 2022-01-20

## 2022-01-20 LAB
BACTERIA SPEC AEROBE CULT: NORMAL
BACTERIA SPEC AEROBE CULT: NORMAL

## 2022-01-20 NOTE — OUTREACH NOTE
COVID-19 Week 1 Survey      Responses   [facility] patient discharged from? [facility]   Does the patient have one of the following disease processes/diagnoses(primary or secondary)? COVID-19   COVID-19 underlying condition? None   Call Number Call 1   Week 1 Call successful? No  [ATTEMPTED PATIENT'S AND DAUGHTER'S NUMBERS WITH NO ANSWER AT EITHER]   Discharge diagnosis Acute hypoxemic respiratory failure due to COVID-19           Becca Sherman LPN

## 2022-01-20 NOTE — OUTREACH NOTE
Prep Survey      Responses   [facility] facility patient discharged from? [facility]   Is LACE score < 7 ? No   Emergency Room discharge w/ pulse ox? No   Eligibility Readm Mgmt   Discharge diagnosis Acute hypoxemic respiratory failure due to COVID-19    Does the patient have one of the following disease processes/diagnoses(primary or secondary)? COVID-19   Does the patient have Home health ordered? Yes   What is the Home health agency?  [facility]    Is there a DME ordered? No   Prep survey completed? Yes          Makenzie Flores RN

## 2022-01-21 ENCOUNTER — READMISSION MANAGEMENT (OUTPATIENT)
Dept: CALL CENTER | Facility: HOSPITAL | Age: 57
End: 2022-01-21

## 2022-01-21 NOTE — OUTREACH NOTE
COVID-19 Week 1 Survey      Responses   [facility] patient discharged from? [facility]   Does the patient have one of the following disease processes/diagnoses(primary or secondary)? COVID-19   COVID-19 underlying condition? None   Call Number Call 2   Week 1 Call successful? Yes   Call start time 1217   Call end time 1222   Discharge diagnosis Acute hypoxemic respiratory failure due to COVID-19    Meds reviewed with patient/caregiver? Yes   Is the patient having any side effects they believe may be caused by any medication additions or changes? No   Does the patient have all medications ordered at discharge? No   What is keeping the patient from filling the prescriptions? Transportation  [Will have family ]   Nursing Interventions No intervention needed   Is the patient taking all medications as directed (includes completed medication regime)? Yes   Does the patient have a primary care provider?  Yes   Comments regarding PCP PCP appt. is next week   Does the patient have an appointment with their PCP or specialist within 7 days of discharge? Yes   Has the patient kept scheduled appointments due by today? N/A   What is the Home health agency?  [facility]    Has home health visited the patient within 72 hours of discharge? Yes   Psychosocial issues? No   Did the patient receive a copy of their discharge instructions? Yes   Did the patient receive a copy of COVID-19 specific instructions? Yes   Nursing interventions Reviewed instructions with patient   What is the patient's perception of their health status since discharge? Improving   Does the patient have any of the following symptoms? Loss of taste/smell,  Cough   Nursing Interventions Nurse provided patient education   Pulse Ox monitoring None   Is the patient/caregiver able to teach back steps to recovery at home? Set small, achievable goals for return to baseline health,  Rest and rebuild strength, gradually increase activity,  Eat a  well-balance diet   If the patient is a current smoker, are they able to teach back resources for cessation? Not a smoker   Is the patient/caregiver able to teach back the hierarchy of who to call/visit for symptoms/problems? PCP, Specialist, Home health nurse, Urgent Care, ED, 911 Yes   COVID-19 call completed? Yes   Wrap up additional comments Patient reports she is doing better, family will  mucinex and benzonatate. Instructed on use of IS          Mickie Marmolejo RN

## 2022-01-22 ENCOUNTER — READMISSION MANAGEMENT (OUTPATIENT)
Dept: CALL CENTER | Facility: HOSPITAL | Age: 57
End: 2022-01-22

## 2022-01-22 NOTE — OUTREACH NOTE
COVID-19 Week 1 Survey      Responses   [facility] patient discharged from? [facility]   Does the patient have one of the following disease processes/diagnoses(primary or secondary)? COVID-19   COVID-19 underlying condition? None   Call Number Call 3   Week 1 Call successful? Yes  [She was asleep, ask to call back another day, She is very tired today. ]   Call start time 0912   Call end time 0912   Discharge diagnosis Acute hypoxemic respiratory failure due to COVID-19    Is patient permission given to speak with other caregiver? No          Angela Damico RN

## 2022-01-26 ENCOUNTER — READMISSION MANAGEMENT (OUTPATIENT)
Dept: CALL CENTER | Facility: HOSPITAL | Age: 57
End: 2022-01-26

## 2022-01-26 NOTE — OUTREACH NOTE
COVID-19 Week 2 Survey      Responses   [facility] patient discharged from? [facility]   Does the patient have one of the following disease processes/diagnoses(primary or secondary)? COVID-19   COVID-19 underlying condition? None   Call Number Call 1   COVID-19 Week 2: Call 1 attempt successful? No   Discharge diagnosis Acute hypoxemic respiratory failure due to COVID-19           Nicole Snyder RN  
never

## 2022-02-22 ENCOUNTER — OFFICE VISIT (OUTPATIENT)
Dept: WOUND CARE | Facility: HOSPITAL | Age: 57
End: 2022-02-22

## 2022-02-22 VITALS
DIASTOLIC BLOOD PRESSURE: 70 MMHG | SYSTOLIC BLOOD PRESSURE: 140 MMHG | HEART RATE: 73 BPM | RESPIRATION RATE: 18 BRPM | OXYGEN SATURATION: 93 % | TEMPERATURE: 96 F

## 2022-02-22 DIAGNOSIS — E11.42 DIABETIC POLYNEUROPATHY ASSOCIATED WITH TYPE 2 DIABETES MELLITUS: ICD-10-CM

## 2022-02-22 DIAGNOSIS — L97.511 DIABETIC ULCER OF OTHER PART OF RIGHT FOOT ASSOCIATED WITH TYPE 2 DIABETES MELLITUS, LIMITED TO BREAKDOWN OF SKIN: Primary | ICD-10-CM

## 2022-02-22 DIAGNOSIS — E11.621 DIABETIC ULCER OF OTHER PART OF RIGHT FOOT ASSOCIATED WITH TYPE 2 DIABETES MELLITUS, LIMITED TO BREAKDOWN OF SKIN: Primary | ICD-10-CM

## 2022-02-22 PROCEDURE — 11042 DBRDMT SUBQ TIS 1ST 20SQCM/<: CPT | Performed by: EMERGENCY MEDICINE

## 2022-02-22 RX ORDER — PEN NEEDLE, DIABETIC 31 GX5/16"
NEEDLE, DISPOSABLE MISCELLANEOUS
COMMUNITY
Start: 2022-01-21

## 2022-02-22 NOTE — PROGRESS NOTES
Chief Complaint  Wound Check (RIGHT FOOT ULCER; DM, )    Subjective        History of Present Illness    Is a 56-year-old female who has been followed by the wound care clinic for a RLE diabetic foot ulcer.  She has been applying Aquacel Ag to this area.  Severity of the wound has somewhat waxed and waned. Her diabetes is reasonably well controlled with her most recent hemoglobin A1c in April being 6.5.  She also has a history of peripheral vascular disease and has been seen by Dr. Henson in the past.    Since her last visit in early January she was admitted to the hospital from 01/14 through 01/19/2022 for multifocal pneumonia due to COVID-19.    She has been using Aquacel Ag to her wound most recently.    Allergies:  Aminoglycosides, Neosporin [neomycin-bacitracin zn-polymyx], Polymyxin b, and Pramoxine      Current Outpatient Medications:   •  albuterol sulfate HFA (ProAir HFA) 108 (90 Base) MCG/ACT inhaler, Inhale 2 puffs Every 4 (Four) Hours As Needed., Disp: , Rfl:   •  Ascorbic Acid (Vitamin C) 500 MG capsule, Take 500 mg by mouth Daily., Disp: , Rfl:   •  Aspirin 81 MG capsule, Take 81 mg by mouth Daily., Disp: , Rfl:   •  B-D ULTRAFINE III SHORT PEN 31G X 8 MM misc, USE AS DIRECTED INTRAMUCULARLY FOUR TIMES DAILY FOR 90 DAYS, Disp: , Rfl:   •  benzonatate (TESSALON) 200 MG capsule, Take 1 capsule by mouth 3 (Three) Times a Day As Needed for Cough for up to 30 doses., Disp: 30 capsule, Rfl: 0  •  furosemide (LASIX) 40 MG tablet, Take 40 mg by mouth 2 (Two) Times a Day., Disp: , Rfl:   •  gabapentin (NEURONTIN) 800 MG tablet, Take 800 mg by mouth 3 (Three) Times a Day., Disp: , Rfl:   •  HYDROcodone-acetaminophen (NORCO) 7.5-325 MG per tablet, Take 1 tablet by mouth Every 8 (Eight) Hours., Disp: , Rfl:   •  insulin aspart (NovoLOG FlexPen) 100 UNIT/ML solution pen-injector sc pen, Inject 10 Units under the skin into the appropriate area as directed 3 (Three) Times a Day With Meals., Disp: , Rfl:   •   Insulin Degludec (TRESIBA FLEXTOUCH) 200 UNIT/ML solution pen-injector pen injection, Inject 66 Units under the skin into the appropriate area as directed Every Night., Disp: , Rfl:   •  isosorbide mononitrate (IMDUR) 60 MG 24 hr tablet, Take 60 mg by mouth 2 (Two) Times a Day., Disp: , Rfl:   •  linaclotide (Linzess) 145 MCG capsule capsule, Take 145 mcg by mouth Every Morning Before Breakfast., Disp: , Rfl:   •  loratadine (CLARITIN) 10 MG tablet, Take 10 mg by mouth Daily., Disp: , Rfl:   •  metFORMIN (GLUCOPHAGE) 1000 MG tablet, Take 1,000 mg by mouth 2 (two) times a day., Disp: , Rfl:   •  metoprolol tartrate (LOPRESSOR) 50 MG tablet, Take 50 mg by mouth Daily., Disp: , Rfl:   •  prasugrel (EFFIENT) 10 MG tablet, Take 10 mg by mouth Daily., Disp: , Rfl:   •  simvastatin (ZOCOR) 40 MG tablet, Take 40 mg by mouth Every Night., Disp: , Rfl:     Past Medical History:   Diagnosis Date   • Anxiety     ANIXETY ATTACK AT WORK ABOUT A MONTH AGO   • Arthritis    • Condition not found     PSYCHIATRIC PROBLEMS- PT TOOK AN OVERDOSE OF MEDICATION FOR BLOOD SUGAR. TOOK 8 PILLS.   • Condition not found     HERBS USED- BC POWDER AS NEEDED   • Congestive heart failure (CHF) (HCC)    • COPD (chronic obstructive pulmonary disease) (HCC)    • Diabetes (HCC)    • Diabetic neuropathy (HCC)    • Health care home, active care coordination     [facility] (FAX: [phone])   • History of anesthesia reaction     HARD TIME WAKING UP SOMETIME   • History of hiatal hernia    • History of kidney stones    • Hyperlipidemia    • Hypertension    • Irregular heartbeat     PT IS SEEING  (RECENTLY DID A 24-HOUR HEART MONITOR).   • Peripheral artery disease (HCC)    • Stroke (HCC)     PIN STROKE FROMA MIGRAINE/ 20 YEARS AGO   • Wears glasses      Past Surgical History:   Procedure Laterality Date   • ABDOMINAL SURGERY      33 HERNIA REPAIR, GALLBLADER REMOVED   • BLADDER REPAIR     • CHOLECYSTECTOMY     • DILATATION AND CURETTAGE      X2    • HERNIA REPAIR     • HYSTERECTOMY  1992    2 CS   • TOE AMPUTATION Right 02/14/2017    RIGHT GREAT TOE   • TONSILLECTOMY       Social History     Socioeconomic History   • Marital status:    Tobacco Use   • Smoking status: Former Smoker   • Smokeless tobacco: Never Used   • Tobacco comment: QUIT 4 YEARS AGO/ RECENLTY STARTED AGAIN 3 CIGS A DAY    Substance and Sexual Activity   • Alcohol use: Not Currently   • Drug use: Not Currently     Family History   Problem Relation Age of Onset   • Diabetes Mother    • Anesthesia problems Mother         HARD TIME WAKING UP SOMETIMES          Objective     Vitals:    02/22/22 1103   BP: 140/70   BP Location: Left arm   Patient Position: Sitting   Pulse: 73   Resp: 18   Temp: 96 °F (35.6 °C)   TempSrc: Temporal   SpO2: 93%   PainSc: 0-No pain     There is no height or weight on file to calculate BMI.    STEADI Fall Risk Assessment has not been completed.     Review of Systems     ROS:  No fevers, chills, sweats, or body aches. No shortness of breath.     I have reviewed the HPI and ROS as documented by MA/RN. Shamika Whitehead MD    Physical Exam     NAD, well dressed, well nourished  Normocephalic, atraumatic  EOMI, no scleral icterus  No respiratory distress, no cough  AAOx3, pleasant, cooperative  Ulceration right medial plantar foot is improved from last visit.  There is a skin bridge  it into 2 smaller wounds.  Mild to moderate surrounding callus.  No erythema, induration, fluctuance.    See photo for details.                Result Review :  The following data was reviewed by: Shamika Whitehead MD on 02/22/2022:    Wound Avatar Documentation     Active Wound LDAs        Wound 06/29/21 1328 Right medial foot    Placement date 06/29/21   -MADHAVI 06/29/21 1328      Placement time 1328   - 06/29/21 1328 Days 237    Present on Hospital Admission: Y   - 06/29/21 1328 Side: Right   - 06/29/21 1328    Orientation: medial   - 06/29/21 1328 Location: foot   -  06/29/21 1328    Assessments       02/22/22 1105 01/19/22 1513 01/19/22 0815 01/17/22 2200 01/17/22 2030           Wound Image  View All Images View Images   - 02/22/22 1106    View Images   - 01/19/22 1532    --  --  --    Dressing Appearance moist drainage; intact   - 02/22/22 1106 --  dry; intact   - 01/19/22 1136 dry; intact   - 01/17/22 2211 moist drainage   -MB 01/17/22 2129   Closure --  --  --  --  --    Base --  --  dressing in place, unable to visualize   - 01/19/22 1136 --  pink; moist   -MB 01/17/22 2129   Black (%), Wound Tissue Color --  --  --  --  --    Red (%), Wound Tissue Color 100   - 02/22/22 1106 --  --  --  --    Yellow (%), Wound Tissue Color --  --  --  --  --    Periwound intact   - 02/22/22 1106 --  --  --  pink; edematous   -MB 01/17/22 2129   Periwound Temperature warm   - 02/22/22 1106 --  --  --  --    Periwound Skin Turgor firm   - 02/22/22 1106 --  --  --  --    Edges callused   - 02/22/22 1106 --  --  --  --    Wound Length (cm) 2.5 cm   - 02/22/22 1106 --  --  --  2.5 cm   -MB 01/17/22 2129   Wound Width (cm) 1.8 cm   - 02/22/22 1106 --  --  --  3 cm   -MB 01/17/22 2129   Wound Depth (cm) 0.4 cm   - 02/22/22 1106 --  --  --  0.5 cm   -MB 01/17/22 2129   Tunneling [Depth (cm)/Location] --  --  --  --  --    Undermining [Depth (cm)/Location] --  --  --  --  --    Drainage Characteristics/Odor serosanguineous   - 02/22/22 1106 --  --  --  serous   -MB 01/17/22 2129   Drainage Amount moderate   - 02/22/22 1106 --  --  --  small   -MB 01/17/22 2129   Care, Wound cleansed with; sterile normal saline   - 02/22/22 1106 --  --  --  irrigated with; cleansed with; sterile normal saline   -MB 01/17/22 2129   Dressing Care --  --  --  --  dressing applied; dressing changed; hydrofiber; silver impregnated; silicone   -MB 01/17/22 2129   Periwound Care --  --  --  --  --    Adhesive Closure Strips --  --  --  --  --    Number of Adhesive Closure Strips --  --  --   --  --    Sutures Removed Intact --  --  --  --  --    Number of Sutures Removed --  --  --  --  --    Staples Removed Intact --  --  --  --  --    Number of Staples Removed --  --  --  --  --    Wound Output (mL) --  --  --  --  --          01/17/22 1900 01/16/22 0822 01/16/22 0100 01/15/22 2100 01/15/22 0815   Wound Image  View All Images --  --  --  --  --    Dressing Appearance dry; intact   - 01/17/22 1930 intact; dry   - 01/16/22 1310 moist drainage   - 01/16/22 0819 intact   - 01/15/22 2348 intact   - 01/15/22 1549   Closure Adhesive bandage   - 01/17/22 1930 --  --  --  --    Base yellow; moist   - 01/17/22 1930 --  moist; pink   - 01/16/22 0819 --  --    Black (%), Wound Tissue Color --  --  --  --  --    Red (%), Wound Tissue Color --  --  --  --  --    Yellow (%), Wound Tissue Color --  --  --  --  --    Periwound warm; moist   - 01/17/22 1930 --  pink; warm; redness   - 01/16/22 0819 --  --    Periwound Temperature warm   - 01/17/22 1930 --  warm   - 01/16/22 0819 --  --    Periwound Skin Turgor firm   - 01/17/22 1930 --  firm   - 01/16/22 0819 --  --    Edges open   - 01/17/22 1930 --  callused   - 01/16/22 0819 --  --    Wound Length (cm) --  --  3 cm   - 01/16/22 0819 --  --    Wound Width (cm) --  --  2 cm   - 01/16/22 0819 --  --    Wound Depth (cm) --  --  0.3 cm   - 01/16/22 0819 --  --    Tunneling [Depth (cm)/Location] --  --  --  --  --    Undermining [Depth (cm)/Location] --  --  --  --  --    Drainage Characteristics/Odor yellow   - 01/17/22 1930 --  --  --  --    Drainage Amount scant   - 01/17/22 1930 --  small   - 01/16/22 0819 --  --    Care, Wound cleansed with; sterile normal saline; moist wound environment maintained; silver agent applied   - 01/17/22 1930 --  cleansed with; sterile normal saline   - 01/16/22 0819 --  --    Dressing Care dressing changed; other (see comments)  mepilex   - 01/17/22 1930 --  dressing [facility]  [facility] with saline and guaze applied and roll guaze.   - 01/16/22 0819 --  --    Periwound Care --  --  barrier film applied   - 01/16/22 0819 --  --    Adhesive Closure Strips --  --  --  --  --    Number of Adhesive Closure Strips --  --  --  --  --    Sutures Removed Intact --  --  --  --  --    Number of Sutures Removed --  --  --  --  --    Staples Removed Intact --  --  --  --  --    Number of Staples Removed --  --  --  --  --    Wound Output (mL) --  --  --  --  --          01/15/22 0355 01/06/22 0937 01/06/22 0839 11/29/21 1120 11/29/21 1041           Wound Image  View All Images View Images   - 01/15/22 0400    View Images   - 01/06/22 0938    View Images   - 01/06/22 0841    View Images   - 11/29/21 1121    View Images   - 11/29/21 1045      Dressing Appearance dry; intact   - 01/15/22 0400 other (see comments)  post debridement   - 01/06/22 0938 intact; moist drainage   - 01/06/22 0841 other (see comments)  POST DEBRIDEMENT   - 11/29/21 1121 moist drainage; intact   - 11/29/21 1045   Closure --  --  --  --  --    Base --  --  --  --  --    Black (%), Wound Tissue Color --  0   - 01/06/22 0938 0   - 01/06/22 0841 0   - 11/29/21 1121 0   - 11/29/21 1045   Red (%), Wound Tissue Color --  100   - 01/06/22 0938 100   - 01/06/22 0841 100   - 11/29/21 1121 100   - 11/29/21 1045   Yellow (%), Wound Tissue Color --  0   - 01/06/22 0938 0   - 01/06/22 0841 0   - 11/29/21 1121 0   - 11/29/21 1045   Periwound pink   - 01/15/22 0400 intact   -MADHAVI 01/06/22 0938 intact   -MADHAVI 01/06/22 0841 intact   -MADHAVI 11/29/21 1121 macerated   -MADHAVI 11/29/21 1045   Periwound Temperature warm   -NENITA 01/15/22 0400 warm   -MADHAVI 01/06/22 0938 warm   -MADHAVI 01/06/22 0841 warm   -MADHAVI 11/29/21 1121 warm   -MADHAVI 11/29/21 1045   Periwound Skin Turgor firm   -NENITA 01/15/22 0400 soft   -MADHAVI 01/06/22 0938 soft   -MADHAVI 01/06/22 0841 soft   -MADHAVI 11/29/21 1121 soft   -MADHAVI 11/29/21 1045   Edges callused   -NENITA  01/15/22 0400 open   - 01/06/22 0938 callused; open   - 01/06/22 0841 open   - 11/29/21 1121 open   - 11/29/21 1045   Wound Length (cm) --  3 cm   - 01/06/22 0938 3 cm   - 01/06/22 0841 3.3 cm   - 11/29/21 1121 2.7 cm   - 11/29/21 1045   Wound Width (cm) --  2.1 cm   - 01/06/22 0938 2.1 cm   - 01/06/22 0841 1.7 cm   - 11/29/21 1121 1.6 cm   - 11/29/21 1045   Wound Depth (cm) --  0.3 cm   - 01/06/22 0938 0.5 cm   - 01/06/22 0939 0.9 cm   - 11/29/21 1121 1 cm   - 11/29/21 1045   Tunneling [Depth (cm)/Location] --  0   - 01/06/22 0938 0   - 01/06/22 0841 0   - 11/29/21 1121 0   - 11/29/21 1045   Undermining [Depth (cm)/Location] --  0   - 01/06/22 0938 0   - 01/06/22 0841 0   - 11/29/21 1121 1.1 from 1 to 5 O'CLOCK (1.8 at 3:00)   - 11/29/21 1045   Drainage Characteristics/Odor other (see comments)   - 01/15/22 0400 bleeding controlled; other (see comments)  post debridement   - 01/06/22 0938 serous; serosanguineous   - 01/06/22 0841 other (see comments)  POST DEBRIDEMENT   - 11/29/21 1121 serous; serosanguineous; malodorous   - 11/29/21 1045   Drainage Amount none   - 01/15/22 0400 --  moderate   - 01/06/22 0841 --  moderate   - 11/29/21 1045   Care, Wound cleansed with; sterile normal saline   - 01/15/22 0400 cleansed with; sterile normal saline   - 01/06/22 0938 cleansed with; sterile normal saline   - 01/06/22 0841 cleansed with; sterile normal saline   - 11/29/21 1121 cleansed with; sterile normal saline   - 11/29/21 1045   Dressing Care dressing changed  aquacel AG   - 01/15/22 0400 dressing applied; other (see comments)  aquacel ag, mepilex   - 01/06/22 0938 --  dressing applied; other (see comments)  POLYMEM SILVER, MEPILEX   - 11/29/21 1121 --    Periwound Care barrier ointment applied   - 01/15/22 0400 dry periwound area maintained   - 01/06/22 0938 --  dry periwound area maintained   - 11/29/21 1121 --    Adhesive Closure  Strips --  --  --  --  --    Number of Adhesive Closure Strips --  --  --  --  --    Sutures Removed Intact --  --  --  --  --    Number of Sutures Removed --  --  --  --  --    Staples Removed Intact --  --  --  --  --    Number of Staples Removed --  --  --  --  --    Wound Output (mL) --  --  --  --  --          11/05/21 1100 11/05/21 1045 10/22/21 1134 10/22/21 1100 10/08/21 1429           Wound Image  View All Images --  View Images   - 11/05/21 1046    --  View Images   - 10/22/21 1102    View Images   - 10/08/21 1431      Dressing Appearance --  intact; moist drainage   - 11/05/21 1046 --  intact; moist drainage   - 10/22/21 1102 other (see comments)  POST DEBRIDEMENT   - 10/08/21 1431   Closure --  --  --  --  --    Base --  --  --  --  --    Black (%), Wound Tissue Color --  --  --  --  0   - 10/08/21 1431   Red (%), Wound Tissue Color --  25   - 11/05/21 1046 --  75   - 10/22/21 1102 100   - 10/08/21 1431   Yellow (%), Wound Tissue Color --  75   - 11/05/21 1046 --  25   - 10/22/21 1102 0   - 10/08/21 1431   Periwound --  intact; blanchable   - 11/05/21 1046 --  intact   - 10/22/21 1102 intact   - 10/08/21 1431   Periwound Temperature --  warm   - 11/05/21 1046 --  warm   - 10/22/21 1102 warm   - 10/08/21 1431   Periwound Skin Turgor --  soft   - 11/05/21 1046 --  soft   - 10/22/21 1102 soft   - 10/08/21 1431   Edges --  open; callused   - 11/05/21 1046 --  open   - 10/22/21 1102 open   - 10/08/21 1431   Wound Length (cm) 2.6 cm  POST DEBRIDEMENT   - 11/05/21 1138 2.5 cm   - 11/05/21 1046 2.8 cm (P)   POST DEBRIDEMENT   - 10/22/21 1136 2.8 cm   - 10/22/21 1102 3.6 cm   - 10/08/21 1431   Wound Width (cm) 1.6 cm  POST DEBRIDEMENT   - 11/05/21 1138 1.4 cm   - 11/05/21 1046 2.3 cm (P)   POST DEBRIDEMENT   - 10/22/21 1136 1 cm   - 10/22/21 1102 2.1 cm   - 10/08/21 1431   Wound Depth (cm) 0.4 cm  POST DEBRIDEMENT   - 11/05/21 1138 0.6 cm    - 11/05/21 1046 0.6 cm (P)   POST DEBRIDEMENT   - 10/22/21 1136 0.8 cm   - 10/22/21 1102 0.3 cm   - 10/08/21 1431   Tunneling [Depth (cm)/Location] --  --  --  --  0   - 10/08/21 1431   Undermining [Depth (cm)/Location] --  --  --  --  0   - 10/08/21 1431   Drainage Characteristics/Odor --  sanguineous; serous; malodorous   - 11/05/21 1046 --  serous; serosanguineous; malodorous   - 10/22/21 1102 bleeding controlled; other (see comments)  POST DEBRIDEMENT   - 10/08/21 1431   Drainage Amount --  moderate   - 11/05/21 1046 --  moderate   - 10/22/21 1102 --    Care, Wound cleansed with; sterile normal saline   - 11/05/21 1138 cleansed with; sterile normal saline   - 11/05/21 1046 debrided; silver agent applied; cleansed with; sterile normal saline (P)   DEBRIDED & SILVER NITRATE APPLIED BY MD   - 10/22/21 1136 cleansed with; sterile normal saline   - 10/22/21 1102 cleansed with; sterile normal saline   - 10/08/21 1431   Dressing Care dressing applied; hydrofiber; border dressing; other (see comments)  OPTICELL AG, GAUZE, MEPILEX   - 11/05/21 1138 --  dressing applied; border dressing; hydrofiber; silver impregnated (P)    - 10/22/21 1136 --  dressing applied; other (see comments)  SILVER NITRATE(APPLIED BY MD), GENTAMICIN, MEPILEX   - 10/08/21 1431   Periwound Care --  --  --  --  dry periwound area maintained   - 10/08/21 1431   Adhesive Closure Strips --  --  --  --  --    Number of Adhesive Closure Strips --  --  --  --  --    Sutures Removed Intact --  --  --  --  --    Number of Sutures Removed --  --  --  --  --    Staples Removed Intact --  --  --  --  --    Number of Staples Removed --  --  --  --  --    Wound Output (mL) --  --  --  --  --          10/08/21 1342 09/17/21 1043 09/17/21 1029 08/23/21 1155 08/23/21 1057           Wound Image  View All Images View Images   -BA 10/08/21 1343    View Images   -MADHAVI 09/17/21 1045    View Images   - 09/17/21 1030    View Images    -MADHAVI 08/23/21 1157    View Images   - 08/23/21 1059      Dressing Appearance moist drainage; intact   - 10/08/21 1343 other (see comments)  post debridement   -MADHAVI 09/17/21 1045 moist drainage; intact   - 09/17/21 1030 other (see comments)  POST DEBRIDEMENT   - 08/23/21 1157 moist drainage; intact   -MADHAVI 08/23/21 1059   Closure --  --  --  --  --    Base --  --  --  --  --    Black (%), Wound Tissue Color --  0   -MADHAVI 09/17/21 1045 0   -MADHAVI 09/17/21 1030 0   -MADHAVI 08/23/21 1157 0   -MADHAVI 08/23/21 1059   Red (%), Wound Tissue Color 25   - 10/08/21 1343 100   -MADHAVI 09/17/21 1045 25   -MADHAVI 09/17/21 1030 100   -MADHAVI 08/23/21 1157 75   - 08/23/21 1059   Yellow (%), Wound Tissue Color 75   - 10/08/21 1343 0   -MADHAVI 09/17/21 1045 --  0   -MADAHVI 09/17/21 1030 0   -MADHAVI 08/23/21 1157 25   -MADHAVI 08/23/21 1059   Periwound --  intact   -MADHAVI 09/17/21 1045 intact; dry   -MADHAVI 09/17/21 1030 intact   - 08/23/21 1157 intact; dry   - 08/23/21 1059   Periwound Temperature warm   - 10/08/21 1343 warm   - 09/17/21 1045 warm   - 09/17/21 1030 warm   - 08/23/21 1157 warm   - 08/23/21 1059   Periwound Skin Turgor soft   - 10/08/21 1343 soft   -MADHAVI 09/17/21 1045 firm   -MADHAVI 09/17/21 1030 soft   -MADHAVI 08/23/21 1157 firm   -MADHAVI 08/23/21 1059   Edges callused; open   -BA 10/08/21 1343 open   -MADHAVI 09/17/21 1045 callused; open   -MADHAVI 09/17/21 1030 open   -MADHAVI 08/23/21 1157 callused; open   -MADHAVI 08/23/21 1059   Wound Length (cm) 1.2 cm   -BA 10/08/21 1343 1.5 cm   -MADHAVI 09/17/21 1045 1.7 cm   -MADHAVI 09/17/21 1030 1.5 cm   -MAHDAVI 08/23/21 1157 1.6 cm   -MADHAVI 08/23/21 1059   Wound Width (cm) 1 cm   -BA 10/08/21 1343 1 cm   -MADHAVI 09/17/21 1045 1 cm   -MADHAVI 09/17/21 1030 1.3 cm   -MADHAVI 08/23/21 1157 1.3 cm   -MADHAVI 08/23/21 1059   Wound Depth (cm) 1.8 cm   - 10/08/21 1343 0.2 cm   -MADHAVI 09/17/21 1045 0.4 cm   -MADHAVI 09/17/21 1030 0.2 cm   -MADHAVI 08/23/21 1157 0.2 cm   -MADHAVI 08/23/21 1059   Tunneling [Depth (cm)/Location] --  0   -MADHAVI 09/17/21 1045 0   -MADHAVI 09/17/21 1030 0   -MADHAVI  08/23/21 1157 0   - 08/23/21 1059   Undermining [Depth (cm)/Location] 1.0 AT 6 OCLOCK   - 10/08/21 1343 0   - 09/17/21 1045 0   - 09/17/21 1030 0   - 08/23/21 1157 0   - 08/23/21 1059   Drainage Characteristics/Odor serous; purulent; malodorous   - 10/08/21 1343 bleeding controlled; other (see comments)  post debridement   - 09/17/21 1045 serous; serosanguineous   - 09/17/21 1030 sanguineous   - 08/23/21 1157 serous; serosanguineous   - 08/23/21 1059   Drainage Amount moderate   - 10/08/21 1343 --  moderate   - 09/17/21 1030 small   - 08/23/21 1157 moderate   - 08/23/21 1059   Care, Wound cleansed with; irrigated with; sterile normal saline   - 10/08/21 1343 cleansed with; sterile normal saline   - 09/17/21 1045 cleansed with; sterile normal saline   - 09/17/21 1030 cleansed with; sterile normal saline   - 08/23/21 1157 cleansed with; sterile normal saline   - 08/23/21 1059   Dressing Care --  dressing applied; other (see comments)  SILVER NITRATE APPLIED BY MD, [employer], MEPILEX   - 09/17/21 1045 --  dressing applied; other (see comments)  AQUACEL AG, MEPILEX   - 08/23/21 1157 --    Periwound Care --  dry periwound area maintained   - 09/17/21 1045 --  dry periwound area maintained   - 08/23/21 1157 --    Adhesive Closure Strips --  --  --  --  --    Number of Adhesive Closure Strips --  --  --  --  --    Sutures Removed Intact --  --  --  --  --    Number of Sutures Removed --  --  --  --  --    Staples Removed Intact --  --  --  --  --    Number of Staples Removed --  --  --  --  --    Wound Output (mL) --  --  --  --  --          07/26/21 1139 07/26/21 1042 06/29/21 1340 06/29/21 1329          Wound Image  View All Images View Images   - 07/26/21 1142    View Images   - 07/26/21 1049    View Images   - 06/29/21 1342    View Images   - 06/29/21 1331      Dressing Appearance moist drainage; intact   - 07/26/21 1142 moist drainage; intact   - 07/26/21  1049 --  moist drainage; intact   - 06/29/21 1331   Closure --  --  --  --    Base --  --  --  --    Black (%), Wound Tissue Color --  POST DEBRIDEMENT: SILVER NITRATE APPLIED BY MD   - 07/26/21 1142 0   - 07/26/21 1049 --  0   - 06/29/21 1331   Red (%), Wound Tissue Color --  POST DEBRIDEMENT: SILVER NITRATE APPLIED BY MD   - 07/26/21 1142 100   - 07/26/21 1049 100   - 06/29/21 1342 50   - 06/29/21 1331   Yellow (%), Wound Tissue Color --  POST DEBRIDEMENT: SILVER NITRATE APPLIED BY MD   - 07/26/21 1142 0   - 07/26/21 1049 --  0   - 06/29/21 1331   Periwound intact   - 07/26/21 1142 dry; intact   - 07/26/21 1049 --  intact   - 06/29/21 1331   Periwound Temperature warm   - 07/26/21 1142 warm   - 07/26/21 1049 warm   - 06/29/21 1342 warm   - 06/29/21 1331   Periwound Skin Turgor soft   - 07/26/21 1142 firm   - 07/26/21 1049 firm   - 06/29/21 1342 firm   - 06/29/21 1331   Edges open   - 07/26/21 1142 open   - 07/26/21 1049 callused   - 06/29/21 1342 callused   - 06/29/21 1331   Wound Length (cm) 1.7 cm   - 07/26/21 1142 1.6 cm   - 07/26/21 1049 2 cm   - 06/29/21 1342 2 cm   - 06/29/21 1331   Wound Width (cm) 1.7 cm   - 07/26/21 1142 1.9 cm   - 07/26/21 1049 2 cm   - 06/29/21 1342 2 cm   - 06/29/21 1331   Wound Depth (cm) 0.2 cm   - 07/26/21 1142 0.3 cm   - 07/26/21 1049 0.2 cm   - 06/29/21 1342 0.4 cm   - 06/29/21 1331   Tunneling [Depth (cm)/Location] 0   - 07/26/21 1142 0   - 07/26/21 1049 --  0   - 06/29/21 1331   Undermining [Depth (cm)/Location] 0   - 07/26/21 1142 0   - 07/26/21 1049 --  0   - 06/29/21 1331   Drainage Characteristics/Odor bleeding controlled   - 07/26/21 1142 serous; serosanguineous   - 07/26/21 1049 bleeding controlled   - 06/29/21 1342 serous   - 06/29/21 1331   Drainage Amount none   - 07/26/21 1142 moderate   - 07/26/21 1049 --  moderate   - 06/29/21 1331   Care, Wound cleansed with;  sterile normal saline   - 07/26/21 1142 cleansed with; sterile normal saline   - 07/26/21 1049 cleansed with; sterile normal saline   - 06/29/21 1342 cleansed with; sterile normal saline   - 06/29/21 1331   Dressing Care dressing applied; other (see comments)  SILVER NITRATE APPLIED BY MD, [place], GAUZE, OPTIFOAM   - 07/26/21 1142 --  dressing applied; hydrofiber; silver impregnated; gauze   - 06/29/21 1342 --    Periwound Care dry periwound area maintained   - 07/26/21 1142 --  --  --    Adhesive Closure Strips --  --  --  --    Number of Adhesive Closure Strips --  --  --  --    Sutures Removed Intact --  --  --  --    Number of Sutures Removed --  --  --  --    Staples Removed Intact --  --  --  --    Number of Staples Removed --  --  --  --    Wound Output (mL) --  --  --  --                    User Key  (r) = Recorded By, (t) = Taken By, (c) = Cosigned By    Initials Name Effective Dates Provider Type Discipline    Audra Hampton, RN 06/16/21 -  Registered Nurse Nurse    Joel Munoz, RN 06/16/21 -  Registered Nurse Nurse    Vicenta Montez, RN 06/16/21 -  Registered Nurse Nurse    Shabana James, RN 06/16/21 -  Registered Nurse Nurse    Haleigh Springer, RN 06/16/21 -  Registered Nurse Nurse    Audra Henderson, RN 06/09/21 -  Registered Nurse Nurse    Zara Hemphill, RN 06/09/21 -  Registered Nurse --    Zonia Boyd RNA 12/14/21 - 01/20/22 Registered Nurse Nurse                Wound debridement  Performed by: Shamika Whitehead MD  Authorized by: Shamika Whitehead MD     Correct patient: Identification verified by two methods:     Verbally and Date of birth  Correct procedure/consent    Correct site/side    Correct equipment as applicable    How many wounds are you performing a debridement on?:  1  Wound 1:     Nursing Documentation:     Wound Location:  Right plantar foot ulcer  Measurements:     The total amount debrided on this wound was under 20 cm2.      Provider Documentation    Debridement type:  Sharp/excisional    Betadine      Other:  Sterile 10 blade scalpel     None    Tissue debrided:  Small    Level removed:  Subcutaneous    Patient tolerance:  Good    Hemostasis achieved by:  Direct pressure    Wound Bed Post Debridement: See Photo      Post debridement photo not available.         Assessment and Plan   Diagnoses and all orders for this visit:    1. Diabetic ulcer of other part of right foot associated with type 2 diabetes mellitus, limited to breakdown of skin (HCC) (Primary)    2. Diabetic polyneuropathy associated with type 2 diabetes mellitus (HCC)    Other orders  -     Wound debridement      Switch to Sharmila Ag packed into the wound daily or every other day depending on absorption.      I advised offloading right foot at all times but patient says she has tried this and it still has not healed.  Nonetheless, I recommend an offloading shoe or any other type of option that she would be willing or able to utilize.    Return in 3 weeks for recheck.    Patient was given instructions and counseling regarding their condition or for health maintenance advice, as well as the wound care plan and recommendations. They understand and agree with the plan.  They will follow back up here in the clinic but are instructed to contact us in the interim should they have any new, returning, or worsening symptoms or concerns. Please see specific information pulled into the AVS if appropriate.       Follow Up   Return in about 3 weeks (around 3/15/2022) for Recheck.      Shamika Whitehead MD

## 2022-03-15 ENCOUNTER — OFFICE VISIT (OUTPATIENT)
Dept: WOUND CARE | Facility: HOSPITAL | Age: 57
End: 2022-03-15

## 2022-03-15 VITALS
HEART RATE: 67 BPM | HEIGHT: 65 IN | DIASTOLIC BLOOD PRESSURE: 80 MMHG | RESPIRATION RATE: 18 BRPM | BODY MASS INDEX: 34.32 KG/M2 | TEMPERATURE: 96.7 F | WEIGHT: 206 LBS | SYSTOLIC BLOOD PRESSURE: 162 MMHG

## 2022-03-15 DIAGNOSIS — L97.411 DIABETIC ULCER OF RIGHT MIDFOOT ASSOCIATED WITH TYPE 2 DIABETES MELLITUS, LIMITED TO BREAKDOWN OF SKIN: Primary | ICD-10-CM

## 2022-03-15 DIAGNOSIS — E11.42 DIABETIC POLYNEUROPATHY ASSOCIATED WITH TYPE 2 DIABETES MELLITUS: ICD-10-CM

## 2022-03-15 DIAGNOSIS — E11.621 DIABETIC ULCER OF RIGHT MIDFOOT ASSOCIATED WITH TYPE 2 DIABETES MELLITUS, LIMITED TO BREAKDOWN OF SKIN: Primary | ICD-10-CM

## 2022-03-15 PROCEDURE — 11042 DBRDMT SUBQ TIS 1ST 20SQCM/<: CPT | Performed by: EMERGENCY MEDICINE

## 2022-03-15 NOTE — PROGRESS NOTES
Chief Complaint  Wound Check (DM FOOT ULCER (BS: 94))    Subjective        History of Present Illness    Is a 56-year-old female who has been followed by the wound care clinic for a RLE diabetic foot ulcer.  She has been applying Aquacel Ag to this area.  Severity of the wound has somewhat waxed and waned. Her diabetes is well controlled with her most recent hemoglobin A1c in April 2021 being 6.5.  She also has a history of peripheral vascular disease and has been seen by Dr. Henson in the past.    She has been using PolyMem silver to the wound changed every other day.  She has home health coming out to help her as she is unable to do the dressing changes herself and her mentally disabled son and daughter-in-law stay with her but not really able to help her either.    Allergies:  Aminoglycosides, Neosporin [neomycin-bacitracin zn-polymyx], Polymyxin b, and Pramoxine      Current Outpatient Medications:   •  albuterol sulfate HFA (ProAir HFA) 108 (90 Base) MCG/ACT inhaler, Inhale 2 puffs Every 4 (Four) Hours As Needed., Disp: , Rfl:   •  Ascorbic Acid (Vitamin C) 500 MG capsule, Take 500 mg by mouth Daily., Disp: , Rfl:   •  Aspirin 81 MG capsule, Take 81 mg by mouth Daily., Disp: , Rfl:   •  B-D ULTRAFINE III SHORT PEN 31G X 8 MM misc, USE AS DIRECTED INTRAMUCULARLY FOUR TIMES DAILY FOR 90 DAYS, Disp: , Rfl:   •  benzonatate (TESSALON) 200 MG capsule, Take 1 capsule by mouth 3 (Three) Times a Day As Needed for Cough for up to 30 doses., Disp: 30 capsule, Rfl: 0  •  furosemide (LASIX) 40 MG tablet, Take 40 mg by mouth 2 (Two) Times a Day., Disp: , Rfl:   •  gabapentin (NEURONTIN) 800 MG tablet, Take 800 mg by mouth 3 (Three) Times a Day., Disp: , Rfl:   •  HYDROcodone-acetaminophen (NORCO) 7.5-325 MG per tablet, Take 1 tablet by mouth Every 8 (Eight) Hours., Disp: , Rfl:   •  insulin aspart (NovoLOG FlexPen) 100 UNIT/ML solution pen-injector sc pen, Inject 10 Units under the skin into the appropriate area as directed 3  (Three) Times a Day With Meals., Disp: , Rfl:   •  Insulin Degludec (TRESIBA FLEXTOUCH) 200 UNIT/ML solution pen-injector pen injection, Inject 66 Units under the skin into the appropriate area as directed Every Night., Disp: , Rfl:   •  isosorbide mononitrate (IMDUR) 60 MG 24 hr tablet, Take 60 mg by mouth 2 (Two) Times a Day., Disp: , Rfl:   •  linaclotide (Linzess) 145 MCG capsule capsule, Take 145 mcg by mouth Every Morning Before Breakfast., Disp: , Rfl:   •  loratadine (CLARITIN) 10 MG tablet, Take 10 mg by mouth Daily., Disp: , Rfl:   •  metFORMIN (GLUCOPHAGE) 1000 MG tablet, Take 1,000 mg by mouth 2 (two) times a day., Disp: , Rfl:   •  metoprolol tartrate (LOPRESSOR) 50 MG tablet, Take 50 mg by mouth Daily., Disp: , Rfl:   •  prasugrel (EFFIENT) 10 MG tablet, Take 10 mg by mouth Daily., Disp: , Rfl:   •  simvastatin (ZOCOR) 40 MG tablet, Take 40 mg by mouth Every Night., Disp: , Rfl:     Past Medical History:   Diagnosis Date   • Anxiety     ANIXETY ATTACK AT WORK ABOUT A MONTH AGO   • Arthritis    • Condition not found     PSYCHIATRIC PROBLEMS- PT TOOK AN OVERDOSE OF MEDICATION FOR BLOOD SUGAR. TOOK 8 PILLS.   • Condition not found     HERBS USED- BC POWDER AS NEEDED   • Congestive heart failure (CHF) (HCC)    • COPD (chronic obstructive pulmonary disease) (HCC)    • Diabetes (HCC)    • Diabetic neuropathy (HCC)    • Health care home, active care coordination     [facility] (FAX: [phone])   • History of anesthesia reaction     HARD TIME WAKING UP SOMETIME   • History of hiatal hernia    • History of kidney stones    • Hyperlipidemia    • Hypertension    • Irregular heartbeat     PT IS SEEING  (RECENTLY DID A 24-HOUR HEART MONITOR).   • Peripheral artery disease (HCC)    • Stroke (HCC)     PIN STROKE FROMA MIGRAINE/ 20 YEARS AGO   • Wears glasses      Past Surgical History:   Procedure Laterality Date   • ABDOMINAL SURGERY      33 HERNIA REPAIR, GALLBLADER REMOVED   • BLADDER REPAIR     •  "CHOLECYSTECTOMY     • DILATATION AND CURETTAGE      X2   • HERNIA REPAIR     • HYSTERECTOMY  1992    2 CS   • TOE AMPUTATION Right 02/14/2017    RIGHT GREAT TOE   • TONSILLECTOMY       Social History     Socioeconomic History   • Marital status:    Tobacco Use   • Smoking status: Former Smoker   • Smokeless tobacco: Never Used   • Tobacco comment: QUIT 4 YEARS AGO/ RECENLTY STARTED AGAIN 3 CIGS A DAY    Substance and Sexual Activity   • Alcohol use: Not Currently   • Drug use: Not Currently     Family History   Problem Relation Age of Onset   • Diabetes Mother    • Anesthesia problems Mother         HARD TIME WAKING UP SOMETIMES          Objective     Vitals:    03/15/22 1059   BP: 162/80   BP Location: Left arm   Patient Position: Sitting   Pulse: 67   Resp: 18   Temp: 96.7 °F (35.9 °C)   TempSrc: Temporal   Weight: 93.4 kg (206 lb)   Height: 165.1 cm (65\")   PainSc: 0-No pain     Body mass index is 34.28 kg/m².    STEADI Fall Risk Assessment has not been completed.     Review of Systems     ROS:  No fevers, chills, sweats, or body aches. No shortness of breath.     I have reviewed the HPI and ROS as documented by MA/RN. Shamika Whitehead MD    Physical Exam     NAD, well dressed, well nourished  Normocephalic, atraumatic  EOMI, no scleral icterus  No respiratory distress, no cough  AAOx3, pleasant, cooperative  Ulceration right medial plantar foot is again improved from last visit.  The distalmost wound is almost completely healed.  Mild to moderate surrounding callus and periwound maceration.  No erythema, induration, fluctuance.    See photo for details.          Result Review :  The following data was reviewed by: Shamika Whitehead MD on 03/15/2022:    Prior notes and images.    Wound Avatar Documentation     Active Wound LDAs        Wound 06/29/21 1328 Right medial foot    Placement date 06/29/21   -MADHAVI 06/29/21 1328      Placement time 1328   -MADHAVI 06/29/21 1328 Days 258    Present on Hospital Admission: Y   " - 06/29/21 1328 Side: Right   - 06/29/21 1328    Orientation: medial   - 06/29/21 1328 Location: foot   - 06/29/21 1328    Assessments     Row Name 03/15/22 1129 03/15/22 1101 02/22/22 1105 01/19/22 1513 01/19/22 0815                      Wound Image View All Images  View Images   - 03/15/22 1131    View Images   - 03/15/22 1102    View Images   - 02/22/22 1106    View Images   - 01/19/22 1532   --     Pressure Injury Stage --  --  --  --  --     Dressing Appearance other (see comments)  POST DEBRIDEMENT   - 03/15/22 1131 intact   - 03/15/22 1102 moist drainage;intact   - 02/22/22 1106 --  dry;intact   - 01/19/22 1136    Closure --  --  --  --  --     Base --  --  --  --  dressing in place, unable to visualize   - 01/19/22 1136    Black (%), Wound Tissue Color --  0   - 03/15/22 1102 --  --  --     Red (%), Wound Tissue Color --  50   - 03/15/22 1102 100   - 02/22/22 1106 --  --     Yellow (%), Wound Tissue Color --  50   - 03/15/22 1102 --  --  --     Periwound --  intact   - 03/15/22 1102 intact   - 02/22/22 1106 --  --     Periwound Temperature --  warm   - 03/15/22 1102 warm   - 02/22/22 1106 --  --     Periwound Skin Turgor --  soft   - 03/15/22 1102 firm   - 02/22/22 1106 --  --     Edges --  callused;open   - 03/15/22 1102 callused   - 02/22/22 1106 --  --     Wound Length (cm) 1.4 cm   - 03/15/22 1131 1.1 cm   - 03/15/22 1102 2.5 cm   - 02/22/22 1106 --  --     Wound Width (cm) 1.4 cm   - 03/15/22 1131 1.9 cm   -MADHAVI 03/15/22 1102 1.8 cm   -BA 02/22/22 1106 --  --     Wound Depth (cm) 0.4 cm   -MADHAVI 03/15/22 1131 0.5 cm   -MADHAVI 03/15/22 1102 0.4 cm   -BA 02/22/22 1106 --  --     Wound Surface Area (cm^2) 1.96 cm^2   -MADHAVI 03/15/22 1131 2.09 cm^2   -MADHAVI 03/15/22 1102 --  --  --     Wound Volume (cm^3) 0.784 cm^3   -MADHAVI 03/15/22 1131 1.045 cm^3   -MADHAVI 03/15/22 1102 --  --  --     Tunneling [Depth (cm)/Location] 0   -MADHAVI 03/15/22 1131 0   -MADHAVI 03/15/22 1102 --  --   --     Undermining [Depth (cm)/Location] 0   - 03/15/22 1131 0   - 03/15/22 1102 --  --  --     Drainage Characteristics/Odor bleeding controlled;other (see comments)  POST DEBRIDEMENT   - 03/15/22 1131 serous;serosanguineous   - 03/15/22 1102 serosanguineous   - 02/22/22 1106 --  --     Drainage Amount --  moderate   - 03/15/22 1102 moderate   - 02/22/22 1106 --  --     Care, Wound cleansed with;sterile normal saline   - 03/15/22 1131 cleansed with;sterile normal saline   - 03/15/22 1102 cleansed with;sterile normal saline   - 02/22/22 1106 --  --     Dressing Care dressing applied;other (see comments)  MONICA AG, MEPILEX   - 03/15/22 1131 --  dressing applied;border dressing;collagen;silver impregnated;other (see comments)  MONICA AG, OPTIFOAM   - 02/22/22 1140 --  --     Periwound Care dry periwound area maintained   - 03/15/22 1131 --  --  --  --     Adhesive Closure Strips --  --  --  --  --     Number of Adhesive Closure Strips --  --  --  --  --     Sutures Removed Intact --  --  --  --  --     Number of Sutures Removed --  --  --  --  --     Staples Removed Intact --  --  --  --  --     Number of Staples Removed --  --  --  --  --     Wound Output (mL) --  --  --  --  --     Row Name 01/17/22 2200 01/17/22 2030 01/17/22 1900 01/16/22 0822 01/16/22 0100                      Wound Image View All Images --  --  --  --  --     Pressure Injury Stage --  --  --  --  --     Dressing Appearance dry;intact   -MC 01/17/22 2211 moist drainage   -MB 01/17/22 2129 dry;intact   -CW 01/17/22 1930 intact;dry   -BB 01/16/22 1310 moist drainage   -NENITA 01/16/22 0819    Closure --  --  Adhesive bandage   - 01/17/22 1930 --  --     Base --  pink;moist   -MB 01/17/22 2129 yellow;moist   -CW 01/17/22 1930 --  moist;pink   -NENITA 01/16/22 0819    Black (%), Wound Tissue Color --  --  --  --  --     Red (%), Wound Tissue Color --  --  --  --  --     Yellow (%), Wound Tissue Color --  --  --  --  --      Periwound --  pink;edematous   -MB 01/17/22 2129 warm;moist   - 01/17/22 1930 --  pink;warm;redness   - 01/16/22 0819    Periwound Temperature --  --  warm   - 01/17/22 1930 --  warm   - 01/16/22 0819    Periwound Skin Turgor --  --  firm   - 01/17/22 1930 --  firm   - 01/16/22 0819    Edges --  --  open   - 01/17/22 1930 --  callused   - 01/16/22 0819    Wound Length (cm) --  2.5 cm   -MB 01/17/22 2129 --  --  3 cm   - 01/16/22 0819    Wound Width (cm) --  3 cm   -MB 01/17/22 2129 --  --  2 cm   - 01/16/22 0819    Wound Depth (cm) --  0.5 cm   -MB 01/17/22 2129 --  --  0.3 cm   - 01/16/22 0819    Wound Surface Area (cm^2) --  --  --  --  --     Wound Volume (cm^3) --  --  --  --  --     Tunneling [Depth (cm)/Location] --  --  --  --  --     Undermining [Depth (cm)/Location] --  --  --  --  --     Drainage Characteristics/Odor --  serous   -MB 01/17/22 2129 yellow   - 01/17/22 1930 --  --     Drainage Amount --  small   -MB 01/17/22 2129 scant   - 01/17/22 1930 --  small   - 01/16/22 0819    Care, Wound --  irrigated with;cleansed with;sterile normal saline   -MB 01/17/22 2129 cleansed with;sterile normal saline;moist wound environment maintained;silver agent applied   - 01/17/22 1930 --  cleansed with;sterile normal saline   - 01/16/22 0819    Dressing Care --  dressing applied;dressing changed;hydrofiber;silver impregnated;silicone   -MB 01/17/22 2129 dressing changed;other (see comments)  mepilex   - 01/17/22 1930 --  dressing changed  Aquacel AG applied moistened with saline and guaze applied and roll guaze.   - 01/16/22 0819    Periwound Care --  --  --  --  barrier film applied   - 01/16/22 0819    Adhesive Closure Strips --  --  --  --  --     Number of Adhesive Closure Strips --  --  --  --  --     Sutures Removed Intact --  --  --  --  --     Number of Sutures Removed --  --  --  --  --     Staples Removed Intact --  --  --  --  --     Number of Staples Removed --  --   --  --  --     Wound Output (mL) --  --  --  --  --     Row Name 01/15/22 2100 01/15/22 0815 01/15/22 0355 01/06/22 0937 01/06/22 0839                      Wound Image View All Images --  --   View Images   - 01/15/22 0400    View Images   - 01/06/22 0938    View Images   - 01/06/22 0841      Pressure Injury Stage --  --  --  --  --     Dressing Appearance intact   - 01/15/22 2348 intact   - 01/15/22 1549 dry;intact   - 01/15/22 0400 other (see comments)  post debridement   - 01/06/22 0938 intact;moist drainage   - 01/06/22 0841    Closure --  --  --  --  --     Base --  --  --  --  --     Black (%), Wound Tissue Color --  --  --  0   - 01/06/22 0938 0   - 01/06/22 0841    Red (%), Wound Tissue Color --  --  --  100   - 01/06/22 0938 100   - 01/06/22 0841    Yellow (%), Wound Tissue Color --  --  --  0   - 01/06/22 0938 0   - 01/06/22 0841    Periwound --  --  pink   - 01/15/22 0400 intact   - 01/06/22 0938 intact   - 01/06/22 0841    Periwound Temperature --  --  warm   - 01/15/22 0400 warm   - 01/06/22 0938 warm   - 01/06/22 0841    Periwound Skin Turgor --  --  firm   - 01/15/22 0400 soft   - 01/06/22 0938 soft   - 01/06/22 0841    Edges --  --  callused   - 01/15/22 040 open   - 01/06/22 0938 callused;open   - 01/06/22 0841    Wound Length (cm) --  --  --  3 cm   - 01/06/22 0938 3 cm   - 01/06/22 0841    Wound Width (cm) --  --  --  2.1 cm   - 01/06/22 0938 2.1 cm   - 01/06/22 0841    Wound Depth (cm) --  --  --  0.3 cm   - 01/06/22 0938 0.5 cm   - 01/06/22 0939    Wound Surface Area (cm^2) --  --  --  --  --     Wound Volume (cm^3) --  --  --  --  --     Tunneling [Depth (cm)/Location] --  --  --  0   - 01/06/22 0938 0   - 01/06/22 0841    Undermining [Depth (cm)/Location] --  --  --  0   - 01/06/22 0938 0   - 01/06/22 0841    Drainage Characteristics/Odor --  --  other (see comments)   - 01/15/22 0400 bleeding controlled;other (see  comments)  post debridement   - 01/06/22 0938 serous;serosanguineous   - 01/06/22 0841    Drainage Amount --  --  none   - 01/15/22 0400 --  moderate   - 01/06/22 0841    Care, Wound --  --  cleansed with;sterile normal saline   - 01/15/22 0400 cleansed with;sterile normal saline   - 01/06/22 0938 cleansed with;sterile normal saline   - 01/06/22 0841    Dressing Care --  --  dressing changed  aquacel AG   - 01/15/22 0400 dressing applied;other (see comments)  aquacel ag, mepilex   - 01/06/22 0938 --     Periwound Care --  --  barrier ointment applied   - 01/15/22 0400 dry periwound area maintained   - 01/06/22 0938 --     Adhesive Closure Strips --  --  --  --  --     Number of Adhesive Closure Strips --  --  --  --  --     Sutures Removed Intact --  --  --  --  --     Number of Sutures Removed --  --  --  --  --     Staples Removed Intact --  --  --  --  --     Number of Staples Removed --  --  --  --  --     Wound Output (mL) --  --  --  --  --     Row Name 11/29/21 1120 11/29/21 1041 11/05/21 1100 11/05/21 1045 10/22/21 1134                      Wound Image View All Images  View Images   - 11/29/21 1121    View Images   - 11/29/21 1045   --   View Images   - 11/05/21 1046   --     Pressure Injury Stage --  --  --  --  --     Dressing Appearance other (see comments)  POST DEBRIDEMENT   - 11/29/21 1121 moist drainage;intact   - 11/29/21 1045 --  intact;moist drainage   - 11/05/21 1046 --     Closure --  --  --  --  --     Base --  --  --  --  --     Black (%), Wound Tissue Color 0   - 11/29/21 1121 0   - 11/29/21 1045 --  --  --     Red (%), Wound Tissue Color 100   - 11/29/21 1121 100   - 11/29/21 1045 --  25   - 11/05/21 1046 --     Yellow (%), Wound Tissue Color 0   - 11/29/21 1121 0   - 11/29/21 1045 --  75   - 11/05/21 1046 --     Periwound intact   - 11/29/21 1121 macerated   - 11/29/21 1045 --  intact;blanchable   - 11/05/21 1046 --     Periwound  Temperature warm   - 11/29/21 1121 warm   - 11/29/21 1045 --  warm   - 11/05/21 1046 --     Periwound Skin Turgor soft   - 11/29/21 1121 soft   - 11/29/21 1045 --  soft   - 11/05/21 1046 --     Edges open   - 11/29/21 1121 open   - 11/29/21 1045 --  open;callused   - 11/05/21 1046 --     Wound Length (cm) 3.3 cm   - 11/29/21 1121 2.7 cm   - 11/29/21 1045 2.6 cm  POST DEBRIDEMENT   - 11/05/21 1138 2.5 cm   - 11/05/21 1046 2.8 cm (P)   POST DEBRIDEMENT   - 10/22/21 1136    Wound Width (cm) 1.7 cm   - 11/29/21 1121 1.6 cm   - 11/29/21 1045 1.6 cm  POST DEBRIDEMENT   - 11/05/21 1138 1.4 cm   - 11/05/21 1046 2.3 cm (P)   POST DEBRIDEMENT   - 10/22/21 1136    Wound Depth (cm) 0.9 cm   - 11/29/21 1121 1 cm   - 11/29/21 1045 0.4 cm  POST DEBRIDEMENT   - 11/05/21 1138 0.6 cm   - 11/05/21 1046 0.6 cm (P)   POST DEBRIDEMENT   - 10/22/21 1136    Wound Surface Area (cm^2) --  --  --  --  --     Wound Volume (cm^3) --  --  --  --  --     Tunneling [Depth (cm)/Location] 0   - 11/29/21 1121 0   - 11/29/21 1045 --  --  --     Undermining [Depth (cm)/Location] 0   - 11/29/21 1121 1.1 from 1 to 5 O'CLOCK (1.8 at 3:00)   - 11/29/21 1045 --  --  --     Drainage Characteristics/Odor other (see comments)  POST DEBRIDEMENT   - 11/29/21 1121 serous;serosanguineous;malodorous   - 11/29/21 1045 --  sanguineous;serous;malodorous   - 11/05/21 1046 --     Drainage Amount --  moderate   - 11/29/21 1045 --  moderate   - 11/05/21 1046 --     Care, Wound cleansed with;sterile normal saline   - 11/29/21 1121 cleansed with;sterile normal saline   - 11/29/21 1045 cleansed with;sterile normal saline   - 11/05/21 1138 cleansed with;sterile normal saline   - 11/05/21 1046 debrided;silver agent applied;cleansed with;sterile normal saline (P)   DEBRIDED & SILVER NITRATE APPLIED BY MD   - 10/22/21 1136    Dressing Care dressing applied;other (see comments)  POLYMEM SILVER, MEPILEX    - 11/29/21 1121 --  dressing applied;hydrofiber;border dressing;other (see comments)  OPTICELL AG, GAUZE, MEPILEX   - 11/05/21 1138 --  dressing applied;border dressing;hydrofiber;silver impregnated (P)    - 10/22/21 1136    Periwound Care dry periwound area maintained   - 11/29/21 1121 --  --  --  --     Adhesive Closure Strips --  --  --  --  --     Number of Adhesive Closure Strips --  --  --  --  --     Sutures Removed Intact --  --  --  --  --     Number of Sutures Removed --  --  --  --  --     Staples Removed Intact --  --  --  --  --     Number of Staples Removed --  --  --  --  --     Wound Output (mL) --  --  --  --  --     Row Name 10/22/21 1100 10/08/21 1429 10/08/21 1342 09/17/21 1043 09/17/21 1029                      Wound Image View All Images  View Images   - 10/22/21 1102    View Images   - 10/08/21 1431    View Images   - 10/08/21 1343    View Images   - 09/17/21 1045    View Images   - 09/17/21 1030      Pressure Injury Stage --  --  --  --  --     Dressing Appearance intact;moist drainage   - 10/22/21 1102 other (see comments)  POST DEBRIDEMENT   - 10/08/21 1431 moist drainage;intact   - 10/08/21 1343 other (see comments)  post debridement   - 09/17/21 1045 moist drainage;intact   - 09/17/21 1030    Closure --  --  --  --  --     Base --  --  --  --  --     Black (%), Wound Tissue Color --  0   - 10/08/21 1431 --  0   - 09/17/21 1045 0   - 09/17/21 1030    Red (%), Wound Tissue Color 75   - 10/22/21 1102 100   - 10/08/21 1431 25   - 10/08/21 1343 100   - 09/17/21 1045 25   - 09/17/21 1030    Yellow (%), Wound Tissue Color 25   - 10/22/21 1102 0   - 10/08/21 1431 75   - 10/08/21 1343 0   - 09/17/21 1045 --  0   - 09/17/21 1030    Periwound intact   - 10/22/21 1102 intact   - 10/08/21 1431 --  intact   - 09/17/21 1045 intact;dry   - 09/17/21 1030    Periwound Temperature warm   - 10/22/21 1102 warm   - 10/08/21 1431 warm   -  10/08/21 1343 warm   - 09/17/21 1045 warm   - 09/17/21 1030    Periwound Skin Turgor soft   - 10/22/21 1102 soft   - 10/08/21 1431 soft   - 10/08/21 1343 soft   - 09/17/21 1045 firm   - 09/17/21 1030    Edges open   - 10/22/21 1102 open   - 10/08/21 1431 callused;open   - 10/08/21 1343 open   - 09/17/21 1045 callused;open   - 09/17/21 1030    Wound Length (cm) 2.8 cm   - 10/22/21 1102 3.6 cm   - 10/08/21 1431 1.2 cm   - 10/08/21 1343 1.5 cm   - 09/17/21 1045 1.7 cm   - 09/17/21 1030    Wound Width (cm) 1 cm   - 10/22/21 1102 2.1 cm   - 10/08/21 1431 1 cm   - 10/08/21 1343 1 cm   - 09/17/21 1045 1 cm   - 09/17/21 1030    Wound Depth (cm) 0.8 cm   - 10/22/21 1102 0.3 cm   - 10/08/21 1431 1.8 cm   - 10/08/21 1343 0.2 cm   - 09/17/21 1045 0.4 cm   - 09/17/21 1030    Wound Surface Area (cm^2) --  --  --  --  --     Wound Volume (cm^3) --  --  --  --  --     Tunneling [Depth (cm)/Location] --  0   - 10/08/21 1431 --  0   - 09/17/21 1045 0   - 09/17/21 1030    Undermining [Depth (cm)/Location] --  0   - 10/08/21 1431 1.0 AT 6 OCLOCK   - 10/08/21 1343 0   - 09/17/21 1045 0   - 09/17/21 1030    Drainage Characteristics/Odor serous;serosanguineous;malodorous   - 10/22/21 1102 bleeding controlled;other (see comments)  POST DEBRIDEMENT   - 10/08/21 1431 serous;purulent;malodorous   - 10/08/21 1343 bleeding controlled;other (see comments)  post debridement   - 09/17/21 1045 serous;serosanguineous   - 09/17/21 1030    Drainage Amount moderate   - 10/22/21 1102 --  moderate   - 10/08/21 1343 --  moderate   - 09/17/21 1030    Care, Wound cleansed with;sterile normal saline   - 10/22/21 1102 cleansed with;sterile normal saline   - 10/08/21 1431 cleansed with;irrigated with;sterile normal saline   - 10/08/21 1343 cleansed with;sterile normal saline   - 09/17/21 1045 cleansed with;sterile normal saline   - 09/17/21 1030    Dressing Care --   dressing applied;other (see comments)  SILVER NITRATE(APPLIED BY MD), GENTAMICIN, MEPILEX   - 10/08/21 1431 --  dressing applied;other (see comments)  SILVER NITRATE APPLIED BY MD, AQUACEL AG, MEPILEX   - 09/17/21 1045 --     Periwound Care --  dry periwound area maintained   - 10/08/21 1431 --  dry periwound area maintained   - 09/17/21 1045 --     Adhesive Closure Strips --  --  --  --  --     Number of Adhesive Closure Strips --  --  --  --  --     Sutures Removed Intact --  --  --  --  --     Number of Sutures Removed --  --  --  --  --     Staples Removed Intact --  --  --  --  --     Number of Staples Removed --  --  --  --  --     Wound Output (mL) --  --  --  --  --     Row Name 08/23/21 1155 08/23/21 1057 07/26/21 1139 07/26/21 1042 06/29/21 1340                      Wound Image View All Images  View Images   - 08/23/21 1157    View Images   - 08/23/21 1059    View Images   - 07/26/21 1142    View Images   - 07/26/21 1049    View Images   - 06/29/21 1342      Pressure Injury Stage --  --  --  --  --     Dressing Appearance other (see comments)  POST DEBRIDEMENT   - 08/23/21 1157 moist drainage;intact   - 08/23/21 1059 moist drainage;intact   - 07/26/21 1142 moist drainage;intact   - 07/26/21 1049 --     Closure --  --  --  --  --     Base --  --  --  --  --     Black (%), Wound Tissue Color 0   - 08/23/21 1157 0   - 08/23/21 1059 --  POST DEBRIDEMENT: SILVER NITRATE APPLIED BY MD   - 07/26/21 1142 0   - 07/26/21 1049 --     Red (%), Wound Tissue Color 100   - 08/23/21 1157 75   - 08/23/21 1059 --  POST DEBRIDEMENT: SILVER NITRATE APPLIED BY MD   - 07/26/21 1142 100   - 07/26/21 1049 100   - 06/29/21 1342    Yellow (%), Wound Tissue Color 0   - 08/23/21 1157 25   - 08/23/21 1059 --  POST DEBRIDEMENT: SILVER NITRATE APPLIED BY MD   - 07/26/21 1142 0   - 07/26/21 1049 --     Periwound intact   - 08/23/21 1157 intact;dry   - 08/23/21 1059 intact   -  07/26/21 1142 dry;intact   - 07/26/21 1049 --     Periwound Temperature warm   - 08/23/21 1157 warm   - 08/23/21 1059 warm   - 07/26/21 1142 warm   - 07/26/21 1049 warm   - 06/29/21 1342    Periwound Skin Turgor soft   - 08/23/21 1157 firm   - 08/23/21 1059 soft   - 07/26/21 1142 firm   - 07/26/21 1049 firm   - 06/29/21 1342    Edges open   - 08/23/21 1157 callused;open   - 08/23/21 1059 open   - 07/26/21 1142 open   - 07/26/21 1049 callused   - 06/29/21 1342    Wound Length (cm) 1.5 cm   - 08/23/21 1157 1.6 cm   - 08/23/21 1059 1.7 cm   - 07/26/21 1142 1.6 cm   - 07/26/21 1049 2 cm   - 06/29/21 1342    Wound Width (cm) 1.3 cm   - 08/23/21 1157 1.3 cm   - 08/23/21 1059 1.7 cm   - 07/26/21 1142 1.9 cm   - 07/26/21 1049 2 cm   - 06/29/21 1342    Wound Depth (cm) 0.2 cm   - 08/23/21 1157 0.2 cm   - 08/23/21 1059 0.2 cm   - 07/26/21 1142 0.3 cm   - 07/26/21 1049 0.2 cm   - 06/29/21 1342    Wound Surface Area (cm^2) --  --  --  --  --     Wound Volume (cm^3) --  --  --  --  --     Tunneling [Depth (cm)/Location] 0   - 08/23/21 1157 0   - 08/23/21 1059 0   - 07/26/21 1142 0   - 07/26/21 1049 --     Undermining [Depth (cm)/Location] 0   - 08/23/21 1157 0   - 08/23/21 1059 0   - 07/26/21 1142 0   - 07/26/21 1049 --     Drainage Characteristics/Odor sanguineous   - 08/23/21 1157 serous;serosanguineous   - 08/23/21 1059 bleeding controlled   - 07/26/21 1142 serous;serosanguineous   - 07/26/21 1049 bleeding controlled   - 06/29/21 1342    Drainage Amount small   - 08/23/21 1157 moderate   - 08/23/21 1059 none   - 07/26/21 1142 moderate   - 07/26/21 1049 --     Care, Wound cleansed with;sterile normal saline   - 08/23/21 1157 cleansed with;sterile normal saline   - 08/23/21 1059 cleansed with;sterile normal saline   - 07/26/21 1142 cleansed with;sterile normal saline   - 07/26/21 1049 cleansed with;sterile normal saline   -  06/29/21 1342    Dressing Care dressing applied;other (see comments)  AQUACEL AG, MEPILEX   - 08/23/21 1157 --  dressing applied;other (see comments)  SILVER NITRATE APPLIED BY MD, Cabeo, GAUZE, OPTIFOAM   - 07/26/21 1142 --  dressing applied;hydrofiber;silver impregnated;gauze   - 06/29/21 1342    Periwound Care dry periwound area maintained   - 08/23/21 1157 --  dry periwound area maintained   - 07/26/21 1142 --  --     Adhesive Closure Strips --  --  --  --  --     Number of Adhesive Closure Strips --  --  --  --  --     Sutures Removed Intact --  --  --  --  --     Number of Sutures Removed --  --  --  --  --     Staples Removed Intact --  --  --  --  --     Number of Staples Removed --  --  --  --  --     Wound Output (mL) --  --  --  --  --     Row Name 06/29/21 1329              Wound Image View All Images  View Images   - 06/29/21 1331      Pressure Injury Stage --     Dressing Appearance moist drainage;intact   - 06/29/21 1331    Closure --     Base --     Black (%), Wound Tissue Color 0   - 06/29/21 1331    Red (%), Wound Tissue Color 50   - 06/29/21 1331    Yellow (%), Wound Tissue Color 0   - 06/29/21 1331    Periwound intact   - 06/29/21 1331    Periwound Temperature warm   - 06/29/21 1331    Periwound Skin Turgor firm   - 06/29/21 1331    Edges callused   - 06/29/21 1331    Wound Length (cm) 2 cm   - 06/29/21 1331    Wound Width (cm) 2 cm   - 06/29/21 1331    Wound Depth (cm) 0.4 cm   - 06/29/21 1331    Wound Surface Area (cm^2) --     Wound Volume (cm^3) --     Tunneling [Depth (cm)/Location] 0   - 06/29/21 1331    Undermining [Depth (cm)/Location] 0   - 06/29/21 1331    Drainage Characteristics/Odor serous   - 06/29/21 1331    Drainage Amount moderate   - 06/29/21 1331    Care, Wound cleansed with;sterile normal saline   - 06/29/21 1331    Dressing Care --     Periwound Care --     Adhesive Closure Strips --     Number of Adhesive Closure Strips --      Sutures Removed Intact --     Number of Sutures Removed --     Staples Removed Intact --     Number of Staples Removed --     Wound Output (mL) --                 User Key  (r) = Recorded By, (t) = Taken By, (c) = Cosigned By    Initials Name Effective Dates Provider Type Discipline    Audra Hampton, RN 06/16/21 -  Registered Nurse Nurse    Joel Munoz, RN 06/16/21 -  Registered Nurse Nurse    Vicenta Montez, RN 06/16/21 -  Registered Nurse Nurse    Shabana James, RN 06/16/21 -  Registered Nurse Nurse    Haleigh Springer, RN 06/16/21 -  Registered Nurse Nurse    Audra Henderson, RN 06/09/21 -  Registered Nurse Nurse    Zara Hemphill RN 06/09/21 -  Registered Nurse --    Zonia Boyd RNA 12/14/21 - 01/20/22 Registered Nurse Nurse                Wound debridement  Performed by: Shamika Whitehead MD  Authorized by: Shamika Whitehead MD     Correct patient: Identification verified by two methods:     Verbally and Date of birth  Correct procedure/consent    Correct site/side    Correct equipment as applicable    How many wounds are you performing a debridement on?:  1  Wound 1:     Nursing Documentation:     Wound Location:  Right plantar foot  Measurements:     The total amount debrided on this wound was under 20 cm2.     Provider Documentation    Debridement type:  Sharp/excisional    Betadine      Other:  Sterile 10 blade scalpel     None    Tissue debrided:  Moderate    Level removed:  Subcutaneous    Patient tolerance:  Good    Hemostasis achieved by:  Silver nitrate    Wound Bed Post Debridement: See Photo                   Assessment and Plan   Diagnoses and all orders for this visit:    1. Diabetic ulcer of right midfoot associated with type 2 diabetes mellitus, limited to breakdown of skin (HCC) (Primary)    2. Diabetic polyneuropathy associated with type 2 diabetes mellitus (HCC)    Other orders  -     Wound debridement      Continue Sharmila Ag packed into the wound daily or  every other day depending on absorption.      I continue to strongly advised offloading but the patient says she does not walk on it very much and that she cannot afford to get an offloading shoe due to being on a fixed income.    Recheck 4 weeks.    Patient was given instructions and counseling regarding their condition or for health maintenance advice, as well as the wound care plan and recommendations. They understand and agree with the plan.  They will follow back up here in the clinic but are instructed to contact us in the interim should they have any new, returning, or worsening symptoms or concerns. Please see specific information pulled into the AVS if appropriate.       Follow Up   Return in about 4 weeks (around 4/12/2022) for Recheck.      Shamika Whitehead MD

## 2022-04-14 ENCOUNTER — OFFICE VISIT (OUTPATIENT)
Dept: WOUND CARE | Facility: HOSPITAL | Age: 57
End: 2022-04-14

## 2022-04-14 VITALS
HEART RATE: 66 BPM | SYSTOLIC BLOOD PRESSURE: 148 MMHG | WEIGHT: 212.52 LBS | TEMPERATURE: 96.7 F | HEIGHT: 65 IN | DIASTOLIC BLOOD PRESSURE: 78 MMHG | BODY MASS INDEX: 35.41 KG/M2

## 2022-04-14 DIAGNOSIS — L97.412 DIABETIC ULCER OF RIGHT MIDFOOT ASSOCIATED WITH TYPE 2 DIABETES MELLITUS, WITH FAT LAYER EXPOSED: Primary | ICD-10-CM

## 2022-04-14 DIAGNOSIS — E11.42 DIABETIC POLYNEUROPATHY ASSOCIATED WITH TYPE 2 DIABETES MELLITUS: ICD-10-CM

## 2022-04-14 DIAGNOSIS — E11.621 DIABETIC ULCER OF RIGHT MIDFOOT ASSOCIATED WITH TYPE 2 DIABETES MELLITUS, WITH FAT LAYER EXPOSED: Primary | ICD-10-CM

## 2022-04-14 PROCEDURE — 11042 DBRDMT SUBQ TIS 1ST 20SQCM/<: CPT | Performed by: EMERGENCY MEDICINE

## 2022-04-14 NOTE — PROGRESS NOTES
Chief Complaint  Wound Check (Right plantar foot wound ( BS ))    Subjective        Wound Check        Is a very pleasant 57-year-old female who has been followed by the wound care clinic for a RLE diabetic foot ulcer.  She has been applying Aquacel Ag to this area.  Severity of the wound has somewhat waxed and waned. Her diabetes was previously well controlled with her most recent hemoglobin A1c in our system from April 2021 being 6.5.  However, more recent BMP and fingerstick glucose measurements have been mostly in the 200s.  She also has a history of peripheral vascular disease and has been seen by Dr. Henson in the past.    She has been applying Sharmila to the wound every other day.  She has home health coming out to help her as she is unable to do the dressing changes herself and her mentally disabled son and daughter-in-law stay with her but not really able to help her either.    She is very depressed that her wound will not heal and is feeling discouraged.  She also has a lot of financial distress because she is on a fixed income and her mortgage for her new mobile home is more than that.  She had people living there that were helping pay but they moved out.  Her son is not currently able to work and is not helping her with any bills so she is extremely stressed and frustrated currently.    Allergies:  Aminoglycosides, Neosporin [neomycin-bacitracin zn-polymyx], Polymyxin b, and Pramoxine      Current Outpatient Medications:   •  albuterol sulfate HFA (ProAir HFA) 108 (90 Base) MCG/ACT inhaler, Inhale 2 puffs Every 4 (Four) Hours As Needed., Disp: , Rfl:   •  Ascorbic Acid (Vitamin C) 500 MG capsule, Take 500 mg by mouth Daily., Disp: , Rfl:   •  Aspirin 81 MG capsule, Take 81 mg by mouth Daily., Disp: , Rfl:   •  B-D ULTRAFINE III SHORT PEN 31G X 8 MM misc, USE AS DIRECTED INTRAMUCULARLY FOUR TIMES DAILY FOR 90 DAYS, Disp: , Rfl:   •  benzonatate (TESSALON) 200 MG capsule, Take 1 capsule by mouth 3 (Three)  Times a Day As Needed for Cough for up to 30 doses., Disp: 30 capsule, Rfl: 0  •  furosemide (LASIX) 40 MG tablet, Take 40 mg by mouth 2 (Two) Times a Day., Disp: , Rfl:   •  gabapentin (NEURONTIN) 800 MG tablet, Take 800 mg by mouth 3 (Three) Times a Day., Disp: , Rfl:   •  HYDROcodone-acetaminophen (NORCO) 7.5-325 MG per tablet, Take 1 tablet by mouth Every 8 (Eight) Hours., Disp: , Rfl:   •  insulin aspart (NovoLOG FlexPen) 100 UNIT/ML solution pen-injector sc pen, Inject 10 Units under the skin into the appropriate area as directed 3 (Three) Times a Day With Meals., Disp: , Rfl:   •  Insulin Degludec (TRESIBA FLEXTOUCH) 200 UNIT/ML solution pen-injector pen injection, Inject 66 Units under the skin into the appropriate area as directed Every Night., Disp: , Rfl:   •  isosorbide mononitrate (IMDUR) 60 MG 24 hr tablet, Take 60 mg by mouth 2 (Two) Times a Day., Disp: , Rfl:   •  linaclotide (Linzess) 145 MCG capsule capsule, Take 145 mcg by mouth Every Morning Before Breakfast., Disp: , Rfl:   •  loratadine (CLARITIN) 10 MG tablet, Take 10 mg by mouth Daily., Disp: , Rfl:   •  metFORMIN (GLUCOPHAGE) 1000 MG tablet, Take 1,000 mg by mouth 2 (two) times a day., Disp: , Rfl:   •  metoprolol tartrate (LOPRESSOR) 50 MG tablet, Take 50 mg by mouth Daily., Disp: , Rfl:   •  prasugrel (EFFIENT) 10 MG tablet, Take 10 mg by mouth Daily., Disp: , Rfl:   •  simvastatin (ZOCOR) 40 MG tablet, Take 40 mg by mouth Every Night., Disp: , Rfl:     Past Medical History:   Diagnosis Date   • Anxiety     ANIXETY ATTACK AT WORK ABOUT A MONTH AGO   • Arthritis    • Condition not found     PSYCHIATRIC PROBLEMS- PT TOOK AN OVERDOSE OF MEDICATION FOR BLOOD SUGAR. TOOK 8 PILLS.   • Condition not found     HERBS USED- BC POWDER AS NEEDED   • Congestive heart failure (CHF) (HCC)    • COPD (chronic obstructive pulmonary disease) (HCC)    • Diabetes (HCC)    • Diabetic neuropathy (HCC)    • Health care home, active care coordination     [facility]  "(FAX: [phone])   • History of anesthesia reaction     HARD TIME WAKING UP SOMETIME   • History of hiatal hernia    • History of kidney stones    • Hyperlipidemia    • Hypertension    • Irregular heartbeat     PT IS SEEING  (RECENTLY DID A 24-HOUR HEART MONITOR).   • Peripheral artery disease (HCC)    • Stroke (HCC)     PIN STROKE FROMA MIGRAINE/ 20 YEARS AGO   • Wears glasses      Past Surgical History:   Procedure Laterality Date   • ABDOMINAL SURGERY      33 HERNIA REPAIR, GALLBLADER REMOVED   • BLADDER REPAIR     • CHOLECYSTECTOMY     • DILATATION AND CURETTAGE      X2   • HERNIA REPAIR     • HYSTERECTOMY  1992    2 CS   • TOE AMPUTATION Right 02/14/2017    RIGHT GREAT TOE   • TONSILLECTOMY       Social History     Socioeconomic History   • Marital status:    Tobacco Use   • Smoking status: Former Smoker   • Smokeless tobacco: Never Used   • Tobacco comment: QUIT 4 YEARS AGO/ RECENLTY STARTED AGAIN 3 CIGS A DAY    Substance and Sexual Activity   • Alcohol use: Not Currently   • Drug use: Not Currently     Family History   Problem Relation Age of Onset   • Diabetes Mother    • Anesthesia problems Mother         HARD TIME WAKING UP SOMETIMES          Objective     Vitals:    04/14/22 1113   BP: 148/78   BP Location: Left arm   Patient Position: Sitting   Cuff Size: Adult   Pulse: 66   Temp: 96.7 °F (35.9 °C)   TempSrc: Temporal   Weight: 96.4 kg (212 lb 8.4 oz)   Height: 165.1 cm (65\")   PainSc:   4   PainLoc: Foot     Body mass index is 35.37 kg/m².    STEADI Fall Risk Assessment has not been completed.     Review of Systems     ROS:  No fevers, chills, sweats, or body aches. No shortness of breath.     I have reviewed the HPI and ROS as documented by MA/RN. Shamika Whitehead MD      Physical Exam     NAD, well dressed, well nourished  Normocephalic, atraumatic  EOMI, no scleral icterus  No respiratory distress, no cough  AAOx3, pleasant, cooperative  Ulceration right medial plantar foot is " slightly larger again, however, the distalmost wound is now completely healed.  Mild surrounding callus and no significant periwound maceration.  No erythema, induration, fluctuance.    See photo for details.                  Result Review :  The following data was reviewed by: Shamika Whitehead MD on 04/14/2022:    Prior notes and images.    Wound Avatar Documentation     Active Wound LDAs        Wound 06/29/21 1328 Right medial foot    Placement date 06/29/21   - 06/29/21 1328      Placement time 1328   - 06/29/21 1328 Days 288    Present on Hospital Admission: Y   - 06/29/21 1328 Side: Right   - 06/29/21 1328    Orientation: medial   - 06/29/21 1328 Location: foot   - 06/29/21 1328    Assessments     Row Name 04/14/22 1153 04/14/22 1130 03/15/22 1129 03/15/22 1101 02/22/22 1105                      Wound Image View All Images  View Images   - 04/14/22 1156    View Images   - 04/14/22 1132    View Images   - 03/15/22 1131    View Images   - 03/15/22 1102    View Images   - 02/22/22 1106      Pressure Injury Stage --  --  --  --  --     Dressing Appearance --  intact   - 04/14/22 1132 other (see comments)  POST DEBRIDEMENT   - 03/15/22 1131 intact   - 03/15/22 1102 moist drainage;intact   - 02/22/22 1106    Closure --  --  --  --  --     Base --  --  --  --  --     Black (%), Wound Tissue Color --  0   - 04/14/22 1132 --  0   - 03/15/22 1102 --     Red (%), Wound Tissue Color --  50   - 04/14/22 1132 --  50   - 03/15/22 1102 100   - 02/22/22 1106    Yellow (%), Wound Tissue Color --  50   - 04/14/22 1132 --  50   - 03/15/22 1102 --     Periwound --  intact   - 04/14/22 1132 --  intact   - 03/15/22 1102 intact   - 02/22/22 1106    Periwound Temperature --  warm   -MADHAVI 04/14/22 1132 --  warm   -MADHAVI 03/15/22 1102 warm   -BA 02/22/22 1106    Periwound Skin Turgor --  soft   -MADHAVI 04/14/22 1132 --  soft   -MADHAVI 03/15/22 1102 firm   -BA 02/22/22 1106    Edges --  open   -MADHAVI  04/14/22 1132 --  callused;open   - 03/15/22 1102 callused   - 02/22/22 1106    Wound Length (cm) 1.7 cm  post debridement   - 04/14/22 1156 1.7 cm   - 04/14/22 1132 1.4 cm   - 03/15/22 1131 1.1 cm   - 03/15/22 1102 2.5 cm   - 02/22/22 1106    Wound Width (cm) 1.5 cm  post debridement   - 04/14/22 1156 1.5 cm   - 04/14/22 1132 1.4 cm   - 03/15/22 1131 1.9 cm   - 03/15/22 1102 1.8 cm   - 02/22/22 1106    Wound Depth (cm) 0.9 cm  post debridement   - 04/14/22 1156 0.9 cm   - 04/14/22 1132 0.4 cm   - 03/15/22 1131 0.5 cm   - 03/15/22 1102 0.4 cm   - 02/22/22 1106    Wound Surface Area (cm^2) 2.55 cm^2   - 04/14/22 1156 2.55 cm^2   - 04/14/22 1132 1.96 cm^2   - 03/15/22 1131 2.09 cm^2   - 03/15/22 1102 --     Wound Volume (cm^3) 2.295 cm^3   - 04/14/22 1156 2.295 cm^3   - 04/14/22 1132 0.784 cm^3   - 03/15/22 1131 1.045 cm^3   - 03/15/22 1102 --     Tunneling [Depth (cm)/Location] --  0   - 04/14/22 1132 0   - 03/15/22 1131 0   - 03/15/22 1102 --     Undermining [Depth (cm)/Location] --  0   - 04/14/22 1132 0   - 03/15/22 1131 0   - 03/15/22 1102 --     Drainage Characteristics/Odor bleeding controlled   - 04/14/22 1156 serous;serosanguineous   - 04/14/22 1132 bleeding controlled;other (see comments)  POST DEBRIDEMENT   - 03/15/22 1131 serous;serosanguineous   - 03/15/22 1102 serosanguineous   - 02/22/22 1106    Drainage Amount none   - 04/14/22 1156 moderate   - 04/14/22 1132 --  moderate   - 03/15/22 1102 moderate   - 02/22/22 1106    Care, Wound --  cleansed with;sterile normal saline   - 04/14/22 1132 cleansed with;sterile normal saline   - 03/15/22 1131 cleansed with;sterile normal saline   - 03/15/22 1102 cleansed with;sterile normal saline   - 02/22/22 1106    Dressing Care dressing applied;other (see comments)  polymem, rolled gauze, tape   - 04/14/22 1158 --  dressing applied;other (see comments)  MONICA AG, MEPILEX   -MADHAVI  03/15/22 1131 --  dressing applied;border dressing;collagen;silver impregnated;other (see comments)  MONICA AG, OPTIFOAM   -BA 02/22/22 1140    Periwound Care --  --  dry periwound area maintained   - 03/15/22 1131 --  --     Adhesive Closure Strips --  --  --  --  --     Number of Adhesive Closure Strips --  --  --  --  --     Sutures Removed Intact --  --  --  --  --     Number of Sutures Removed --  --  --  --  --     Staples Removed Intact --  --  --  --  --     Number of Staples Removed --  --  --  --  --     Wound Output (mL) --  --  --  --  --     Row Name 01/19/22 1513 01/19/22 0815 01/17/22 2200 01/17/22 2030 01/17/22 1900                      Wound Image View All Images  View Images   - 01/19/22 1532   --  --  --  --     Pressure Injury Stage --  --  --  --  --     Dressing Appearance --  dry;intact   - 01/19/22 1136 dry;intact   - 01/17/22 2211 moist drainage   -MB 01/17/22 2129 dry;intact   - 01/17/22 1930    Closure --  --  --  --  Adhesive bandage   - 01/17/22 1930    Base --  dressing in place, unable to visualize   - 01/19/22 1136 --  pink;moist   -MB 01/17/22 2129 yellow;moist   - 01/17/22 1930    Black (%), Wound Tissue Color --  --  --  --  --     Red (%), Wound Tissue Color --  --  --  --  --     Yellow (%), Wound Tissue Color --  --  --  --  --     Periwound --  --  --  pink;edematous   -MB 01/17/22 2129 warm;moist   - 01/17/22 1930    Periwound Temperature --  --  --  --  warm   - 01/17/22 1930    Periwound Skin Turgor --  --  --  --  firm   - 01/17/22 1930    Edges --  --  --  --  open   - 01/17/22 1930    Wound Length (cm) --  --  --  2.5 cm   -MB 01/17/22 2129 --     Wound Width (cm) --  --  --  3 cm   -MB 01/17/22 2129 --     Wound Depth (cm) --  --  --  0.5 cm   -MB 01/17/22 2129 --     Wound Surface Area (cm^2) --  --  --  --  --     Wound Volume (cm^3) --  --  --  --  --     Tunneling [Depth (cm)/Location] --  --  --  --  --     Undermining [Depth (cm)/Location]  --  --  --  --  --     Drainage Characteristics/Odor --  --  --  serous   -MB 01/17/22 2129 yellow   - 01/17/22 1930    Drainage Amount --  --  --  small   -MB 01/17/22 2129 scant   - 01/17/22 1930    Care, Wound --  --  --  irrigated with;cleansed with;sterile normal saline   -MB 01/17/22 2129 cleansed with;sterile normal saline;moist wound environment maintained;silver agent applied   - 01/17/22 1930    Dressing Care --  --  --  dressing applied;dressing changed;hydrofiber;silver impregnated;silicone   -MB 01/17/22 2129 dressing changed;other (see comments)  mepilex   - 01/17/22 1930    Periwound Care --  --  --  --  --     Adhesive Closure Strips --  --  --  --  --     Number of Adhesive Closure Strips --  --  --  --  --     Sutures Removed Intact --  --  --  --  --     Number of Sutures Removed --  --  --  --  --     Staples Removed Intact --  --  --  --  --     Number of Staples Removed --  --  --  --  --     Wound Output (mL) --  --  --  --  --     Row Name 01/16/22 0822 01/16/22 0100 01/15/22 2100 01/15/22 0815 01/15/22 0355                      Wound Image View All Images --  --  --  --   View Images   - 01/15/22 0400      Pressure Injury Stage --  --  --  --  --     Dressing Appearance intact;dry   - 01/16/22 1310 moist drainage   - 01/16/22 0819 intact   - 01/15/22 2348 intact   - 01/15/22 1549 dry;intact   - 01/15/22 0400    Closure --  --  --  --  --     Base --  moist;pink   - 01/16/22 0819 --  --  --     Black (%), Wound Tissue Color --  --  --  --  --     Red (%), Wound Tissue Color --  --  --  --  --     Yellow (%), Wound Tissue Color --  --  --  --  --     Periwound --  pink;warm;redness   - 01/16/22 0819 --  --  pink   - 01/15/22 0400    Periwound Temperature --  warm   - 01/16/22 0819 --  --  warm   - 01/15/22 0400    Periwound Skin Turgor --  firm   - 01/16/22 0819 --  --  firm   - 01/15/22 0400    Edges --  callused   - 01/16/22 0819 --  --  callused   -  01/15/22 0400    Wound Length (cm) --  3 cm   - 01/16/22 0819 --  --  --     Wound Width (cm) --  2 cm   - 01/16/22 0819 --  --  --     Wound Depth (cm) --  0.3 cm   - 01/16/22 0819 --  --  --     Wound Surface Area (cm^2) --  --  --  --  --     Wound Volume (cm^3) --  --  --  --  --     Tunneling [Depth (cm)/Location] --  --  --  --  --     Undermining [Depth (cm)/Location] --  --  --  --  --     Drainage Characteristics/Odor --  --  --  --  other (see comments)   - 01/15/22 0400    Drainage Amount --  small   - 01/16/22 0819 --  --  none   - 01/15/22 0400    Care, Wound --  cleansed with;sterile normal saline   - 01/16/22 0819 --  --  cleansed with;sterile normal saline   - 01/15/22 0400    Dressing Care --  dressing changed  Aquacel AG applied moistened with saline and guaze applied and roll guaze.   - 01/16/22 0819 --  --  dressing changed  aquacel AG   - 01/15/22 0400    Periwound Care --  barrier film applied   - 01/16/22 0819 --  --  barrier ointment applied   - 01/15/22 0400    Adhesive Closure Strips --  --  --  --  --     Number of Adhesive Closure Strips --  --  --  --  --     Sutures Removed Intact --  --  --  --  --     Number of Sutures Removed --  --  --  --  --     Staples Removed Intact --  --  --  --  --     Number of Staples Removed --  --  --  --  --     Wound Output (mL) --  --  --  --  --     Row Name 01/06/22 0937 01/06/22 0839 11/29/21 1120 11/29/21 1041 11/05/21 1100                      Wound Image View All Images  View Images   - 01/06/22 0938    View Images   - 01/06/22 0841    View Images   - 11/29/21 1121    View Images   - 11/29/21 1045   --     Pressure Injury Stage --  --  --  --  --     Dressing Appearance other (see comments)  post debridement   - 01/06/22 0938 intact;moist drainage   - 01/06/22 0841 other (see comments)  POST DEBRIDEMENT   - 11/29/21 1121 moist drainage;intact   - 11/29/21 1045 --     Closure --  --  --  --  --     Base --   --  --  --  --     Black (%), Wound Tissue Color 0   - 01/06/22 0938 0   - 01/06/22 0841 0   - 11/29/21 1121 0   - 11/29/21 1045 --     Red (%), Wound Tissue Color 100   - 01/06/22 0938 100   -MADHAVI 01/06/22 0841 100   - 11/29/21 1121 100   - 11/29/21 1045 --     Yellow (%), Wound Tissue Color 0   - 01/06/22 0938 0   - 01/06/22 0841 0   - 11/29/21 1121 0   - 11/29/21 1045 --     Periwound intact   - 01/06/22 0938 intact   - 01/06/22 0841 intact   - 11/29/21 1121 macerated   - 11/29/21 1045 --     Periwound Temperature warm   - 01/06/22 0938 warm   - 01/06/22 0841 warm   - 11/29/21 1121 warm   - 11/29/21 1045 --     Periwound Skin Turgor soft   - 01/06/22 0938 soft   - 01/06/22 0841 soft   - 11/29/21 1121 soft   - 11/29/21 1045 --     Edges open   - 01/06/22 0938 callused;open   - 01/06/22 0841 open   - 11/29/21 1121 open   - 11/29/21 1045 --     Wound Length (cm) 3 cm   - 01/06/22 0938 3 cm   - 01/06/22 0841 3.3 cm   - 11/29/21 1121 2.7 cm   - 11/29/21 1045 2.6 cm  POST DEBRIDEMENT   - 11/05/21 1138    Wound Width (cm) 2.1 cm   - 01/06/22 0938 2.1 cm   - 01/06/22 0841 1.7 cm   - 11/29/21 1121 1.6 cm   - 11/29/21 1045 1.6 cm  POST DEBRIDEMENT   -BA 11/05/21 1138    Wound Depth (cm) 0.3 cm   - 01/06/22 0938 0.5 cm   - 01/06/22 0939 0.9 cm   - 11/29/21 1121 1 cm   - 11/29/21 1045 0.4 cm  POST DEBRIDEMENT   - 11/05/21 1138    Wound Surface Area (cm^2) --  --  --  --  --     Wound Volume (cm^3) --  --  --  --  --     Tunneling [Depth (cm)/Location] 0   - 01/06/22 0938 0   - 01/06/22 0841 0   - 11/29/21 1121 0   - 11/29/21 1045 --     Undermining [Depth (cm)/Location] 0   - 01/06/22 0938 0   - 01/06/22 0841 0   - 11/29/21 1121 1.1 from 1 to 5 O'CLOCK (1.8 at 3:00)   - 11/29/21 1045 --     Drainage Characteristics/Odor bleeding controlled;other (see comments)  post debridement   - 01/06/22 0938 serous;serosanguineous   -  01/06/22 0841 other (see comments)  POST DEBRIDEMENT   - 11/29/21 1121 serous;serosanguineous;malodorous   - 11/29/21 1045 --     Drainage Amount --  moderate   - 01/06/22 0841 --  moderate   - 11/29/21 1045 --     Care, Wound cleansed with;sterile normal saline   - 01/06/22 0938 cleansed with;sterile normal saline   - 01/06/22 0841 cleansed with;sterile normal saline   - 11/29/21 1121 cleansed with;sterile normal saline   - 11/29/21 1045 cleansed with;sterile normal saline   - 11/05/21 1138    Dressing Care dressing applied;other (see comments)  aquacel ag, mepilex   - 01/06/22 0938 --  dressing applied;other (see comments)  POLYMEM SILVER, MEPILEX   - 11/29/21 1121 --  dressing applied;hydrofiber;border dressing;other (see comments)  OPTICELL AG, GAUZE, MEPILEX   - 11/05/21 1138    Periwound Care dry periwound area maintained   - 01/06/22 0938 --  dry periwound area maintained   - 11/29/21 1121 --  --     Adhesive Closure Strips --  --  --  --  --     Number of Adhesive Closure Strips --  --  --  --  --     Sutures Removed Intact --  --  --  --  --     Number of Sutures Removed --  --  --  --  --     Staples Removed Intact --  --  --  --  --     Number of Staples Removed --  --  --  --  --     Wound Output (mL) --  --  --  --  --     Row Name 11/05/21 1045 10/22/21 1134 10/22/21 1100 10/08/21 1429 10/08/21 1342                      Wound Image View All Images  View Images   - 11/05/21 1046   --   View Images   - 10/22/21 1102    View Images   - 10/08/21 1431    View Images   - 10/08/21 1343      Pressure Injury Stage --  --  --  --  --     Dressing Appearance intact;moist drainage   - 11/05/21 1046 --  intact;moist drainage   - 10/22/21 1102 other (see comments)  POST DEBRIDEMENT   - 10/08/21 1431 moist drainage;intact   - 10/08/21 1343    Closure --  --  --  --  --     Base --  --  --  --  --     Black (%), Wound Tissue Color --  --  --  0   - 10/08/21 1431 --     Red  (%), Wound Tissue Color 25   - 11/05/21 1046 --  75   - 10/22/21 1102 100   -MADHAVI 10/08/21 1431 25   - 10/08/21 1343    Yellow (%), Wound Tissue Color 75   - 11/05/21 1046 --  25   - 10/22/21 1102 0   -MADHAVI 10/08/21 1431 75   - 10/08/21 1343    Periwound intact;blanchable   - 11/05/21 1046 --  intact   - 10/22/21 1102 intact   - 10/08/21 1431 --     Periwound Temperature warm   - 11/05/21 1046 --  warm   - 10/22/21 1102 warm   - 10/08/21 1431 warm   - 10/08/21 1343    Periwound Skin Turgor soft   - 11/05/21 1046 --  soft   - 10/22/21 1102 soft   - 10/08/21 1431 soft   - 10/08/21 1343    Edges open;callused   - 11/05/21 1046 --  open   - 10/22/21 1102 open   - 10/08/21 1431 callused;open   - 10/08/21 1343    Wound Length (cm) 2.5 cm   - 11/05/21 1046 2.8 cm (P)   POST DEBRIDEMENT   - 10/22/21 1136 2.8 cm   - 10/22/21 1102 3.6 cm   - 10/08/21 1431 1.2 cm   - 10/08/21 1343    Wound Width (cm) 1.4 cm   - 11/05/21 1046 2.3 cm (P)   POST DEBRIDEMENT   - 10/22/21 1136 1 cm   - 10/22/21 1102 2.1 cm   - 10/08/21 1431 1 cm   - 10/08/21 1343    Wound Depth (cm) 0.6 cm   - 11/05/21 1046 0.6 cm (P)   POST DEBRIDEMENT   - 10/22/21 1136 0.8 cm   - 10/22/21 1102 0.3 cm   - 10/08/21 1431 1.8 cm   - 10/08/21 1343    Wound Surface Area (cm^2) --  --  --  --  --     Wound Volume (cm^3) --  --  --  --  --     Tunneling [Depth (cm)/Location] --  --  --  0   - 10/08/21 1431 --     Undermining [Depth (cm)/Location] --  --  --  0   - 10/08/21 1431 1.0 AT 6 OCLOCK   - 10/08/21 1343    Drainage Characteristics/Odor sanguineous;serous;malodorous   - 11/05/21 1046 --  serous;serosanguineous;malodorous   - 10/22/21 1102 bleeding controlled;other (see comments)  POST DEBRIDEMENT   - 10/08/21 1431 serous;purulent;malodorous   - 10/08/21 1343    Drainage Amount moderate   - 11/05/21 1046 --  moderate   - 10/22/21 1102 --  moderate   - 10/08/21 1343    Care,  Wound cleansed with;sterile normal saline   - 11/05/21 1046 debrided;silver agent applied;cleansed with;sterile normal saline (P)   DEBRIDED & SILVER NITRATE APPLIED BY MD   - 10/22/21 1136 cleansed with;sterile normal saline   - 10/22/21 1102 cleansed with;sterile normal saline   - 10/08/21 1431 cleansed with;irrigated with;sterile normal saline   - 10/08/21 1343    Dressing Care --  dressing applied;border dressing;hydrofiber;silver impregnated (P)    - 10/22/21 1136 --  dressing applied;other (see comments)  SILVER NITRATE(APPLIED BY MD), GENTAMICIN, MEPILEX   - 10/08/21 1431 --     Periwound Care --  --  --  dry periwound area maintained   - 10/08/21 1431 --     Adhesive Closure Strips --  --  --  --  --     Number of Adhesive Closure Strips --  --  --  --  --     Sutures Removed Intact --  --  --  --  --     Number of Sutures Removed --  --  --  --  --     Staples Removed Intact --  --  --  --  --     Number of Staples Removed --  --  --  --  --     Wound Output (mL) --  --  --  --  --     Row Name 09/17/21 1043 09/17/21 1029 08/23/21 1155 08/23/21 1057 07/26/21 1139                      Wound Image View All Images  View Images   - 09/17/21 1045    View Images   - 09/17/21 1030    View Images   - 08/23/21 1157    View Images   - 08/23/21 1059    View Images   - 07/26/21 1142      Pressure Injury Stage --  --  --  --  --     Dressing Appearance other (see comments)  post debridement   - 09/17/21 1045 moist drainage;intact   - 09/17/21 1030 other (see comments)  POST DEBRIDEMENT   - 08/23/21 1157 moist drainage;intact   - 08/23/21 1059 moist drainage;intact   - 07/26/21 1142    Closure --  --  --  --  --     Base --  --  --  --  --     Black (%), Wound Tissue Color 0   - 09/17/21 1045 0   - 09/17/21 1030 0   - 08/23/21 1157 0   - 08/23/21 1059 --  POST DEBRIDEMENT: SILVER NITRATE APPLIED BY MD   - 07/26/21 1142    Red (%), Wound Tissue Color 100   - 09/17/21 1045 25    - 09/17/21 1030 100   -MADHAVI 08/23/21 1157 75   -MADHAVI 08/23/21 1059 --  POST DEBRIDEMENT: SILVER NITRATE APPLIED BY MD   - 07/26/21 1142    Yellow (%), Wound Tissue Color 0   -MADHAVI 09/17/21 1045 --  0   -MADHAVI 09/17/21 1030 0   -MADHAVI 08/23/21 1157 25   -MADHAVI 08/23/21 1059 --  POST DEBRIDEMENT: SILVER NITRATE APPLIED BY MD   - 07/26/21 1142    Periwound intact   -MADHAVI 09/17/21 1045 intact;dry   -MADHAVI 09/17/21 1030 intact   - 08/23/21 1157 intact;dry   -MADHAVI 08/23/21 1059 intact   - 07/26/21 1142    Periwound Temperature warm   - 09/17/21 1045 warm   - 09/17/21 1030 warm   - 08/23/21 1157 warm   - 08/23/21 1059 warm   - 07/26/21 1142    Periwound Skin Turgor soft   - 09/17/21 1045 firm   - 09/17/21 1030 soft   -MADHAVI 08/23/21 1157 firm   -MADHAVI 08/23/21 1059 soft   -MADHAVI 07/26/21 1142    Edges open   - 09/17/21 1045 callused;open   -MADHAVI 09/17/21 1030 open   -MADHAVI 08/23/21 1157 callused;open   - 08/23/21 1059 open   -MADHAVI 07/26/21 1142    Wound Length (cm) 1.5 cm   -MADHAVI 09/17/21 1045 1.7 cm   -MADHAVI 09/17/21 1030 1.5 cm   -MADHAVI 08/23/21 1157 1.6 cm   -MADHAVI 08/23/21 1059 1.7 cm   -MADHAVI 07/26/21 1142    Wound Width (cm) 1 cm   -MADHAVI 09/17/21 1045 1 cm   -MADHAVI 09/17/21 1030 1.3 cm   -MADHAVI 08/23/21 1157 1.3 cm   -MADHAVI 08/23/21 1059 1.7 cm   -MADHAVI 07/26/21 1142    Wound Depth (cm) 0.2 cm   -MADHAVI 09/17/21 1045 0.4 cm   -MADHAVI 09/17/21 1030 0.2 cm   - 08/23/21 1157 0.2 cm   - 08/23/21 1059 0.2 cm   - 07/26/21 1142    Wound Surface Area (cm^2) --  --  --  --  --     Wound Volume (cm^3) --  --  --  --  --     Tunneling [Depth (cm)/Location] 0   - 09/17/21 1045 0   - 09/17/21 1030 0   - 08/23/21 1157 0   - 08/23/21 1059 0   - 07/26/21 1142    Undermining [Depth (cm)/Location] 0   - 09/17/21 1045 0   - 09/17/21 1030 0   - 08/23/21 1157 0   - 08/23/21 1059 0   - 07/26/21 1142    Drainage Characteristics/Odor bleeding controlled;other (see comments)  post debridement   - 09/17/21 1045 serous;serosanguineous   - 09/17/21 1030  sanguineous   - 08/23/21 1157 serous;serosanguineous   - 08/23/21 1059 bleeding controlled   - 07/26/21 1142    Drainage Amount --  moderate   - 09/17/21 1030 small   - 08/23/21 1157 moderate   - 08/23/21 1059 none   - 07/26/21 1142    Care, Wound cleansed with;sterile normal saline   - 09/17/21 1045 cleansed with;sterile normal saline   - 09/17/21 1030 cleansed with;sterile normal saline   - 08/23/21 1157 cleansed with;sterile normal saline   - 08/23/21 1059 cleansed with;sterile normal saline   - 07/26/21 1142    Dressing Care dressing applied;other (see comments)  SILVER NITRATE APPLIED BY MD, [place], MEPILEX   - 09/17/21 1045 --  dressing applied;other (see comments)  AQUACEL AG, MEPILEX   - 08/23/21 1157 --  dressing applied;other (see comments)  SILVER NITRATE APPLIED BY MD, Fitz Lodge, GAUZE, OPTIFOAM   - 07/26/21 1142    Periwound Care dry periwound area maintained   - 09/17/21 1045 --  dry periwound area maintained   - 08/23/21 1157 --  dry periwound area maintained   - 07/26/21 1142    Adhesive Closure Strips --  --  --  --  --     Number of Adhesive Closure Strips --  --  --  --  --     Sutures Removed Intact --  --  --  --  --     Number of Sutures Removed --  --  --  --  --     Staples Removed Intact --  --  --  --  --     Number of Staples Removed --  --  --  --  --     Wound Output (mL) --  --  --  --  --     Row Name 07/26/21 1042 06/29/21 1340 06/29/21 1329                  Wound Image View All Images  View Images   - 07/26/21 1049    View Images   - 06/29/21 1342    View Images   - 06/29/21 1331      Pressure Injury Stage --  --  --     Dressing Appearance moist drainage;intact   - 07/26/21 1049 --  moist drainage;intact   - 06/29/21 1331    Closure --  --  --     Base --  --  --     Black (%), Wound Tissue Color 0   - 07/26/21 1049 --  0   - 06/29/21 1331    Red (%), Wound Tissue Color 100   - 07/26/21 1049 100   - 06/29/21 1342 50   -MADHAVI  06/29/21 1331    Yellow (%), Wound Tissue Color 0   - 07/26/21 1049 --  0   - 06/29/21 1331    Periwound dry;intact   - 07/26/21 1049 --  intact   - 06/29/21 1331    Periwound Temperature warm   - 07/26/21 1049 warm   - 06/29/21 1342 warm   - 06/29/21 1331    Periwound Skin Turgor firm   - 07/26/21 1049 firm   - 06/29/21 1342 firm   - 06/29/21 1331    Edges open   - 07/26/21 1049 callused   - 06/29/21 1342 callused   - 06/29/21 1331    Wound Length (cm) 1.6 cm   - 07/26/21 1049 2 cm   - 06/29/21 1342 2 cm   - 06/29/21 1331    Wound Width (cm) 1.9 cm   - 07/26/21 1049 2 cm   - 06/29/21 1342 2 cm   - 06/29/21 1331    Wound Depth (cm) 0.3 cm   - 07/26/21 1049 0.2 cm   - 06/29/21 1342 0.4 cm   - 06/29/21 1331    Wound Surface Area (cm^2) --  --  --     Wound Volume (cm^3) --  --  --     Tunneling [Depth (cm)/Location] 0   - 07/26/21 1049 --  0   - 06/29/21 1331    Undermining [Depth (cm)/Location] 0   - 07/26/21 1049 --  0   - 06/29/21 1331    Drainage Characteristics/Odor serous;serosanguineous   - 07/26/21 1049 bleeding controlled   - 06/29/21 1342 serous   - 06/29/21 1331    Drainage Amount moderate   - 07/26/21 1049 --  moderate   - 06/29/21 1331    Care, Wound cleansed with;sterile normal saline   - 07/26/21 1049 cleansed with;sterile normal saline   - 06/29/21 1342 cleansed with;sterile normal saline   - 06/29/21 1331    Dressing Care --  dressing applied;hydrofiber;silver impregnated;gauze   - 06/29/21 1342 --     Periwound Care --  --  --     Adhesive Closure Strips --  --  --     Number of Adhesive Closure Strips --  --  --     Sutures Removed Intact --  --  --     Number of Sutures Removed --  --  --     Staples Removed Intact --  --  --     Number of Staples Removed --  --  --     Wound Output (mL) --  --  --                 User Key  (r) = Recorded By, (t) = Taken By, (c) = Cosigned By    Initials Name Effective Dates Provider Type  Discipline    BB Audra So, RN 06/16/21 -  Registered Nurse Nurse    Joel Munoz, RN 06/16/21 -  Registered Nurse Nurse    Vicenta Montez, RN 06/16/21 -  Registered Nurse Nurse    Shabana James, RN 06/16/21 -  Registered Nurse Nurse    Haleigh Springer, RN 06/16/21 -  Registered Nurse Nurse    Audra Henderson, RN 06/09/21 -  Registered Nurse Nurse    Zara Hemphill, RN 06/09/21 -  Registered Nurse --    Kristen Canales, RN 06/08/21 -  Registered Nurse Nurse    Zonia Boyd, RNA 12/14/21 - 01/20/22 Registered Nurse Nurse                Wound debridement  Performed by: Shamika Whitehead MD  Authorized by: Shamika Whitehead MD     Correct patient: Identification verified by two methods:     Verbally and Date of birth  Correct procedure/consent    Correct site/side    Correct equipment as applicable    How many wounds are you performing a debridement on?:  1  Wound 1:     Nursing Documentation:     Wound Location:  Right medial foot  Measurements:     The total amount debrided on this wound was under 20 cm2.     Provider Documentation    Debridement type:  Sharp/excisional    Betadine      Other:  10 blade     None    Tissue debrided:  Small    Type tissue:  Slough    Level removed:  Subcutaneous    Patient tolerance:  Good    Hemostasis achieved by:  Silver nitrate    Wound Bed Post Debridement: See Photo                   Assessment and Plan   Diagnoses and all orders for this visit:    1. Diabetic ulcer of right midfoot associated with type 2 diabetes mellitus, with fat layer exposed (HCC) (Primary)    2. Diabetic polyneuropathy associated with type 2 diabetes mellitus (HCC)    Other orders  -     Wound debridement        Switch back to PolyMem silver cut to fit down into the wound, changed daily.    I continue to strongly advised offloading but the patient says she does not walk on it very much and that she cannot afford to get an offloading shoe due to being on a fixed income.   Her son arrange something to go in her shoe to try and cut out an area around the wound but she says her home health nurse told her it was not helping and to stop using it.    We will reach out to the [employer] company who will start supplying us with devices and see if the patient's insurance would be able to cover a postop shoe with PEG insert.    Recheck 4 weeks.    Patient was given instructions and counseling regarding their condition or for health maintenance advice, as well as the wound care plan and recommendations. They understand and agree with the plan.  They will follow back up here in the clinic but are instructed to contact us in the interim should they have any new, returning, or worsening symptoms or concerns. Please see specific information pulled into the AVS if appropriate.       Follow Up   Return in about 4 weeks (around 5/12/2022) for Recheck.      Shamika Whitehead MD

## 2022-04-26 ENCOUNTER — TELEPHONE (OUTPATIENT)
Dept: WOUND CARE | Facility: HOSPITAL | Age: 57
End: 2022-04-26

## 2022-04-26 NOTE — TELEPHONE ENCOUNTER
Spoke in detail about needing an off loading shoe for right plantar foot diabetic ulcer . Scrpit needed for Felipe front off loading shoe, make an appt , be evaluated, and will run this through her insurance.

## 2022-04-27 ENCOUNTER — TELEPHONE (OUTPATIENT)
Dept: WOUND CARE | Facility: HOSPITAL | Age: 57
End: 2022-04-27

## 2022-04-27 NOTE — TELEPHONE ENCOUNTER
Spoke to Iris, gave ins info and scheduled appt for Tuesday, May 1oth at 2 pm, no co-pay per Iris, faxed script to [phone]

## 2022-04-27 NOTE — TELEPHONE ENCOUNTER
Scheduled appt with [facility], no co-pay for office visit, appt is Tuesday, May 10th at 2 pm. Bring insurance cards and ID, script has been faxed to [phone].

## 2022-06-15 ENCOUNTER — OFFICE VISIT (OUTPATIENT)
Dept: WOUND CARE | Facility: HOSPITAL | Age: 57
End: 2022-06-15

## 2022-06-15 VITALS
HEART RATE: 79 BPM | DIASTOLIC BLOOD PRESSURE: 68 MMHG | SYSTOLIC BLOOD PRESSURE: 140 MMHG | TEMPERATURE: 95.9 F | RESPIRATION RATE: 16 BRPM

## 2022-06-15 DIAGNOSIS — L97.412 DIABETIC ULCER OF RIGHT MIDFOOT ASSOCIATED WITH TYPE 2 DIABETES MELLITUS, WITH FAT LAYER EXPOSED: Primary | ICD-10-CM

## 2022-06-15 DIAGNOSIS — E11.621 DIABETIC ULCER OF RIGHT MIDFOOT ASSOCIATED WITH TYPE 2 DIABETES MELLITUS, WITH FAT LAYER EXPOSED: Primary | ICD-10-CM

## 2022-06-15 DIAGNOSIS — E11.42 DIABETIC POLYNEUROPATHY ASSOCIATED WITH TYPE 2 DIABETES MELLITUS: ICD-10-CM

## 2022-06-15 PROCEDURE — 99213 OFFICE O/P EST LOW 20 MIN: CPT | Performed by: EMERGENCY MEDICINE

## 2022-06-15 PROCEDURE — G0463 HOSPITAL OUTPT CLINIC VISIT: HCPCS | Performed by: EMERGENCY MEDICINE

## 2022-06-15 NOTE — PROGRESS NOTES
Chief Complaint  Wound Check (WOUND CHECK TO RIGHT FOOT ( BS 82)/)    Subjective        Wound Check        Nasima Dubose a very pleasant 57 y.o. female who has been followed by the wound care clinic for a RLE diabetic foot ulcer.  She has been applying Aquacel Ag to this area.  Severity of the wound has somewhat waxed and waned. Her diabetes was previously well controlled with her most recent hemoglobin A1c in our system from April 2021 being 6.5.  However, more recent BMP and fingerstick glucose measurements have been mostly in the 200s.  She also has a history of peripheral vascular disease and has been seen by Dr. Henson in the past.    She has been using PolyMem silver to the wound on a daily basis.  She was discharged from home health yesterday.  She says she feels like Sharmila worked better for her wound.  She now has a wedge offloading shoe and says she can really tell a difference.  She got it about 3 weeks ago and has been wearing it from the time she gets up to the time she goes to bed.  No significant drainage.    Allergies:  Aminoglycosides, Neosporin [neomycin-bacitracin zn-polymyx], Polymyxin b, and Pramoxine      Current Outpatient Medications:   •  albuterol sulfate HFA (ProAir HFA) 108 (90 Base) MCG/ACT inhaler, Inhale 2 puffs Every 4 (Four) Hours As Needed., Disp: , Rfl:   •  Ascorbic Acid (Vitamin C) 500 MG capsule, Take 500 mg by mouth Daily., Disp: , Rfl:   •  Aspirin 81 MG capsule, Take 81 mg by mouth Daily., Disp: , Rfl:   •  B-D ULTRAFINE III SHORT PEN 31G X 8 MM misc, USE AS DIRECTED INTRAMUCULARLY FOUR TIMES DAILY FOR 90 DAYS, Disp: , Rfl:   •  benzonatate (TESSALON) 200 MG capsule, Take 1 capsule by mouth 3 (Three) Times a Day As Needed for Cough for up to 30 doses., Disp: 30 capsule, Rfl: 0  •  furosemide (LASIX) 40 MG tablet, Take 40 mg by mouth 2 (Two) Times a Day., Disp: , Rfl:   •  gabapentin (NEURONTIN) 800 MG tablet, Take 800 mg by mouth 3 (Three) Times a Day., Disp: , Rfl:   •   HYDROcodone-acetaminophen (NORCO) 7.5-325 MG per tablet, Take 1 tablet by mouth Every 8 (Eight) Hours., Disp: , Rfl:   •  insulin aspart (NovoLOG FlexPen) 100 UNIT/ML solution pen-injector sc pen, Inject 10 Units under the skin into the appropriate area as directed 3 (Three) Times a Day With Meals., Disp: , Rfl:   •  Insulin Degludec (TRESIBA FLEXTOUCH) 200 UNIT/ML solution pen-injector pen injection, Inject 66 Units under the skin into the appropriate area as directed Every Night., Disp: , Rfl:   •  isosorbide mononitrate (IMDUR) 60 MG 24 hr tablet, Take 60 mg by mouth 2 (Two) Times a Day., Disp: , Rfl:   •  linaclotide (Linzess) 145 MCG capsule capsule, Take 145 mcg by mouth Every Morning Before Breakfast., Disp: , Rfl:   •  loratadine (CLARITIN) 10 MG tablet, Take 10 mg by mouth Daily., Disp: , Rfl:   •  metFORMIN (GLUCOPHAGE) 1000 MG tablet, Take 1,000 mg by mouth 2 (two) times a day., Disp: , Rfl:   •  metoprolol tartrate (LOPRESSOR) 50 MG tablet, Take 50 mg by mouth Daily., Disp: , Rfl:   •  prasugrel (EFFIENT) 10 MG tablet, Take 10 mg by mouth Daily., Disp: , Rfl:   •  simvastatin (ZOCOR) 40 MG tablet, Take 40 mg by mouth Every Night., Disp: , Rfl:     Past Medical History:   Diagnosis Date   • Anxiety     ANIXETY ATTACK AT WORK ABOUT A MONTH AGO   • Arthritis    • Condition not found     PSYCHIATRIC PROBLEMS- PT TOOK AN OVERDOSE OF MEDICATION FOR BLOOD SUGAR. TOOK 8 PILLS.   • Condition not found     HERBS USED- BC POWDER AS NEEDED   • Congestive heart failure (CHF) (HCC)    • COPD (chronic obstructive pulmonary disease) (HCC)    • Diabetes (HCC)    • Diabetic neuropathy (HCC)    • Health care home, active care coordination     [facility] (FAX: [phone])   • History of anesthesia reaction     HARD TIME WAKING UP SOMETIME   • History of hiatal hernia    • History of kidney stones    • Hyperlipidemia    • Hypertension    • Irregular heartbeat     PT IS SEEING  (RECENTLY DID A 24-HOUR HEART  MONITOR).   • Peripheral artery disease (HCC)    • Stroke (HCC)     PIN STROKE FROMA MIGRAINE/ 20 YEARS AGO   • Wears glasses      Past Surgical History:   Procedure Laterality Date   • ABDOMINAL SURGERY      33 HERNIA REPAIR, GALLBLADER REMOVED   • BLADDER REPAIR     • CHOLECYSTECTOMY     • DILATATION AND CURETTAGE      X2   • HERNIA REPAIR     • HYSTERECTOMY  1992    2 CS   • TOE AMPUTATION Right 02/14/2017    RIGHT GREAT TOE   • TONSILLECTOMY       Social History     Socioeconomic History   • Marital status:    Tobacco Use   • Smoking status: Former Smoker   • Smokeless tobacco: Never Used   • Tobacco comment: QUIT 4 YEARS AGO/ RECENLTY STARTED AGAIN 3 CIGS A DAY    Substance and Sexual Activity   • Alcohol use: Not Currently   • Drug use: Not Currently     Family History   Problem Relation Age of Onset   • Diabetes Mother    • Anesthesia problems Mother         HARD TIME WAKING UP SOMETIMES          Objective     Vitals:    06/15/22 1433   BP: 140/68   BP Location: Left arm   Patient Position: Sitting   Pulse: 79   Resp: 16   Temp: 95.9 °F (35.5 °C)   TempSrc: Temporal   PainSc: 0-No pain     There is no height or weight on file to calculate BMI.    STEADI Fall Risk Assessment has not been completed.     Review of Systems     ROS:  No fevers, chills, sweats, or body aches. No shortness of breath.     I have reviewed the HPI and ROS as documented by MA/RN. Shamika Whitehead MD    Physical Exam     NAD, well dressed, well nourished  Normocephalic, atraumatic  EOMI, no scleral icterus  No respiratory distress, no cough  AAOx3, pleasant, cooperative  Ulceration right medial plantar foot is improving.  No significant surrounding callus and only minimal periwound maceration.  No erythema, induration, fluctuance.    See photo for details.            Result Review :  The following data was reviewed by: Shamika Whitehead MD on 06/15/2022:    Prior notes and images.                      Assessment and Plan   Diagnoses  and all orders for this visit:    1. Diabetic ulcer of right midfoot associated with type 2 diabetes mellitus, with fat layer exposed (HCC) (Primary)    2. Diabetic polyneuropathy associated with type 2 diabetes mellitus (HCC)        Sharmila to base of wound daily and covered with gauze and bandage.      Continue offloading with wedge shoe.  This seems to be helping significantly.    Recheck 4 weeks.    Patient was given instructions and counseling regarding their condition or for health maintenance advice, as well as the wound care plan and recommendations. They understand and agree with the plan.  They will follow back up here in the clinic but are instructed to contact us in the interim should they have any new, returning, or worsening symptoms or concerns. Please see specific information pulled into the AVS if appropriate.       Follow Up   Return in about 4 weeks (around 7/13/2022) for Recheck.      Shamika Whitehead MD

## 2022-06-20 ENCOUNTER — TELEPHONE (OUTPATIENT)
Dept: WOUND CARE | Facility: HOSPITAL | Age: 57
End: 2022-06-20

## 2022-06-20 NOTE — TELEPHONE ENCOUNTER
No band aids, so just reorder kerlix roll gauze, tape and nss(previously ordered iliana, 4x4s), change once daily

## 2023-02-21 ENCOUNTER — TRANSCRIBE ORDERS (OUTPATIENT)
Dept: ADMINISTRATIVE | Facility: HOSPITAL | Age: 58
End: 2023-02-21
Payer: MEDICARE

## 2023-02-21 DIAGNOSIS — Z12.31 VISIT FOR SCREENING MAMMOGRAM: Primary | ICD-10-CM

## 2024-03-06 ENCOUNTER — APPOINTMENT (OUTPATIENT)
Dept: GENERAL RADIOLOGY | Facility: HOSPITAL | Age: 59
End: 2024-03-06
Payer: MEDICARE

## 2024-03-06 LAB
ALBUMIN SERPL-MCNC: 3.8 G/DL (ref 3.5–5.2)
ALBUMIN/GLOB SERPL: 1.2 G/DL
ALP SERPL-CCNC: 94 U/L (ref 39–117)
ALT SERPL W P-5'-P-CCNC: 9 U/L (ref 1–33)
ANION GAP SERPL CALCULATED.3IONS-SCNC: 12.9 MMOL/L (ref 5–15)
AST SERPL-CCNC: 10 U/L (ref 1–32)
BASOPHILS # BLD AUTO: 0.08 10*3/MM3 (ref 0–0.2)
BASOPHILS NFR BLD AUTO: 0.9 % (ref 0–1.5)
BILIRUB SERPL-MCNC: 1.2 MG/DL (ref 0–1.2)
BUN SERPL-MCNC: 15 MG/DL (ref 8–23)
BUN/CREAT SERPL: 14.9 (ref 7–25)
CALCIUM SPEC-SCNC: 9.3 MG/DL (ref 8.6–10.5)
CHLORIDE SERPL-SCNC: 99 MMOL/L (ref 98–107)
CO2 SERPL-SCNC: 25.1 MMOL/L (ref 22–29)
CREAT SERPL-MCNC: 1.01 MG/DL (ref 0.57–1)
DEPRECATED RDW RBC AUTO: 55.3 FL (ref 37–54)
EGFRCR SERPLBLD CKD-EPI 2021: 63.9 ML/MIN/1.73
EOSINOPHIL # BLD AUTO: 0.08 10*3/MM3 (ref 0–0.4)
EOSINOPHIL NFR BLD AUTO: 0.9 % (ref 0.3–6.2)
ERYTHROCYTE [DISTWIDTH] IN BLOOD BY AUTOMATED COUNT: 17.1 % (ref 12.3–15.4)
FLUAV SUBTYP SPEC NAA+PROBE: NOT DETECTED
FLUBV RNA ISLT QL NAA+PROBE: NOT DETECTED
GLOBULIN UR ELPH-MCNC: 3.3 GM/DL
GLUCOSE SERPL-MCNC: 336 MG/DL (ref 65–99)
HCT VFR BLD AUTO: 46.4 % (ref 34–46.6)
HGB BLD-MCNC: 14.3 G/DL (ref 12–15.9)
HOLD SPECIMEN: NORMAL
HOLD SPECIMEN: NORMAL
IMM GRANULOCYTES # BLD AUTO: 0.02 10*3/MM3 (ref 0–0.05)
IMM GRANULOCYTES NFR BLD AUTO: 0.2 % (ref 0–0.5)
LYMPHOCYTES # BLD AUTO: 1.79 10*3/MM3 (ref 0.7–3.1)
LYMPHOCYTES NFR BLD AUTO: 19.4 % (ref 19.6–45.3)
MCH RBC QN AUTO: 27.9 PG (ref 26.6–33)
MCHC RBC AUTO-ENTMCNC: 30.8 G/DL (ref 31.5–35.7)
MCV RBC AUTO: 90.4 FL (ref 79–97)
MONOCYTES # BLD AUTO: 0.78 10*3/MM3 (ref 0.1–0.9)
MONOCYTES NFR BLD AUTO: 8.4 % (ref 5–12)
NEUTROPHILS NFR BLD AUTO: 6.5 10*3/MM3 (ref 1.7–7)
NEUTROPHILS NFR BLD AUTO: 70.2 % (ref 42.7–76)
NRBC BLD AUTO-RTO: 0 /100 WBC (ref 0–0.2)
NT-PROBNP SERPL-MCNC: ABNORMAL PG/ML (ref 0–900)
PLATELET # BLD AUTO: 258 10*3/MM3 (ref 140–450)
PMV BLD AUTO: 11.3 FL (ref 6–12)
POTASSIUM SERPL-SCNC: 4.1 MMOL/L (ref 3.5–5.2)
PROT SERPL-MCNC: 7.1 G/DL (ref 6–8.5)
RBC # BLD AUTO: 5.13 10*6/MM3 (ref 3.77–5.28)
RSV RNA NPH QL NAA+NON-PROBE: NOT DETECTED
SARS-COV-2 RNA RESP QL NAA+PROBE: NOT DETECTED
SODIUM SERPL-SCNC: 137 MMOL/L (ref 136–145)
TROPONIN T SERPL HS-MCNC: 18 NG/L
WBC NRBC COR # BLD AUTO: 9.25 10*3/MM3 (ref 3.4–10.8)
WHOLE BLOOD HOLD COAG: NORMAL
WHOLE BLOOD HOLD SPECIMEN: NORMAL

## 2024-03-06 PROCEDURE — 93010 ELECTROCARDIOGRAM REPORT: CPT | Performed by: INTERNAL MEDICINE

## 2024-03-06 PROCEDURE — 71045 X-RAY EXAM CHEST 1 VIEW: CPT

## 2024-03-06 PROCEDURE — 80053 COMPREHEN METABOLIC PANEL: CPT

## 2024-03-06 PROCEDURE — 93005 ELECTROCARDIOGRAM TRACING: CPT | Performed by: EMERGENCY MEDICINE

## 2024-03-06 PROCEDURE — 83880 ASSAY OF NATRIURETIC PEPTIDE: CPT

## 2024-03-06 PROCEDURE — 84484 ASSAY OF TROPONIN QUANT: CPT

## 2024-03-06 PROCEDURE — 87637 SARSCOV2&INF A&B&RSV AMP PRB: CPT

## 2024-03-06 PROCEDURE — 85025 COMPLETE CBC W/AUTO DIFF WBC: CPT | Performed by: EMERGENCY MEDICINE

## 2024-03-06 PROCEDURE — 36415 COLL VENOUS BLD VENIPUNCTURE: CPT | Performed by: EMERGENCY MEDICINE

## 2024-03-06 PROCEDURE — 99285 EMERGENCY DEPT VISIT HI MDM: CPT

## 2024-03-06 PROCEDURE — 93005 ELECTROCARDIOGRAM TRACING: CPT

## 2024-03-06 RX ORDER — SODIUM CHLORIDE 0.9 % (FLUSH) 0.9 %
10 SYRINGE (ML) INJECTION AS NEEDED
Status: DISCONTINUED | OUTPATIENT
Start: 2024-03-06 | End: 2024-03-13 | Stop reason: HOSPADM

## 2024-03-07 ENCOUNTER — APPOINTMENT (OUTPATIENT)
Facility: HOSPITAL | Age: 59
End: 2024-03-07
Payer: MEDICARE

## 2024-03-07 ENCOUNTER — HOSPITAL ENCOUNTER (INPATIENT)
Facility: HOSPITAL | Age: 59
LOS: 6 days | Discharge: HOME | End: 2024-03-13
Attending: EMERGENCY MEDICINE | Admitting: INTERNAL MEDICINE
Payer: MEDICARE

## 2024-03-07 DIAGNOSIS — I50.9 ACUTE ON CHRONIC CONGESTIVE HEART FAILURE, UNSPECIFIED HEART FAILURE TYPE: Primary | ICD-10-CM

## 2024-03-07 DIAGNOSIS — Z78.9 DECREASED ACTIVITIES OF DAILY LIVING (ADL): ICD-10-CM

## 2024-03-07 DIAGNOSIS — R26.2 DIFFICULTY WALKING: ICD-10-CM

## 2024-03-07 PROBLEM — Z89.411 RIGHT GREAT TOE AMPUTEE: Status: ACTIVE | Noted: 2024-03-07

## 2024-03-07 PROBLEM — B35.1 ONYCHOMYCOSIS: Status: ACTIVE | Noted: 2024-03-07

## 2024-03-07 PROBLEM — J81.0 ACUTE PULMONARY EDEMA: Status: ACTIVE | Noted: 2024-03-07

## 2024-03-07 PROBLEM — E11.65 UNCONTROLLED DIABETES MELLITUS WITH HYPERGLYCEMIA: Status: ACTIVE | Noted: 2022-01-15

## 2024-03-07 PROBLEM — M79.672 FOOT PAIN, BILATERAL: Status: ACTIVE | Noted: 2024-03-07

## 2024-03-07 PROBLEM — L60.0 ONYCHOCRYPTOSIS: Status: ACTIVE | Noted: 2024-03-07

## 2024-03-07 PROBLEM — M79.671 FOOT PAIN, BILATERAL: Status: ACTIVE | Noted: 2024-03-07

## 2024-03-07 LAB
ALBUMIN SERPL-MCNC: 3.7 G/DL (ref 3.5–5.2)
ALBUMIN/GLOB SERPL: 1.2 G/DL
ALP SERPL-CCNC: 93 U/L (ref 39–117)
ALT SERPL W P-5'-P-CCNC: 7 U/L (ref 1–33)
ANION GAP SERPL CALCULATED.3IONS-SCNC: 13 MMOL/L (ref 5–15)
AST SERPL-CCNC: 11 U/L (ref 1–32)
BASOPHILS # BLD AUTO: 0.06 10*3/MM3 (ref 0–0.2)
BASOPHILS NFR BLD AUTO: 0.6 % (ref 0–1.5)
BH CV LOWER VASCULAR LEFT COMMON FEMORAL AUGMENT: NORMAL
BH CV LOWER VASCULAR LEFT COMMON FEMORAL COMPETENT: NORMAL
BH CV LOWER VASCULAR LEFT COMMON FEMORAL COMPRESS: NORMAL
BH CV LOWER VASCULAR LEFT COMMON FEMORAL PHASIC: NORMAL
BH CV LOWER VASCULAR LEFT COMMON FEMORAL SPONT: NORMAL
BH CV LOWER VASCULAR LEFT DISTAL FEMORAL COMPRESS: NORMAL
BH CV LOWER VASCULAR LEFT GASTRONEMIUS COMPRESS: NORMAL
BH CV LOWER VASCULAR LEFT GREATER SAPH AK COMPRESS: NORMAL
BH CV LOWER VASCULAR LEFT GREATER SAPH BK COMPRESS: NORMAL
BH CV LOWER VASCULAR LEFT LESSER SAPH COMPRESS: NORMAL
BH CV LOWER VASCULAR LEFT MID FEMORAL AUGMENT: NORMAL
BH CV LOWER VASCULAR LEFT MID FEMORAL COMPETENT: NORMAL
BH CV LOWER VASCULAR LEFT MID FEMORAL COMPRESS: NORMAL
BH CV LOWER VASCULAR LEFT MID FEMORAL PHASIC: NORMAL
BH CV LOWER VASCULAR LEFT MID FEMORAL SPONT: NORMAL
BH CV LOWER VASCULAR LEFT PERONEAL COMPRESS: NORMAL
BH CV LOWER VASCULAR LEFT POPLITEAL AUGMENT: NORMAL
BH CV LOWER VASCULAR LEFT POPLITEAL COMPETENT: NORMAL
BH CV LOWER VASCULAR LEFT POPLITEAL COMPRESS: NORMAL
BH CV LOWER VASCULAR LEFT POPLITEAL PHASIC: NORMAL
BH CV LOWER VASCULAR LEFT POPLITEAL SPONT: NORMAL
BH CV LOWER VASCULAR LEFT POSTERIOR TIBIAL COMPRESS: NORMAL
BH CV LOWER VASCULAR LEFT PROXIMAL FEMORAL COMPRESS: NORMAL
BH CV LOWER VASCULAR LEFT SAPHENOFEMORAL JUNCTION COMPRESS: NORMAL
BH CV LOWER VASCULAR RIGHT COMMON FEMORAL AUGMENT: NORMAL
BH CV LOWER VASCULAR RIGHT COMMON FEMORAL COMPETENT: NORMAL
BH CV LOWER VASCULAR RIGHT COMMON FEMORAL COMPRESS: NORMAL
BH CV LOWER VASCULAR RIGHT COMMON FEMORAL PHASIC: NORMAL
BH CV LOWER VASCULAR RIGHT COMMON FEMORAL SPONT: NORMAL
BH CV LOWER VASCULAR RIGHT DISTAL FEMORAL COMPRESS: NORMAL
BH CV LOWER VASCULAR RIGHT GASTRONEMIUS COMPRESS: NORMAL
BH CV LOWER VASCULAR RIGHT GREATER SAPH AK COMPRESS: NORMAL
BH CV LOWER VASCULAR RIGHT GREATER SAPH BK COMPRESS: NORMAL
BH CV LOWER VASCULAR RIGHT LESSER SAPH COMPRESS: NORMAL
BH CV LOWER VASCULAR RIGHT MID FEMORAL AUGMENT: NORMAL
BH CV LOWER VASCULAR RIGHT MID FEMORAL COMPETENT: NORMAL
BH CV LOWER VASCULAR RIGHT MID FEMORAL COMPRESS: NORMAL
BH CV LOWER VASCULAR RIGHT MID FEMORAL PHASIC: NORMAL
BH CV LOWER VASCULAR RIGHT MID FEMORAL SPONT: NORMAL
BH CV LOWER VASCULAR RIGHT PERONEAL COMPRESS: NORMAL
BH CV LOWER VASCULAR RIGHT POPLITEAL AUGMENT: NORMAL
BH CV LOWER VASCULAR RIGHT POPLITEAL COMPETENT: NORMAL
BH CV LOWER VASCULAR RIGHT POPLITEAL COMPRESS: NORMAL
BH CV LOWER VASCULAR RIGHT POPLITEAL PHASIC: NORMAL
BH CV LOWER VASCULAR RIGHT POPLITEAL SPONT: NORMAL
BH CV LOWER VASCULAR RIGHT POSTERIOR TIBIAL COMPRESS: NORMAL
BH CV LOWER VASCULAR RIGHT PROXIMAL FEMORAL COMPRESS: NORMAL
BH CV LOWER VASCULAR RIGHT SAPHENOFEMORAL JUNCTION COMPRESS: NORMAL
BH CV VAS PRELIMINARY FINDINGS SCRIPTING: 1
BILIRUB SERPL-MCNC: 1.2 MG/DL (ref 0–1.2)
BUN SERPL-MCNC: 14 MG/DL (ref 8–23)
BUN/CREAT SERPL: 15.1 (ref 7–25)
CALCIUM SPEC-SCNC: 8.9 MG/DL (ref 8.6–10.5)
CHLORIDE SERPL-SCNC: 98 MMOL/L (ref 98–107)
CO2 SERPL-SCNC: 25 MMOL/L (ref 22–29)
CREAT SERPL-MCNC: 0.93 MG/DL (ref 0.57–1)
DEPRECATED RDW RBC AUTO: 54.3 FL (ref 37–54)
EGFRCR SERPLBLD CKD-EPI 2021: 70.5 ML/MIN/1.73
EOSINOPHIL # BLD AUTO: 0.13 10*3/MM3 (ref 0–0.4)
EOSINOPHIL NFR BLD AUTO: 1.4 % (ref 0.3–6.2)
ERYTHROCYTE [DISTWIDTH] IN BLOOD BY AUTOMATED COUNT: 17 % (ref 12.3–15.4)
GLOBULIN UR ELPH-MCNC: 3.1 GM/DL
GLUCOSE BLDC GLUCOMTR-MCNC: 184 MG/DL (ref 70–99)
GLUCOSE BLDC GLUCOMTR-MCNC: 241 MG/DL (ref 70–99)
GLUCOSE BLDC GLUCOMTR-MCNC: 288 MG/DL (ref 70–99)
GLUCOSE BLDC GLUCOMTR-MCNC: 337 MG/DL (ref 70–99)
GLUCOSE SERPL-MCNC: 223 MG/DL (ref 65–99)
HCT VFR BLD AUTO: 45.6 % (ref 34–46.6)
HGB BLD-MCNC: 13.9 G/DL (ref 12–15.9)
IMM GRANULOCYTES # BLD AUTO: 0.03 10*3/MM3 (ref 0–0.05)
IMM GRANULOCYTES NFR BLD AUTO: 0.3 % (ref 0–0.5)
LYMPHOCYTES # BLD AUTO: 2.08 10*3/MM3 (ref 0.7–3.1)
LYMPHOCYTES NFR BLD AUTO: 22.4 % (ref 19.6–45.3)
MCH RBC QN AUTO: 27.5 PG (ref 26.6–33)
MCHC RBC AUTO-ENTMCNC: 30.5 G/DL (ref 31.5–35.7)
MCV RBC AUTO: 90.3 FL (ref 79–97)
MONOCYTES # BLD AUTO: 0.69 10*3/MM3 (ref 0.1–0.9)
MONOCYTES NFR BLD AUTO: 7.4 % (ref 5–12)
NEUTROPHILS NFR BLD AUTO: 6.28 10*3/MM3 (ref 1.7–7)
NEUTROPHILS NFR BLD AUTO: 67.9 % (ref 42.7–76)
NRBC BLD AUTO-RTO: 0 /100 WBC (ref 0–0.2)
PLATELET # BLD AUTO: 236 10*3/MM3 (ref 140–450)
PMV BLD AUTO: 10.9 FL (ref 6–12)
POTASSIUM SERPL-SCNC: 3.7 MMOL/L (ref 3.5–5.2)
PROT SERPL-MCNC: 6.8 G/DL (ref 6–8.5)
QT INTERVAL: 410 MS
QTC INTERVAL: 501 MS
RBC # BLD AUTO: 5.05 10*6/MM3 (ref 3.77–5.28)
SODIUM SERPL-SCNC: 136 MMOL/L (ref 136–145)
WBC NRBC COR # BLD AUTO: 9.27 10*3/MM3 (ref 3.4–10.8)

## 2024-03-07 PROCEDURE — 25010000002 ENOXAPARIN PER 10 MG: Performed by: INTERNAL MEDICINE

## 2024-03-07 PROCEDURE — 82948 REAGENT STRIP/BLOOD GLUCOSE: CPT | Performed by: INTERNAL MEDICINE

## 2024-03-07 PROCEDURE — 93970 EXTREMITY STUDY: CPT | Performed by: SURGERY

## 2024-03-07 PROCEDURE — 25010000002 FUROSEMIDE PER 20 MG: Performed by: INTERNAL MEDICINE

## 2024-03-07 PROCEDURE — G8404 LOW EXTEMITY NEUR EXAM DOCUM: HCPCS | Performed by: PODIATRIST

## 2024-03-07 PROCEDURE — 85025 COMPLETE CBC W/AUTO DIFF WBC: CPT | Performed by: INTERNAL MEDICINE

## 2024-03-07 PROCEDURE — 94640 AIRWAY INHALATION TREATMENT: CPT

## 2024-03-07 PROCEDURE — 82948 REAGENT STRIP/BLOOD GLUCOSE: CPT

## 2024-03-07 PROCEDURE — 94799 UNLISTED PULMONARY SVC/PX: CPT

## 2024-03-07 PROCEDURE — 99221 1ST HOSP IP/OBS SF/LOW 40: CPT | Performed by: PODIATRIST

## 2024-03-07 PROCEDURE — 11721 DEBRIDE NAIL 6 OR MORE: CPT | Performed by: PODIATRIST

## 2024-03-07 PROCEDURE — 25010000002 METHYLPREDNISOLONE PER 40 MG: Performed by: INTERNAL MEDICINE

## 2024-03-07 PROCEDURE — 25010000002 FUROSEMIDE PER 20 MG: Performed by: EMERGENCY MEDICINE

## 2024-03-07 PROCEDURE — 80053 COMPREHEN METABOLIC PANEL: CPT | Performed by: INTERNAL MEDICINE

## 2024-03-07 PROCEDURE — 63710000001 INSULIN LISPRO (HUMAN) PER 5 UNITS: Performed by: INTERNAL MEDICINE

## 2024-03-07 PROCEDURE — 93970 EXTREMITY STUDY: CPT

## 2024-03-07 RX ORDER — GABAPENTIN 400 MG/1
800 CAPSULE ORAL EVERY 8 HOURS SCHEDULED
Status: DISCONTINUED | OUTPATIENT
Start: 2024-03-07 | End: 2024-03-13 | Stop reason: HOSPADM

## 2024-03-07 RX ORDER — ENOXAPARIN SODIUM 100 MG/ML
40 INJECTION SUBCUTANEOUS DAILY
Status: DISCONTINUED | OUTPATIENT
Start: 2024-03-07 | End: 2024-03-13 | Stop reason: HOSPADM

## 2024-03-07 RX ORDER — AMOXICILLIN 250 MG
2 CAPSULE ORAL 2 TIMES DAILY PRN
Status: DISCONTINUED | OUTPATIENT
Start: 2024-03-07 | End: 2024-03-13 | Stop reason: HOSPADM

## 2024-03-07 RX ORDER — NITROGLYCERIN 0.4 MG/1
0.4 TABLET SUBLINGUAL
Status: DISCONTINUED | OUTPATIENT
Start: 2024-03-07 | End: 2024-03-13 | Stop reason: HOSPADM

## 2024-03-07 RX ORDER — GABAPENTIN 400 MG/1
400 CAPSULE ORAL DAILY
Status: DISCONTINUED | OUTPATIENT
Start: 2024-03-07 | End: 2024-03-07

## 2024-03-07 RX ORDER — TEMAZEPAM 15 MG/1
15 CAPSULE ORAL NIGHTLY PRN
Status: DISCONTINUED | OUTPATIENT
Start: 2024-03-07 | End: 2024-03-13 | Stop reason: HOSPADM

## 2024-03-07 RX ORDER — INSULIN LISPRO 100 [IU]/ML
2-9 INJECTION, SOLUTION INTRAVENOUS; SUBCUTANEOUS
Status: DISCONTINUED | OUTPATIENT
Start: 2024-03-07 | End: 2024-03-09

## 2024-03-07 RX ORDER — METOPROLOL SUCCINATE 50 MG/1
50 TABLET, EXTENDED RELEASE ORAL DAILY
Status: DISCONTINUED | OUTPATIENT
Start: 2024-03-07 | End: 2024-03-13 | Stop reason: HOSPADM

## 2024-03-07 RX ORDER — IPRATROPIUM BROMIDE AND ALBUTEROL SULFATE 2.5; .5 MG/3ML; MG/3ML
3 SOLUTION RESPIRATORY (INHALATION)
Status: DISCONTINUED | OUTPATIENT
Start: 2024-03-07 | End: 2024-03-10

## 2024-03-07 RX ORDER — ONDANSETRON 2 MG/ML
4 INJECTION INTRAMUSCULAR; INTRAVENOUS EVERY 4 HOURS PRN
Status: DISCONTINUED | OUTPATIENT
Start: 2024-03-07 | End: 2024-03-13 | Stop reason: HOSPADM

## 2024-03-07 RX ORDER — ALPRAZOLAM 0.25 MG/1
0.25 TABLET ORAL EVERY 6 HOURS PRN
Status: DISCONTINUED | OUTPATIENT
Start: 2024-03-07 | End: 2024-03-12

## 2024-03-07 RX ORDER — ISOSORBIDE MONONITRATE 60 MG/1
60 TABLET, EXTENDED RELEASE ORAL 2 TIMES DAILY
Status: DISCONTINUED | OUTPATIENT
Start: 2024-03-07 | End: 2024-03-13 | Stop reason: HOSPADM

## 2024-03-07 RX ORDER — HYDROCODONE BITARTRATE AND ACETAMINOPHEN 5; 325 MG/1; MG/1
1 TABLET ORAL EVERY 4 HOURS PRN
Status: DISCONTINUED | OUTPATIENT
Start: 2024-03-07 | End: 2024-03-13 | Stop reason: HOSPADM

## 2024-03-07 RX ORDER — ALBUTEROL SULFATE 90 UG/1
2 AEROSOL, METERED RESPIRATORY (INHALATION) EVERY 6 HOURS PRN
COMMUNITY
Start: 2024-02-22

## 2024-03-07 RX ORDER — METHYLPREDNISOLONE SODIUM SUCCINATE 40 MG/ML
40 INJECTION, POWDER, LYOPHILIZED, FOR SOLUTION INTRAMUSCULAR; INTRAVENOUS EVERY 8 HOURS
Status: DISCONTINUED | OUTPATIENT
Start: 2024-03-07 | End: 2024-03-09

## 2024-03-07 RX ORDER — DEXTROSE MONOHYDRATE 25 G/50ML
25 INJECTION, SOLUTION INTRAVENOUS
Status: DISCONTINUED | OUTPATIENT
Start: 2024-03-07 | End: 2024-03-09

## 2024-03-07 RX ORDER — MINERAL OIL/HYDROPHIL PETROLAT
1 OINTMENT (GRAM) TOPICAL 2 TIMES DAILY
Status: DISCONTINUED | OUTPATIENT
Start: 2024-03-07 | End: 2024-03-11

## 2024-03-07 RX ORDER — ALUMINA, MAGNESIA, AND SIMETHICONE 2400; 2400; 240 MG/30ML; MG/30ML; MG/30ML
15 SUSPENSION ORAL EVERY 6 HOURS PRN
Status: DISCONTINUED | OUTPATIENT
Start: 2024-03-07 | End: 2024-03-13 | Stop reason: HOSPADM

## 2024-03-07 RX ORDER — NICOTINE POLACRILEX 4 MG
15 LOZENGE BUCCAL
Status: DISCONTINUED | OUTPATIENT
Start: 2024-03-07 | End: 2024-03-09

## 2024-03-07 RX ORDER — FUROSEMIDE 10 MG/ML
40 INJECTION INTRAMUSCULAR; INTRAVENOUS ONCE
Status: COMPLETED | OUTPATIENT
Start: 2024-03-07 | End: 2024-03-07

## 2024-03-07 RX ORDER — NALOXONE HCL 0.4 MG/ML
0.4 VIAL (ML) INJECTION
Status: DISCONTINUED | OUTPATIENT
Start: 2024-03-07 | End: 2024-03-10

## 2024-03-07 RX ORDER — FAMOTIDINE 20 MG/1
40 TABLET, FILM COATED ORAL DAILY
Status: DISCONTINUED | OUTPATIENT
Start: 2024-03-07 | End: 2024-03-13 | Stop reason: HOSPADM

## 2024-03-07 RX ORDER — ACETAMINOPHEN 325 MG/1
650 TABLET ORAL EVERY 4 HOURS PRN
Status: DISCONTINUED | OUTPATIENT
Start: 2024-03-07 | End: 2024-03-13 | Stop reason: HOSPADM

## 2024-03-07 RX ORDER — SODIUM CHLORIDE 0.9 % (FLUSH) 0.9 %
10 SYRINGE (ML) INJECTION EVERY 12 HOURS SCHEDULED
Status: DISCONTINUED | OUTPATIENT
Start: 2024-03-07 | End: 2024-03-13 | Stop reason: HOSPADM

## 2024-03-07 RX ORDER — FUROSEMIDE 10 MG/ML
40 INJECTION INTRAMUSCULAR; INTRAVENOUS EVERY 12 HOURS
Status: DISCONTINUED | OUTPATIENT
Start: 2024-03-07 | End: 2024-03-10

## 2024-03-07 RX ORDER — IBUPROFEN 600 MG/1
1 TABLET ORAL
Status: DISCONTINUED | OUTPATIENT
Start: 2024-03-07 | End: 2024-03-09

## 2024-03-07 RX ORDER — BISACODYL 10 MG
10 SUPPOSITORY, RECTAL RECTAL DAILY PRN
Status: DISCONTINUED | OUTPATIENT
Start: 2024-03-07 | End: 2024-03-13 | Stop reason: HOSPADM

## 2024-03-07 RX ORDER — SODIUM CHLORIDE 9 MG/ML
40 INJECTION, SOLUTION INTRAVENOUS AS NEEDED
Status: DISCONTINUED | OUTPATIENT
Start: 2024-03-07 | End: 2024-03-13 | Stop reason: HOSPADM

## 2024-03-07 RX ORDER — POLYETHYLENE GLYCOL 3350 17 G/17G
17 POWDER, FOR SOLUTION ORAL DAILY PRN
Status: DISCONTINUED | OUTPATIENT
Start: 2024-03-07 | End: 2024-03-13 | Stop reason: HOSPADM

## 2024-03-07 RX ORDER — MORPHINE SULFATE 2 MG/ML
2 INJECTION, SOLUTION INTRAMUSCULAR; INTRAVENOUS
Status: DISCONTINUED | OUTPATIENT
Start: 2024-03-07 | End: 2024-03-10

## 2024-03-07 RX ORDER — BISACODYL 5 MG/1
5 TABLET, DELAYED RELEASE ORAL DAILY PRN
Status: DISCONTINUED | OUTPATIENT
Start: 2024-03-07 | End: 2024-03-13 | Stop reason: HOSPADM

## 2024-03-07 RX ORDER — SODIUM CHLORIDE 0.9 % (FLUSH) 0.9 %
10 SYRINGE (ML) INJECTION AS NEEDED
Status: DISCONTINUED | OUTPATIENT
Start: 2024-03-07 | End: 2024-03-13 | Stop reason: HOSPADM

## 2024-03-07 RX ADMIN — ALPRAZOLAM 0.25 MG: 0.25 TABLET ORAL at 21:25

## 2024-03-07 RX ADMIN — FUROSEMIDE 40 MG: 10 INJECTION, SOLUTION INTRAMUSCULAR; INTRAVENOUS at 01:22

## 2024-03-07 RX ADMIN — ALPRAZOLAM 0.25 MG: 0.25 TABLET ORAL at 03:37

## 2024-03-07 RX ADMIN — METFORMIN HYDROCHLORIDE 1000 MG: 500 TABLET, FILM COATED ORAL at 08:16

## 2024-03-07 RX ADMIN — HYDROCODONE BITARTRATE AND ACETAMINOPHEN 1 TABLET: 5; 325 TABLET ORAL at 03:37

## 2024-03-07 RX ADMIN — GABAPENTIN 400 MG: 400 CAPSULE ORAL at 08:16

## 2024-03-07 RX ADMIN — HYDROCODONE BITARTRATE AND ACETAMINOPHEN 1 TABLET: 5; 325 TABLET ORAL at 08:16

## 2024-03-07 RX ADMIN — ENOXAPARIN SODIUM 40 MG: 100 INJECTION SUBCUTANEOUS at 08:15

## 2024-03-07 RX ADMIN — ALPRAZOLAM 0.25 MG: 0.25 TABLET ORAL at 15:28

## 2024-03-07 RX ADMIN — FAMOTIDINE 40 MG: 20 TABLET ORAL at 08:16

## 2024-03-07 RX ADMIN — INSULIN LISPRO 4 UNITS: 100 INJECTION, SOLUTION INTRAVENOUS; SUBCUTANEOUS at 08:16

## 2024-03-07 RX ADMIN — ISOSORBIDE MONONITRATE 60 MG: 60 TABLET, EXTENDED RELEASE ORAL at 08:16

## 2024-03-07 RX ADMIN — HYDROCODONE BITARTRATE AND ACETAMINOPHEN 1 TABLET: 5; 325 TABLET ORAL at 21:16

## 2024-03-07 RX ADMIN — WHITE PETROLATUM 1 APPLICATION: 1.75 OINTMENT TOPICAL at 21:16

## 2024-03-07 RX ADMIN — FUROSEMIDE 40 MG: 10 INJECTION, SOLUTION INTRAMUSCULAR; INTRAVENOUS at 08:15

## 2024-03-07 RX ADMIN — INSULIN LISPRO 2 UNITS: 100 INJECTION, SOLUTION INTRAVENOUS; SUBCUTANEOUS at 12:23

## 2024-03-07 RX ADMIN — INSULIN LISPRO 7 UNITS: 100 INJECTION, SOLUTION INTRAVENOUS; SUBCUTANEOUS at 20:34

## 2024-03-07 RX ADMIN — IPRATROPIUM BROMIDE AND ALBUTEROL SULFATE 3 ML: .5; 3 SOLUTION RESPIRATORY (INHALATION) at 12:06

## 2024-03-07 RX ADMIN — INSULIN LISPRO 6 UNITS: 100 INJECTION, SOLUTION INTRAVENOUS; SUBCUTANEOUS at 17:17

## 2024-03-07 RX ADMIN — HYDROCODONE BITARTRATE AND ACETAMINOPHEN 1 TABLET: 5; 325 TABLET ORAL at 12:00

## 2024-03-07 RX ADMIN — GABAPENTIN 800 MG: 400 CAPSULE ORAL at 21:16

## 2024-03-07 RX ADMIN — METHYLPREDNISOLONE SODIUM SUCCINATE 40 MG: 40 INJECTION INTRAMUSCULAR; INTRAVENOUS at 12:00

## 2024-03-07 RX ADMIN — METHYLPREDNISOLONE SODIUM SUCCINATE 40 MG: 40 INJECTION INTRAMUSCULAR; INTRAVENOUS at 17:12

## 2024-03-07 RX ADMIN — ISOSORBIDE MONONITRATE 60 MG: 60 TABLET, EXTENDED RELEASE ORAL at 20:34

## 2024-03-07 RX ADMIN — IPRATROPIUM BROMIDE AND ALBUTEROL SULFATE 3 ML: .5; 3 SOLUTION RESPIRATORY (INHALATION) at 18:23

## 2024-03-07 RX ADMIN — Medication 10 ML: at 20:34

## 2024-03-07 RX ADMIN — METOPROLOL SUCCINATE 50 MG: 50 TABLET, EXTENDED RELEASE ORAL at 08:16

## 2024-03-07 RX ADMIN — IPRATROPIUM BROMIDE AND ALBUTEROL SULFATE 3 ML: .5; 3 SOLUTION RESPIRATORY (INHALATION) at 15:50

## 2024-03-07 RX ADMIN — Medication 10 ML: at 08:15

## 2024-03-07 RX ADMIN — FUROSEMIDE 40 MG: 10 INJECTION, SOLUTION INTRAMUSCULAR; INTRAVENOUS at 20:34

## 2024-03-07 NOTE — PLAN OF CARE
"Goal Outcome Evaluation:  Plan of Care Reviewed With: patient        Progress: improving          VSS, room air. No c/o of shortness of air. Continued IV lasix and steroids per MAR. Strict I & O with daily weights. Ultrasound of bilateral lower extremities negative for DVT. Right lower extremity remains warm, taunt, edematous with redness present. C/o intermittent \"brett hoarse\" cramping in calf. Norco given PRN per MAR. Continue ACHS with sliding scale insulin coverage. Wound care nurse eval today, dressing changes to right foot per orders. Will CTM and provide care.                         "

## 2024-03-07 NOTE — SIGNIFICANT NOTE
03/07/24 1206   Plan   Plan RUKHSANA RN met with patient at bedside.  Patient sitting in recliner, reports son, Kurt, lives with patient.  Reports daughter in law, Enma, provides transportation.  Patient is able to provide own ADL's.  Reports having [facility] home health in the past and would like to have at discharge.  Reports no financial needs.  PCP and facesheet verified.  Patient plans to discharge home with family and possible home health.  Will need to be monitored for possible discharge needs.

## 2024-03-07 NOTE — PLAN OF CARE
Goal Outcome Evaluation:  Plan of Care Reviewed With: patient        Progress: improving       Medicated for pain and anxiety per order. Patient short of air at times but improved since arrival. Voiding frequently. Bed alert on. Salvatore Lewis RN

## 2024-03-07 NOTE — H&P
[facility]   HISTORY AND PHYSICAL    Patient Name: Nasima Dubose  : 1965  MRN: [id number]  Primary Care Physician:  Jack Muir MD  Date of admission: 3/7/2024    Subjective   Subjective     Chief Complaint:   Shortness of breath, cough, chest pressure      HPI:    Nasima Dubose is a 58 y.o. female with known history of CHF, COPD came to emergency room with increasing shortness of breath.  Patient reports having trouble breathing for several weeks.  Progressively got worse.  Patient does not see any cardiologist or pulmonologist.  She is being managed by her PCP.  Patient denies any fever or chills.  Questionable weight gain.  Reports chronic edema on right leg.  Complains of neuropathy and would like to have her gabapentin.  Workup in the ER revealed pulmonary edema.        Review of Systems:    No fever chills  Shortness of breath   Exertional dyspnea.  No chest pain.  Leg swelling.  Cough, no coughing up blood.    .      Personal History     Past Medical History:   Diagnosis Date    Anxiety     ANIXETY ATTACK AT WORK ABOUT A MONTH AGO    Arthritis     Condition not found     PSYCHIATRIC PROBLEMS- PT TOOK AN OVERDOSE OF MEDICATION FOR BLOOD SUGAR. TOOK 8 PILLS.    Condition not found     HERBS USED- BC POWDER AS NEEDED    Congestive heart failure (CHF)     COPD (chronic obstructive pulmonary disease)     Diabetes     Diabetic neuropathy     Health care home, active care coordination     [facility] (FAX: [phone])    History of anesthesia reaction     HARD TIME WAKING UP SOMETIME    History of hiatal hernia     History of kidney stones     Hyperlipidemia     Hypertension     Irregular heartbeat     PT IS SEEING  (RECENTLY DID A 24-HOUR HEART MONITOR).    Peripheral artery disease     Stroke     PIN STROKE FROMA MIGRAINE/ 20 YEARS AGO    Wears glasses        Past Surgical History:   Procedure Laterality Date    ABDOMINAL SURGERY      33 HERNIA REPAIR, GALLBLADER REMOVED     BLADDER REPAIR      CHOLECYSTECTOMY      DILATATION AND CURETTAGE      X2    HERNIA REPAIR      HYSTERECTOMY  1992    2 CS    TOE AMPUTATION Right 02/14/2017    RIGHT GREAT TOE    TONSILLECTOMY         Family History: family history includes Anesthesia problems in her mother; Diabetes in her mother. Otherwise pertinent FHx was reviewed and not pertinent to current issue.    Social History:  reports that she has been smoking cigarettes. She has never used smokeless tobacco. She reports that she does not currently use alcohol. She reports that she does not currently use drugs.    Home Medications:  Aspirin, Fluticasone-Salmeterol, HYDROcodone-acetaminophen, Insulin Degludec, Insulin Pen Needle, Vitamin C, amitriptyline, furosemide, gabapentin, gentamicin, hydrOXYzine, insulin aspart, ipratropium-albuterol, isosorbide mononitrate, linaclotide, loratadine, metFORMIN, metoprolol succinate XL, prasugrel, and simvastatin      Allergies:  Allergies   Allergen Reactions    Aminoglycosides Unknown - Low Severity    Neosporin [Neomycin-Bacitracin Zn-Polymyx] Itching and Rash    Polymyxin B Unknown - Low Severity    Pramoxine Unknown - Low Severity       Objective   Objective     Vitals:   Temp:  [97.3 °F (36.3 °C)-97.5 °F (36.4 °C)] 97.3 °F (36.3 °C)  Heart Rate:  [] 80  Resp:  [16-18] 16  BP: (138-172)/() 149/89    Physical Exam  Elderly looking female, obese, not in acute distress but coughing.  HEENT is unremarkable no sinus tenderness.  Neck supple using some accessory muscles.  Heart regular.  Rate 2 murmur.  Lungs diminished breath sounds with bilateral rhonchi and rales.  .  Abdomen is obese, right lower extremity with edema.        I have personally reviewed the results from the time of this admission to 3/7/2024 09:07 EST and agree with these findings:  [x]  Laboratory  [x]  Microbiology  [x]  Radiology  [x]  EKG/Telemetry   []  Cardiology/Vascular   []  Pathology  []  Old records  []   Other:    CBC:    WBC   Date Value Ref Range Status   03/07/2024 9.27 3.40 - 10.80 10*3/mm3 Final     RBC   Date Value Ref Range Status   03/07/2024 5.05 3.77 - 5.28 10*6/mm3 Final     Hemoglobin   Date Value Ref Range Status   03/07/2024 13.9 12.0 - 15.9 g/dL Final     Hematocrit   Date Value Ref Range Status   03/07/2024 45.6 34.0 - 46.6 % Final     MCV   Date Value Ref Range Status   03/07/2024 90.3 79.0 - 97.0 fL Final     MCH   Date Value Ref Range Status   03/07/2024 27.5 26.6 - 33.0 pg Final     MCHC   Date Value Ref Range Status   03/07/2024 30.5 (L) 31.5 - 35.7 g/dL Final     RDW   Date Value Ref Range Status   03/07/2024 17.0 (H) 12.3 - 15.4 % Final     RDW-SD   Date Value Ref Range Status   03/07/2024 54.3 (H) 37.0 - 54.0 fl Final     MPV   Date Value Ref Range Status   03/07/2024 10.9 6.0 - 12.0 fL Final     Platelets   Date Value Ref Range Status   03/07/2024 236 140 - 450 10*3/mm3 Final     Neutrophil %   Date Value Ref Range Status   03/07/2024 67.9 42.7 - 76.0 % Final     Lymphocyte %   Date Value Ref Range Status   03/07/2024 22.4 19.6 - 45.3 % Final     Monocyte %   Date Value Ref Range Status   03/07/2024 7.4 5.0 - 12.0 % Final     Eosinophil %   Date Value Ref Range Status   03/07/2024 1.4 0.3 - 6.2 % Final     Basophil %   Date Value Ref Range Status   03/07/2024 0.6 0.0 - 1.5 % Final     Immature Grans %   Date Value Ref Range Status   03/07/2024 0.3 0.0 - 0.5 % Final     Neutrophils, Absolute   Date Value Ref Range Status   03/07/2024 6.28 1.70 - 7.00 10*3/mm3 Final     Lymphocytes, Absolute   Date Value Ref Range Status   03/07/2024 2.08 0.70 - 3.10 10*3/mm3 Final     Monocytes, Absolute   Date Value Ref Range Status   03/07/2024 0.69 0.10 - 0.90 10*3/mm3 Final     Eosinophils, Absolute   Date Value Ref Range Status   03/07/2024 0.13 0.00 - 0.40 10*3/mm3 Final     Basophils, Absolute   Date Value Ref Range Status   03/07/2024 0.06 0.00 - 0.20 10*3/mm3 Final     Immature Grans, Absolute   Date  Value Ref Range Status   03/07/2024 0.03 0.00 - 0.05 10*3/mm3 Final     nRBC   Date Value Ref Range Status   03/07/2024 0.0 0.0 - 0.2 /100 WBC Final        BMP:    Lab Results   Component Value Date    GLUCOSE 223 (H) 03/07/2024    BUN 14 03/07/2024    CREATININE 0.93 03/07/2024    EGFRIFNONA 71 01/19/2022    BCR 15.1 03/07/2024    K 3.7 03/07/2024    CO2 25.0 03/07/2024    CALCIUM 8.9 03/07/2024    ALBUMIN 3.7 03/07/2024    LABIL2 0.8 (L) 04/20/2021    AST 11 03/07/2024    ALT 7 03/07/2024        XR Chest 1 View    Result Date: 3/6/2024   Cardiomegaly and vascular congestion with evidence of mild edema in the lower lung zones.  Additionally, there may be a trace amount of right pleural fluid.       BENJAMIN MARTINEZ MD       Electronically Signed and Approved By: BENJAMIN MARTINEZ MD on 3/06/2024 at 21:40                     Assessment & Plan   Assessment / Plan       Current Diagnosis:  Active Hospital Problems    Diagnosis     **CHF (congestive heart failure)     Acute pulmonary edema     Diabetic neuropathy     Uncontrolled diabetes mellitus with hyperglycemia     COPD exacerbation      Plan:     Patient came to ER with increasing shortness of breath, combination of issues, appears to have volume overload/pulmonary edema and COPD exacerbation, patient continues to smoke.  At this point we will start IV Lasix, add neb treatments and Solu-Medrol  Mid to hospital, IV Lasix.  Neb treatments, IV Solu-Medrol.  Venous Doppler of right lower extremity for edema, rule out DVT.  Resume essential home meds.  Patient requesting gabapentin to be full dose, discussed side effects and edema related to high-dose of gabapentin..  Also monitor sugars.  Monitor labs.  Further management will based on clinical course, lab results      DVT prophylaxis:  Medical DVT prophylaxis orders are present.        GI Prophylaxis:       Pepcid    CODE STATUS:    Code Status (Patient has no pulse and is not breathing): CPR (Attempt to  Resuscitate)  Medical Interventions (Patient has pulse or is breathing): Full Support    Admission Status:  I believe this patient meets inpatient status.             I have dictated this note utilizing Dragon Dictation.             Please note that portions of this note were completed with a voice recognition program.             Part of this note may be an electronic transcription/translation of spoken language to printed text         using the Dragon Dictation System.       Electronically signed by Gordon Syed MD, 03/07/24, 9:02 AM EST.    Total time spent with in evaluation and management:

## 2024-03-07 NOTE — SIGNIFICANT NOTE
Wound Eval / Progress Noted     [facility]     Patient Name: Nasima Dubose  : 1965  MRN: [id number]  Today's Date: 3/7/2024                 Admit Date: 3/7/2024    Visit Dx:    ICD-10-CM ICD-9-CM   1. Acute on chronic congestive heart failure, unspecified heart failure type  I50.9 428.0         CHF (congestive heart failure)    COPD exacerbation    Uncontrolled diabetes mellitus with hyperglycemia    Acute pulmonary edema    Diabetic neuropathy        Past Medical History:   Diagnosis Date    Anxiety     ANIXETY ATTACK AT WORK ABOUT A MONTH AGO    Arthritis     Condition not found     PSYCHIATRIC PROBLEMS- PT TOOK AN OVERDOSE OF MEDICATION FOR BLOOD SUGAR. TOOK 8 PILLS.    Condition not found     HERBS USED- BC POWDER AS NEEDED    Congestive heart failure (CHF)     COPD (chronic obstructive pulmonary disease)     Diabetes     Diabetic neuropathy     Health care home, active care coordination     [facility] (FAX: [phone])    History of anesthesia reaction     HARD TIME WAKING UP SOMETIME    History of hiatal hernia     History of kidney stones     Hyperlipidemia     Hypertension     Irregular heartbeat     PT IS SEEING  (RECENTLY DID A 24-HOUR HEART MONITOR).    Peripheral artery disease     Stroke     PIN STROKE FROMA MIGRAINE/ 20 YEARS AGO    Wears glasses         Past Surgical History:   Procedure Laterality Date    ABDOMINAL SURGERY      33 HERNIA REPAIR, GALLBLADER REMOVED    BLADDER REPAIR      CHOLECYSTECTOMY      DILATATION AND CURETTAGE      X2    HERNIA REPAIR      HYSTERECTOMY  1992    2 CS    TOE AMPUTATION Right 2017    RIGHT GREAT TOE    TONSILLECTOMY       Physical Assessment:     24 0646   Wound 24 0605 Right posterior plantar   Placement Date/Time: 24 0605   Present on Original Admission: Yes  Side: Right  Orientation: posterior  Location: plantar   Wound Image    Dressing Appearance dry;intact   Closure None   Base moist;red;pink   Periwound  dry;intact   Periwound Temperature warm   Periwound Skin Turgor soft   Edges open;callused   Drainage Characteristics/Odor serosanguineous;yellow   Drainage Amount small   Care, Wound cleansed with;irrigated with;sterile normal saline   Dressing Care dressing applied;packed with;gauze, iodoform;gauze, dry   Periwound Care absorptive dressing applied     Wound Check / Follow-up: Patient seen today for wound consult. Patient is awake and alert at time of visit. She is agreeable to assessment.     Ulceration noted to right plantar foot. Patient reports she has had this wound for approximately 7 years. Wound base with red and pink moist tissue. Wound measures 2.8 cm x 2.1 cm x 0.6 cm. Periwound tissue dry, intact, and callused. Cleansed and irrigated wound with normal saline. Filled wound with 1/4 inch iodoform packing strip. Covered with dry gauze and secured with gauze roll. Recommending daily dressing changes.    Dry, scaly skin noted to bilateral lower extremities. Recommending skin care / topical treatment with application of Aquaphor ointment.     Long, thickened toenails present. Contacted attending physician for potential podiatry consult.     Patient denies any other wounds / skin issues and the need for further assessment at this time.      Impression: Chronic ulceration to right plantar foot.       Short term goals: Regain skin integrity, skin protection, daily dressing changes, topical treatment, skin care.      Meagan Archuleta RN    3/7/2024    12:53 EST

## 2024-03-07 NOTE — ED PROVIDER NOTES
Time: 9:19 PM EST  Date of encounter:  3/6/2024  Independent Historian/Clinical History and Information was obtained by:   Patient    History is limited by: N/A    Chief Complaint: SOB      History of Present Illness:  Patient is a 58 y.o. year old female who presents to the emergency department for evaluation of SOB x 1 week.  Worse on exertion and laying down.  History of history of CHF    Patient is a 58-year-old female who presents with complaints of shortness of breath.  Patient has a history of CHF and states that she started gaining weight in her legs as well as become more short of breath.  Also has a history of diabetes and COPD.  States that she has had a difficulty catching her breath as well as had to sleep with more pillows.  States that she is barely able to get around without getting extremely winded.    HPI    Patient Care Team  Primary Care Provider: Jack Muir MD    Past Medical History:     Allergies   Allergen Reactions    Aminoglycosides Unknown - Low Severity    Neosporin [Neomycin-Bacitracin Zn-Polymyx] Itching and Rash    Polymyxin B Unknown - Low Severity    Pramoxine Unknown - Low Severity     Past Medical History:   Diagnosis Date    Anxiety     ANIXETY ATTACK AT WORK ABOUT A MONTH AGO    Arthritis     Condition not found     PSYCHIATRIC PROBLEMS- PT TOOK AN OVERDOSE OF MEDICATION FOR BLOOD SUGAR. TOOK 8 PILLS.    Condition not found     HERBS USED- BC POWDER AS NEEDED    Congestive heart failure (CHF)     COPD (chronic obstructive pulmonary disease)     Diabetes     Diabetic neuropathy     Health care home, active care coordination     [facility] (FAX: [phone])    History of anesthesia reaction     HARD TIME WAKING UP SOMETIME    History of hiatal hernia     History of kidney stones     Hyperlipidemia     Hypertension     Irregular heartbeat     PT IS SEEING  (RECENTLY DID A 24-HOUR HEART MONITOR).    Peripheral artery disease     Stroke     PIN STROKE FROMA MIGRAINE/  20 YEARS AGO    Wears glasses      Past Surgical History:   Procedure Laterality Date    ABDOMINAL SURGERY      33 HERNIA REPAIR, GALLBLADER REMOVED    BLADDER REPAIR      CHOLECYSTECTOMY      DILATATION AND CURETTAGE      X2    HERNIA REPAIR      HYSTERECTOMY  1992    2 CS    TOE AMPUTATION Right 02/14/2017    RIGHT GREAT TOE    TONSILLECTOMY       Family History   Problem Relation Age of Onset    Diabetes Mother     Anesthesia problems Mother         HARD TIME WAKING UP SOMETIMES        Home Medications:  Prior to Admission medications    Medication Sig Start Date End Date Taking? Authorizing Provider   amitriptyline (ELAVIL) 10 MG tablet Take 1 tablet by mouth every night at bedtime. 11/2/23   Janeen Kulkarni MD   Ascorbic Acid (Vitamin C) 500 MG capsule Take 500 mg by mouth Daily.    Janeen Kulkarni MD   Aspirin 81 MG capsule Take 81 mg by mouth Daily.    Janeen Kulkarni MD   B-BALTA ULTRAFINE III SHORT PEN 31G X 8 MM misc USE AS DIRECTED INTRAMUCULARLY FOUR TIMES DAILY FOR 90 DAYS 1/21/22   Janeen Kulkarni MD   Fluticasone-Salmeterol (ADVAIR/WIXELA) 250-50 MCG/ACT DISKUS Inhale 1 puff 2 (Two) Times a Day. 12/28/23   Janeen Kulkarni MD   furosemide (LASIX) 40 MG tablet Take 40 mg by mouth 2 (Two) Times a Day.    Janeen Kulkarni MD   gabapentin (NEURONTIN) 800 MG tablet Take 800 mg by mouth 3 (Three) Times a Day.    Janeen Kulkarni MD   gentamicin (GARAMYCIN) 0.1 % ointment APPLY TO AFFECTED AREA TOPICALLY FOUR TIMES A DAY 12/28/23   Janeen Kulkarni MD   HYDROcodone-acetaminophen (NORCO) 7.5-325 MG per tablet Take 1 tablet by mouth Every 8 (Eight) Hours. 6/15/21   Janeen Kulkarni MD   hydrOXYzine (ATARAX) 25 MG tablet  11/30/23   Janeen Kulkarni MD   insulin aspart (NovoLOG FlexPen) 100 UNIT/ML solution pen-injector sc pen Inject 10 Units under the skin into the appropriate area as directed 3 (Three) Times a Day With Meals.    Janeen Kulkarni MD  "  Insulin Degludec (TRESIBA FLEXTOUCH) 200 UNIT/ML solution pen-injector pen injection Inject 66 Units under the skin into the appropriate area as directed Every Night.    Janeen Kulkarni MD   ipratropium-albuterol (DUO-NEB) 0.5-2.5 mg/3 ml nebulizer 3 ML BY NEBULIZER 4 TIMES A DAY AS NEEDED 12/28/23   Janeen Kulkarni MD   isosorbide mononitrate (IMDUR) 60 MG 24 hr tablet Take 60 mg by mouth 2 (Two) Times a Day. 10/1/21   Janeen Kulkarni MD   linaclotide (Linzess) 145 MCG capsule capsule Take 145 mcg by mouth Every Morning Before Breakfast.    Janeen Kulkarni MD   loratadine (CLARITIN) 10 MG tablet Take 10 mg by mouth Daily. 10/5/21   Janeen Kulkarni MD   metFORMIN (GLUCOPHAGE) 1000 MG tablet Take 1,000 mg by mouth 2 (two) times a day.    Janeen Kulkarni MD   metoprolol succinate XL (TOPROL-XL) 50 MG 24 hr tablet Take 1 tablet by mouth Daily. 12/28/23   Janeen Kulkarni MD   prasugrel (EFFIENT) 10 MG tablet Take 10 mg by mouth Daily. 9/29/21   Janeen Kulkarni MD   simvastatin (ZOCOR) 20 MG tablet Take 1 tablet by mouth Daily With Breakfast. 12/28/23   Janeen Kulkarni MD        Social History:   Social History     Tobacco Use    Smoking status: Former    Smokeless tobacco: Never    Tobacco comments:     QUIT 4 YEARS AGO/ RECENLTY STARTED AGAIN 3 CIGS A DAY    Vaping Use    Vaping status: Never Used   Substance Use Topics    Alcohol use: Not Currently    Drug use: Not Currently         Review of Systems:  Review of Systems   Respiratory:  Positive for shortness of breath.         Physical Exam:  /92 (BP Location: Left arm, Patient Position: Sitting)   Pulse 90   Temp 97.5 °F (36.4 °C) (Oral)   Resp 16   Ht 167.6 cm (66\")   Wt 85.7 kg (189 lb)   SpO2 100%   BMI 30.51 kg/m²     Physical Exam  Vitals and nursing note reviewed.   Constitutional:       Appearance: Normal appearance.   HENT:      Head: Normocephalic and atraumatic.   Eyes:      General: No " scleral icterus.  Cardiovascular:      Rate and Rhythm: Normal rate and regular rhythm.      Heart sounds: Normal heart sounds.   Pulmonary:      Effort: Pulmonary effort is normal.      Comments: Coarse breath sounds in the bases  Abdominal:      Palpations: Abdomen is soft.      Tenderness: There is no abdominal tenderness.   Musculoskeletal:         General: Normal range of motion.      Cervical back: Normal range of motion.      Right lower leg: Edema present.      Left lower leg: Edema present.   Skin:     Findings: No rash.   Neurological:      General: No focal deficit present.      Mental Status: She is alert.                  Procedures:  Procedures      Medical Decision Making:      Comorbidities that affect care:    Congestive Heart Failure, COPD, Diabetes, Hypertension    External Notes reviewed:    Reviewed urgent care note from 1/22/2024      The following orders were placed and all results were independently analyzed by me:  Orders Placed This Encounter   Procedures    COVID-19, FLU A/B, RSV PCR 1 HR TAT - Swab, Nasopharynx    XR Chest 1 View    [facility] Draw    Comprehensive Metabolic Panel    BNP    Single High Sensitivity Troponin T    CBC Auto Differential    NPO Diet NPO Type: Strict NPO    Undress & Gown    Continuous Pulse Oximetry    Vital Signs    IP General Consult (Use specialty-specific consult if known)    Oxygen Therapy- Nasal Cannula; Titrate 1-6 LPM Per SpO2; 90 - 95%    ECG 12 Lead ED Triage Standing Order; SOA    Insert Peripheral IV    Inpatient Admission    CBC & Differential    Green Top (Gel)    Lavender Top    Gold Top - SST    Light Blue Top       Medications Given in the Emergency Department:  Medications   sodium chloride 0.9 % flush 10 mL (has no administration in time range)   furosemide (LASIX) injection 40 mg (has no administration in time range)        ED Course:    ED Course as of 03/07/24 0107   Wed Mar 06, 2024   2119 --- PROVIDER IN TRIAGE NOTE ---    The patient was  evaluated by meJung in triage. Orders were placed and the patient is currently awaiting disposition.    [AJ]   u Mar 07, 2024   0051 EKG interpreted by me  Time: 2121  Heart rate 84  A-fib, left bundle branch block, nonspecific ST changes [MA]      ED Course User Index  [AJ] Jung Jack PA-C  [MA] Aldo Christensen MD       Labs:    Lab Results (last 24 hours)       Procedure Component Value Units Date/Time    COVID-19, FLU A/B, RSV PCR 1 HR TAT - Swab, Nasopharynx [[id number]]  (Normal) Collected: 03/06/24 2118    Specimen: Swab from Nasopharynx Updated: 03/06/24 2211     COVID19 Not Detected     Influenza A PCR Not Detected     Influenza B PCR Not Detected     RSV, PCR Not Detected    Narrative:      Fact sheet for providers: https://www.fda.gov/media/145509/download    Fact sheet for patients: https://www.fda.gov/media/288281/download    Test performed by PCR.    CBC & Differential [[id number]]  (Abnormal) Collected: 03/06/24 2125    Specimen: Blood from Arm, Right Updated: 03/06/24 2135    Narrative:      The following orders were created for panel order CBC & Differential.  Procedure                               Abnormality         Status                     ---------                               -----------         ------                     CBC Auto Differential[[id number]]        Abnormal            Final result                 Please view results for these tests on the individual orders.    Comprehensive Metabolic Panel [[id number]]  (Abnormal) Collected: 03/06/24 2125    Specimen: Blood from Arm, Right Updated: 03/06/24 2200     Glucose 336 mg/dL      BUN 15 mg/dL      Creatinine 1.01 mg/dL      Sodium 137 mmol/L      Potassium 4.1 mmol/L      Chloride 99 mmol/L      CO2 25.1 mmol/L      Calcium 9.3 mg/dL      Total Protein 7.1 g/dL      Albumin 3.8 g/dL      ALT (SGPT) 9 U/L      AST (SGOT) 10 U/L      Alkaline Phosphatase 94 U/L      Total Bilirubin 1.2 mg/dL      Globulin 3.3  gm/dL      A/G Ratio 1.2 g/dL      BUN/Creatinine Ratio 14.9     Anion Gap 12.9 mmol/L      eGFR 63.9 mL/min/1.73     Narrative:      GFR Normal >60  Chronic Kidney Disease <60  Kidney Failure <15      BNP [[id number]]  (Abnormal) Collected: 03/06/24 2125    Specimen: Blood from Arm, Right Updated: 03/06/24 2157     proBNP 13,647.0 pg/mL     Narrative:      This assay is used as an aid in the diagnosis of individuals suspected of having heart failure. It can be used as an aid in the diagnosis of acute decompensated heart failure (ADHF) in patients presenting with signs and symptoms of ADHF to the emergency department (ED). In addition, NT-proBNP of <300 pg/mL indicates ADHF is not likely.    Age Range Result Interpretation  NT-proBNP Concentration (pg/mL:      <50             Positive            >450                   Gray                 300-450                    Negative             <300    50-75           Positive            >900                  Gray                300-900                  Negative            <300      >75             Positive            >1800                  Gray                300-1800                  Negative            <300    Single High Sensitivity Troponin T [[id number]]  (Abnormal) Collected: 03/06/24 2125    Specimen: Blood from Arm, Right Updated: 03/06/24 2200     HS Troponin T 18 ng/L     Narrative:      High Sensitive Troponin T Reference Range:  <14.0 ng/L- Negative Female for AMI  <22.0 ng/L- Negative Male for AMI  >=14 - Abnormal Female indicating possible myocardial injury.  >=22 - Abnormal Male indicating possible myocardial injury.   Clinicians would have to utilize clinical acumen, EKG, Troponin, and serial changes to determine if it is an Acute Myocardial Infarction or myocardial injury due to an underlying chronic condition.         CBC Auto Differential [[id number]]  (Abnormal) Collected: 03/06/24 2125    Specimen: Blood from Arm, Right Updated: 03/06/24 2135     WBC 9.25  10*3/mm3      RBC 5.13 10*6/mm3      Hemoglobin 14.3 g/dL      Hematocrit 46.4 %      MCV 90.4 fL      MCH 27.9 pg      MCHC 30.8 g/dL      RDW 17.1 %      RDW-SD 55.3 fl      MPV 11.3 fL      Platelets 258 10*3/mm3      Neutrophil % 70.2 %      Lymphocyte % 19.4 %      Monocyte % 8.4 %      Eosinophil % 0.9 %      Basophil % 0.9 %      Immature Grans % 0.2 %      Neutrophils, Absolute 6.50 10*3/mm3      Lymphocytes, Absolute 1.79 10*3/mm3      Monocytes, Absolute 0.78 10*3/mm3      Eosinophils, Absolute 0.08 10*3/mm3      Basophils, Absolute 0.08 10*3/mm3      Immature Grans, Absolute 0.02 10*3/mm3      nRBC 0.0 /100 WBC              Imaging:    XR Chest 1 View    Result Date: 3/6/2024  PROCEDURE: XR CHEST 1 VW  COMPARISON: [facility], CR, XR CHEST 2 VW, 1/22/2024, 10:21.  INDICATIONS: SOA Triage Protocol  short of breath  FINDINGS:  Heart is enlarged.  There is vascular congestion with interstitial changes in the lower lungs that may reflect mild edema.  Blunting of the right costophrenic angle may reflect a trace right effusion.  No pneumothorax.  No dense infiltrates.       Cardiomegaly and vascular congestion with evidence of mild edema in the lower lung zones.  Additionally, there may be a trace amount of right pleural fluid.       BENJAMIN MARTINEZ MD       Electronically Signed and Approved By: BENJAMIN MARTINEZ MD on 3/06/2024 at 21:40                Differential Diagnosis and Discussion:    Dyspnea: Differential diagnosis includes but is not limited to metabolic acidosis, neurological disorders, psychogenic, asthma, pneumothorax, upper airway obstruction, COPD, pneumonia, noncardiogenic pulmonary edema, interstitial lung disease, anemia, congestive heart failure, and pulmonary embolism    All labs were reviewed and interpreted by me.  All X-rays impressions were independently interpreted by me.  EKG was interpreted by me.    MDM     Amount and/or Complexity of Data Reviewed  Decide to obtain previous medical  records or to obtain history from someone other than the patient: yes       Patient is a 58-year-old female with a history of CHF who presents with worsening shortness of breath over the last couple weeks.  States that she is having dyspnea on exertion as well as orthopnea.  She does have pulmonary edema noted on chest x-ray.  Will give Lasix here and admit to the hospital for further workup management.          Patient Care Considerations:          Consultants/Shared Management Plan:    Hospitalist: I have discussed the case with Dr. Syed who agrees to accept the patient for admission.    Social Determinants of Health:          Disposition and Care Coordination:    Admit:   Through independent evaluation of the patient's history, physical, and imperical data, the patient meets criteria for inpatient admission to the hospital.        Final diagnoses:   Acute on chronic congestive heart failure, unspecified heart failure type        ED Disposition       ED Disposition   Decision to Admit    Condition   --    Comment   Level of Care: Telemetry [5]   Diagnosis: CHF (congestive heart failure) [762250]   Admitting Physician: PERLA SYED [[id number]]   Attending Physician: PERLA SYED [[id number]]   Certification: I Certify That Inpatient Hospital Services Are Medically Necessary For Greater Than 2 Midnights                 This medical record created using voice recognition software.             Aldo Christensen MD  03/07/24 0108

## 2024-03-07 NOTE — CONSULTS
[facility] ANAT - PODIATRY    Today's Date: 03/07/24    Patient Name: Nasima Dubose  MRN: [id number]  CSN: [id number]  PCP: Jack Muir MD  Referring Provider: Provider, No Known  Attending Provider: Gordon Syed MD  Length of Stay: 0    SUBJECTIVE   Chief Complaint: Painful toenails    HPI: Nasima Dubose, a 58 y.o.female,    Patient was consulted for painful elongated incurvated toenails.    Patient denies any fevers, chills, nausea, vomiting, shortness of breath, nor any other constitutional signs nor symptoms.    Past Medical History:   Diagnosis Date    Anxiety     ANIXETY ATTACK AT WORK ABOUT A MONTH AGO    Arthritis     Condition not found     PSYCHIATRIC PROBLEMS- PT TOOK AN OVERDOSE OF MEDICATION FOR BLOOD SUGAR. TOOK 8 PILLS.    Condition not found     HERBS USED- BC POWDER AS NEEDED    Congestive heart failure (CHF)     COPD (chronic obstructive pulmonary disease)     Diabetes     Diabetic neuropathy     Health care home, active care coordination     [facility] (FAX: [phone])    History of anesthesia reaction     HARD TIME WAKING UP SOMETIME    History of hiatal hernia     History of kidney stones     Hyperlipidemia     Hypertension     Irregular heartbeat     PT IS SEEING  (RECENTLY DID A 24-HOUR HEART MONITOR).    Peripheral artery disease     Stroke     PIN STROKE FROMA MIGRAINE/ 20 YEARS AGO    Wears glasses      Past Surgical History:   Procedure Laterality Date    ABDOMINAL SURGERY      33 HERNIA REPAIR, GALLBLADER REMOVED    BLADDER REPAIR      CHOLECYSTECTOMY      DILATATION AND CURETTAGE      X2    HERNIA REPAIR      HYSTERECTOMY  1992    2 CS    TOE AMPUTATION Right 02/14/2017    RIGHT GREAT TOE    TONSILLECTOMY       Family History   Problem Relation Age of Onset    Diabetes Mother     Anesthesia problems Mother         HARD TIME WAKING UP SOMETIMES      Social History     Socioeconomic History    Marital status:    Tobacco Use    Smoking status:  Every Day     Current packs/day: 0.25     Types: Cigarettes    Smokeless tobacco: Never    Tobacco comments:     QUIT 4 YEARS AGO/ RECENLTY STARTED AGAIN 3 CIGS A DAY    Vaping Use    Vaping status: Never Used   Substance and Sexual Activity    Alcohol use: Not Currently    Drug use: Not Currently    Sexual activity: Defer     Allergies   Allergen Reactions    Aminoglycosides Unknown - Low Severity    Neosporin [Neomycin-Bacitracin Zn-Polymyx] Itching and Rash    Polymyxin B Unknown - Low Severity    Pramoxine Unknown - Low Severity     Current Facility-Administered Medications   Medication Dose Route Frequency Provider Last Rate Last Admin    acetaminophen (TYLENOL) tablet 650 mg  650 mg Oral Q4H PRN Gordon Syed MD        ALPRAZolam (XANAX) tablet 0.25 mg  0.25 mg Oral Q6H PRN Gordon Syed MD   0.25 mg at 03/07/24 1528    aluminum-magnesium hydroxide-simethicone (MAALOX MAX) 400-400-40 MG/5ML suspension 15 mL  15 mL Oral Q6H PRN Gordon Syed MD        sennosides-docusate (PERICOLACE) 8.6-50 MG per tablet 2 tablet  2 tablet Oral BID PRN Gordon Syed MD        And    polyethylene glycol (MIRALAX) packet 17 g  17 g Oral Daily PRN Gordon Syed MD        And    bisacodyl (DULCOLAX) EC tablet 5 mg  5 mg Oral Daily PRN Gordon Syed MD        And    bisacodyl (DULCOLAX) suppository 10 mg  10 mg Rectal Daily PRN Gordon Syed MD        dextrose (D50W) (25 g/50 mL) IV injection 25 g  25 g Intravenous Q15 Min PRN Gordon Syed MD        dextrose (GLUTOSE) oral gel 15 g  15 g Oral Q15 Min PRN Gordon Syed MD        Enoxaparin Sodium (LOVENOX) syringe 40 mg  40 mg Subcutaneous Daily Gordon Syed MD   40 mg at 03/07/24 0815    famotidine (PEPCID) tablet 40 mg  40 mg Oral Daily Gordon Syed MD   40 mg at 03/07/24 0816    furosemide (LASIX) injection 40 mg  40 mg Intravenous Q12H Gorodn Syed MD   40 mg at 03/07/24 0815    gabapentin (NEURONTIN) capsule 800 mg  800 mg Oral Q8H Gordon Syed MD        glucagon (GLUCAGEN) injection 1  mg  1 mg Intramuscular Q15 Min PRN Gordon Syed MD        HYDROcodone-acetaminophen (NORCO) 5-325 MG per tablet 1 tablet  1 tablet Oral Q4H PRN Gordon Syed MD   1 tablet at 03/07/24 1200    Insulin Lispro (humaLOG) injection 2-9 Units  2-9 Units Subcutaneous 4x Daily AC & at Bedtime Gordon Syed MD   2 Units at 03/07/24 1223    ipratropium-albuterol (DUO-NEB) nebulizer solution 3 mL  3 mL Nebulization Q4H - RT Gordon Syed MD   3 mL at 03/07/24 1550    isosorbide mononitrate (IMDUR) 24 hr tablet 60 mg  60 mg Oral BID Gordon Syed MD   60 mg at 03/07/24 0816    metFORMIN (GLUCOPHAGE) tablet 1,000 mg  1,000 mg Oral Daily With Breakfast Gordon Syed MD   1,000 mg at 03/07/24 0816    methylPREDNISolone sodium succinate (SOLU-Medrol) injection 40 mg  40 mg Intravenous Q8H Gordon Syed MD   40 mg at 03/07/24 1200    metoprolol succinate XL (TOPROL-XL) 24 hr tablet 50 mg  50 mg Oral Daily Gordon Syed MD   50 mg at 03/07/24 0816    mineral oil-hydrophilic petrolatum (AQUAPHOR) ointment 1 Application  1 Application Topical BID Gordon Syed MD        morphine injection 2 mg  2 mg Intravenous Q2H PRN Gordon Syed MD        And    naloxone (NARCAN) injection 0.4 mg  0.4 mg Intravenous Q5 Min PRN Gordon Syed MD        nitroglycerin (NITROSTAT) SL tablet 0.4 mg  0.4 mg Sublingual Q5 Min PRN Gordon Syed MD        ondansetron (ZOFRAN) injection 4 mg  4 mg Intravenous Q4H PRN Gordon Syed MD        Pharmacy to Dose enoxaparin (LOVENOX)   Does not apply Continuous PRN Gordon Syed MD        sodium chloride 0.9 % flush 10 mL  10 mL Intravenous PRN Aldo Christensen MD        sodium chloride 0.9 % flush 10 mL  10 mL Intravenous Q12H Gordon Syed MD   10 mL at 03/07/24 0815    sodium chloride 0.9 % flush 10 mL  10 mL Intravenous PRN Gordon Syed MD        sodium chloride 0.9 % infusion 40 mL  40 mL Intravenous PRN Gordon Syed MD        temazepam (RESTORIL) capsule 15 mg  15 mg Oral Nightly PRN Gordon Syed MD         Review of  Systems   Constitutional: Negative.    Skin:         Painful toenails   Neurological:  Positive for numbness.   All other systems reviewed and are negative.      OBJECTIVE     Vitals:    03/07/24 1552   BP:    Pulse: 70   Resp: 18   Temp:    SpO2: 92%       PHYSICAL EXAM  GEN:   A&Ox3, NAD. Pt presents in hospital bed.     Note show the patient being followed by wound care nursing.     Foot/Ankle Exam    GENERAL  Diabetic foot exam performed    Appearance:  chronically ill  Orientation:  AAOx3  Affect:  appropriate    VASCULAR     Right Foot Vascularity   Dorsalis pedis:  1+  Right PT grade: Unable to assess due to intact dressing.  Skin temperature:  cool  Edema grading:  None  CFT:  3  Pedal hair growth:  Absent  Varicosities:  none     Left Foot Vascularity   Dorsalis pedis:  1+  Posterior tibial:  1+  Skin temperature:  cool  Edema grading:  None  CFT:  3  Pedal hair growth:  Absent  Varicosities:  none     NEUROLOGIC     Right Foot Neurologic   Light touch sensation: absent  Vibratory sensation: absent  Hot/Cold sensation: absent  Protective Sensation using Hadley-Obdulio Monofilament:   Sites tested: 10     Left Foot Neurologic   Light touch sensation: absent  Vibratory sensation: absent  Hot/Cold sensation:  absent  Protective Sensation using Hadley-Obdulio Monofilament:   Sites tested: 10    MUSCULOSKELETAL     Right Foot Musculoskeletal    Amputation   Toes amputated: first toe  Hammertoe:  Second toe, third toe and fourth toe    MUSCLE STRENGTH     Right Foot Muscle Strength   Foot dorsiflexion:  4-  Foot plantar flexion:  4-  Foot inversion:  4-  Foot eversion:  4-     Left Foot Muscle Strength   Foot dorsiflexion:  4-  Foot plantar flexion:  4-  Foot inversion:  4-  Foot eversion:  4-    RANGE OF MOTION     Right Foot Range of Motion   Foot and ankle ROM within normal limits       Left Foot Range of Motion   Foot and ankle ROM within normal limits      DERMATOLOGIC      Right Foot Dermatologic    Nails  2.  Positive for elongated, onychomycosis, abnormal thickness, subungual debris and ingrown toenail.  3.  Positive for elongated, onychomycosis, abnormal thickness, subungual debris and ingrown toenail.  4.  Positive for elongated, onychomycosis, abnormal thickness, subungual debris and ingrown toenail.  5.  Positive for elongated, onychomycosis, abnormal thickness, subungual debris and ingrown toenail.  Nails comment:  Toenails 1, 2, 3, 4, and 5     Left Foot Dermatologic   Skin  Left foot skin is intact.   Nails comment:  Toenails 1, 2, 3, 4, and 5  Nails  1.  Positive for elongated, onychomycosis, abnormal thickness, subungual debris and ingrown toenail.  2.  Positive for elongated, onychomycosis, abnormal thickness, subungual debris and ingrown toenail.  3.  Positive for elongated, onychomycosis, abnormal thickness, subungual debris and ingrown toenail.  4.  Positive for elongated, onychomycosis, abnormally thick, subungual debris and ingrown toenail.  5.  Positive for elongated, onychomycosis, abnormally thick, subungual debris and ingrown toenail.     Right foot additional comments: Dressing on the right foot is dry and intact with no breakthrough.       ASSESSMENT/PLAN     Active Hospital Problems:  Active Hospital Problems    Diagnosis     **CHF (congestive heart failure)     Acute pulmonary edema     Onychomycosis     Onychocryptosis     Foot pain, bilateral     Right great toe amputee     Diabetic neuropathy     Uncontrolled diabetes mellitus with hyperglycemia     COPD exacerbation        Comprehensive lower extremity examination and evaluation was performed.    Discussed findings and treatment plan including risks, benefits, and treatment options with patient in detail. Patient agreed with treatment plan.    Toenails 2, 3, 4, 5 on Right and 1, 2, 3, 4, 5 on Left were debrided with nail nippers.  Patient tolerated procedure well without complications.    Diabetic foot exam performed and  documented this date, compliant with CQM required standards. Detail of findings as noted in physical exam.  Lower extremity Neurologic exam for diabetic patient performed and documented this date, compliant with PQRS required standards. Detail of findings as noted in physical exam.  Advised patient importance of good routine lower extremity hygiene. Advised patient importance of evaluating for intact skin and pain free nail borders.  Advised patient to use mirror to evaluate plantar/ soles of feet for better visualization. Advised patient monitor and phone office to be seen if any cracking to skin, open lesions, painful nail borders or if nails become elongated prior to next visit. Advised patient importance of daily cleansing of lower extremities, followed by good skin cream to maintain normal hydration of skin. Also advised patient importance of close daily monitoring of blood sugar. Advised to regulate diet and medications to maintain control of blood sugar in optimal range. Contact primary care provider if difficulties maintaining blood sugar levels.  Advised Patient of presence of Diabetes Mellitus condition.  Advised Patient risk of progression and worsening or improvement, then return of condition.  Will monitor condition for any change in future. Treat with most appropriate treatment pending status of condition.  Counseled and advised patient extensively on nature and ramifications of diabetes. Standard instructions given to patient for good diabetic foot care and maintenance. Advised importance of careful monitoring to avoid break down and complications secondary to diabetes. Advised patient importance of strict maintenance of blood sugar control. Advised patient of possible ominous results from neglect of condition, i.e.: amputation/ loss of digits, feet and legs, or even death.  Patient states understands counseling, will monitor closely, continue good hygiene and routine diabetic foot care. Patient will  contact office is questions or problems.      Patient is to monitor for recurrence and any new symptoms and to contact Dr. Goss's office for a follow-up appointment.      The patient states understanding and agreement with this plan.    Patient is to follow up on an as-needed basis as an outpatient.    Thank you for including me in the care of this patient.    This document has been electronically signed by Papito Goss DPM on March 7, 2024 16:46 EST

## 2024-03-08 LAB
GLUCOSE BLDC GLUCOMTR-MCNC: 373 MG/DL (ref 70–99)
GLUCOSE BLDC GLUCOMTR-MCNC: 375 MG/DL (ref 70–99)
GLUCOSE BLDC GLUCOMTR-MCNC: 397 MG/DL (ref 70–99)
GLUCOSE BLDC GLUCOMTR-MCNC: 432 MG/DL (ref 70–99)

## 2024-03-08 PROCEDURE — 25010000002 METHYLPREDNISOLONE PER 40 MG: Performed by: INTERNAL MEDICINE

## 2024-03-08 PROCEDURE — 97165 OT EVAL LOW COMPLEX 30 MIN: CPT

## 2024-03-08 PROCEDURE — 94799 UNLISTED PULMONARY SVC/PX: CPT

## 2024-03-08 PROCEDURE — 63710000001 INSULIN LISPRO (HUMAN) PER 5 UNITS: Performed by: INTERNAL MEDICINE

## 2024-03-08 PROCEDURE — 82948 REAGENT STRIP/BLOOD GLUCOSE: CPT

## 2024-03-08 PROCEDURE — 25010000002 ENOXAPARIN PER 10 MG: Performed by: INTERNAL MEDICINE

## 2024-03-08 PROCEDURE — 25010000002 FUROSEMIDE PER 20 MG: Performed by: INTERNAL MEDICINE

## 2024-03-08 PROCEDURE — 94664 DEMO&/EVAL PT USE INHALER: CPT

## 2024-03-08 PROCEDURE — 63710000001 INSULIN DETEMIR PER 5 UNITS: Performed by: INTERNAL MEDICINE

## 2024-03-08 PROCEDURE — 97161 PT EVAL LOW COMPLEX 20 MIN: CPT

## 2024-03-08 RX ORDER — LUBIPROSTONE 8 UG/1
8 CAPSULE ORAL 2 TIMES DAILY
Status: DISCONTINUED | OUTPATIENT
Start: 2024-03-08 | End: 2024-03-13 | Stop reason: HOSPADM

## 2024-03-08 RX ORDER — PRASUGREL 10 MG/1
10 TABLET, FILM COATED ORAL DAILY
Status: DISCONTINUED | OUTPATIENT
Start: 2024-03-08 | End: 2024-03-13 | Stop reason: HOSPADM

## 2024-03-08 RX ORDER — HYDROXYZINE HYDROCHLORIDE 25 MG/1
25 TABLET, FILM COATED ORAL 2 TIMES DAILY
Status: DISCONTINUED | OUTPATIENT
Start: 2024-03-08 | End: 2024-03-13 | Stop reason: HOSPADM

## 2024-03-08 RX ORDER — CETIRIZINE HYDROCHLORIDE 10 MG/1
10 TABLET ORAL DAILY
Status: DISCONTINUED | OUTPATIENT
Start: 2024-03-08 | End: 2024-03-13 | Stop reason: HOSPADM

## 2024-03-08 RX ADMIN — IPRATROPIUM BROMIDE AND ALBUTEROL SULFATE 3 ML: .5; 3 SOLUTION RESPIRATORY (INHALATION) at 11:08

## 2024-03-08 RX ADMIN — IPRATROPIUM BROMIDE AND ALBUTEROL SULFATE 3 ML: .5; 3 SOLUTION RESPIRATORY (INHALATION) at 03:33

## 2024-03-08 RX ADMIN — METHYLPREDNISOLONE SODIUM SUCCINATE 40 MG: 40 INJECTION INTRAMUSCULAR; INTRAVENOUS at 17:20

## 2024-03-08 RX ADMIN — IPRATROPIUM BROMIDE AND ALBUTEROL SULFATE 3 ML: .5; 3 SOLUTION RESPIRATORY (INHALATION) at 00:29

## 2024-03-08 RX ADMIN — WHITE PETROLATUM 1 APPLICATION: 1.75 OINTMENT TOPICAL at 08:19

## 2024-03-08 RX ADMIN — METFORMIN HYDROCHLORIDE 1000 MG: 500 TABLET, FILM COATED ORAL at 08:19

## 2024-03-08 RX ADMIN — INSULIN LISPRO 8 UNITS: 100 INJECTION, SOLUTION INTRAVENOUS; SUBCUTANEOUS at 17:20

## 2024-03-08 RX ADMIN — LUBIPROSTONE 8 MCG: 8 CAPSULE, GELATIN COATED ORAL at 20:30

## 2024-03-08 RX ADMIN — IPRATROPIUM BROMIDE AND ALBUTEROL SULFATE 3 ML: .5; 3 SOLUTION RESPIRATORY (INHALATION) at 06:23

## 2024-03-08 RX ADMIN — HYDROCODONE BITARTRATE AND ACETAMINOPHEN 1 TABLET: 5; 325 TABLET ORAL at 06:06

## 2024-03-08 RX ADMIN — GABAPENTIN 800 MG: 400 CAPSULE ORAL at 06:06

## 2024-03-08 RX ADMIN — METHYLPREDNISOLONE SODIUM SUCCINATE 40 MG: 40 INJECTION INTRAMUSCULAR; INTRAVENOUS at 10:33

## 2024-03-08 RX ADMIN — GABAPENTIN 800 MG: 400 CAPSULE ORAL at 15:18

## 2024-03-08 RX ADMIN — ISOSORBIDE MONONITRATE 60 MG: 60 TABLET, EXTENDED RELEASE ORAL at 08:19

## 2024-03-08 RX ADMIN — ISOSORBIDE MONONITRATE 60 MG: 60 TABLET, EXTENDED RELEASE ORAL at 20:30

## 2024-03-08 RX ADMIN — Medication 10 ML: at 08:19

## 2024-03-08 RX ADMIN — IPRATROPIUM BROMIDE AND ALBUTEROL SULFATE 3 ML: .5; 3 SOLUTION RESPIRATORY (INHALATION) at 15:10

## 2024-03-08 RX ADMIN — METOPROLOL SUCCINATE 50 MG: 50 TABLET, EXTENDED RELEASE ORAL at 08:19

## 2024-03-08 RX ADMIN — INSULIN LISPRO 8 UNITS: 100 INJECTION, SOLUTION INTRAVENOUS; SUBCUTANEOUS at 20:38

## 2024-03-08 RX ADMIN — IPRATROPIUM BROMIDE AND ALBUTEROL SULFATE 3 ML: .5; 3 SOLUTION RESPIRATORY (INHALATION) at 18:32

## 2024-03-08 RX ADMIN — GABAPENTIN 800 MG: 400 CAPSULE ORAL at 22:06

## 2024-03-08 RX ADMIN — FUROSEMIDE 40 MG: 10 INJECTION, SOLUTION INTRAMUSCULAR; INTRAVENOUS at 20:30

## 2024-03-08 RX ADMIN — WHITE PETROLATUM 1 APPLICATION: 1.75 OINTMENT TOPICAL at 20:30

## 2024-03-08 RX ADMIN — Medication 10 ML: at 20:31

## 2024-03-08 RX ADMIN — FUROSEMIDE 40 MG: 10 INJECTION, SOLUTION INTRAMUSCULAR; INTRAVENOUS at 08:18

## 2024-03-08 RX ADMIN — INSULIN LISPRO 9 UNITS: 100 INJECTION, SOLUTION INTRAVENOUS; SUBCUTANEOUS at 11:45

## 2024-03-08 RX ADMIN — INSULIN DETEMIR 30 UNITS: 100 INJECTION, SOLUTION SUBCUTANEOUS at 10:33

## 2024-03-08 RX ADMIN — INSULIN LISPRO 7 UNITS: 100 INJECTION, SOLUTION INTRAVENOUS; SUBCUTANEOUS at 08:18

## 2024-03-08 RX ADMIN — FAMOTIDINE 40 MG: 20 TABLET ORAL at 08:19

## 2024-03-08 RX ADMIN — HYDROCODONE BITARTRATE AND ACETAMINOPHEN 1 TABLET: 5; 325 TABLET ORAL at 01:05

## 2024-03-08 RX ADMIN — HYDROCODONE BITARTRATE AND ACETAMINOPHEN 1 TABLET: 5; 325 TABLET ORAL at 15:21

## 2024-03-08 RX ADMIN — ENOXAPARIN SODIUM 40 MG: 100 INJECTION SUBCUTANEOUS at 08:18

## 2024-03-08 RX ADMIN — PRASUGREL 10 MG: 10 TABLET, FILM COATED ORAL at 11:41

## 2024-03-08 RX ADMIN — CETIRIZINE HYDROCHLORIDE 10 MG: 10 TABLET, FILM COATED ORAL at 10:33

## 2024-03-08 RX ADMIN — HYDROCODONE BITARTRATE AND ACETAMINOPHEN 1 TABLET: 5; 325 TABLET ORAL at 22:06

## 2024-03-08 RX ADMIN — METHYLPREDNISOLONE SODIUM SUCCINATE 40 MG: 40 INJECTION INTRAMUSCULAR; INTRAVENOUS at 02:06

## 2024-03-08 NOTE — CONSULTS
SW met with patient at the bedside to complete advanced care planning consult. Patient wished to complete a living will at the bedside. SW completed this with patient and faxed copy to HIMs, Living will has been placed on patient's chart. No other needs identified.

## 2024-03-08 NOTE — PLAN OF CARE
Goal Outcome Evaluation:  Plan of Care Reviewed With: patient        Progress: improving  Outcome Evaluation: VSS.  afib on telemetry.  good urine output with iv lasix.  medicated for pain per MAR; c/o pain to bilateral legs, back and hips.

## 2024-03-08 NOTE — THERAPY EVALUATION
Patient Name: Nasima Dubose  : 1965    MRN: [id number]                              Today's Date: 3/8/2024       Admit Date: 3/7/2024    Visit Dx:     ICD-10-CM ICD-9-CM   1. Acute on chronic congestive heart failure, unspecified heart failure type  I50.9 428.0   2. Difficulty walking  R26.2 719.7   3. Decreased activities of daily living (ADL)  Z78.9 V49.89     Patient Active Problem List   Diagnosis    COPD exacerbation    Multifocal pneumonia    Uncontrolled diabetes mellitus with hyperglycemia    Cytokine release syndrome, grade 2    CHF (congestive heart failure)    Acute pulmonary edema    Diabetic neuropathy    Onychomycosis    Onychocryptosis    Foot pain, bilateral    Right great toe amputee     Past Medical History:   Diagnosis Date    Anxiety     ANIXETY ATTACK AT WORK ABOUT A MONTH AGO    Arthritis     Condition not found     PSYCHIATRIC PROBLEMS- PT TOOK AN OVERDOSE OF MEDICATION FOR BLOOD SUGAR. TOOK 8 PILLS.    Condition not found     HERBS USED- BC POWDER AS NEEDED    Congestive heart failure (CHF)     COPD (chronic obstructive pulmonary disease)     Diabetes     Diabetic neuropathy     Health care home, active care coordination     [facility] (FAX: [phone])    History of anesthesia reaction     HARD TIME WAKING UP SOMETIME    History of hiatal hernia     History of kidney stones     Hyperlipidemia     Hypertension     Irregular heartbeat     PT IS SEEING  (RECENTLY DID A 24-HOUR HEART MONITOR).    Peripheral artery disease     Stroke     PIN STROKE FROMA MIGRAINE/ 20 YEARS AGO    Wears glasses      Past Surgical History:   Procedure Laterality Date    ABDOMINAL SURGERY      33 HERNIA REPAIR, GALLBLADER REMOVED    BLADDER REPAIR      CHOLECYSTECTOMY      DILATATION AND CURETTAGE      X2    HERNIA REPAIR      HYSTERECTOMY  1992    2 CS    TOE AMPUTATION Right 2017    RIGHT GREAT TOE    TONSILLECTOMY        General Information       Row Name 24 1346          OT  Time and Intention    Document Type evaluation  -     Mode of Treatment individual therapy;occupational therapy  -       Row Name 03/08/24 1346          General Information    Patient Profile Reviewed yes  -     Prior Level of Function --  (I) with ADLs, ambulated w/o a device typically but uses a RW at night time, has a step over tub where she typically stands to shower but has shower chair available, standard commode, stands to groom, no longer drives, and no home O2.  -     Existing Precautions/Restrictions fall  -     Barriers to Rehab none identified  -       Row Name 03/08/24 1346          Occupational Profile    Reason for Services/Referral (Occupational Profile) Patient is a 58 year old male admitted to [facility] for shortness of breath, cough, and chest pressure on March 7th, 2024. Occupational therapy consulted due to recent decline in ADLs/functional transfers. No previous occupational therapy services for current condition.  -       Row Name 03/08/24 1346          Living Environment    People in Home child(ashleigh), adult  son  -       Row Name 03/08/24 1346          Home Main Entrance    Number of Stairs, Main Entrance three  -LF     Stair Railings, Main Entrance railings on both sides of stairs  -       Row Name 03/08/24 1346          Cognition    Orientation Status (Cognition) oriented to;person  Intermittently confused  -       Row Name 03/08/24 1346          Safety Issues, Functional Mobility    Safety Issues Affecting Function (Mobility) awareness of need for assistance;insight into deficits/self-awareness;judgment;problem-solving;sequencing abilities  -     Impairments Affecting Function (Mobility) balance;endurance/activity tolerance  -               User Key  (r) = Recorded By, (t) = Taken By, (c) = Cosigned By      Initials Name Provider Type     Sarah Jon OT Occupational Therapist                     Mobility/ADL's       Row Name 03/08/24 4279           Bed Mobility    Bed Mobility supine-sit;sit-supine  -LF     Supine-Sit [place] (Bed Mobility) supervision  -LF     Sit-Supine [place] (Bed Mobility) supervision  -LF     Assistive Device (Bed Mobility) bed rails  -LF       Row Name 03/08/24 1349          Transfers    Transfers sit-stand transfer;stand-sit transfer  -LF       Row Name 03/08/24 1349          Sit-Stand Transfer    Sit-Stand [place] (Transfers) standby assist  -LF     Assistive Device (Sit-Stand Transfers) other (see comments)  Hand-held  -LF       Row Name 03/08/24 1349          Stand-Sit Transfer    Stand-Sit [place] (Transfers) standby assist  -LF     Assistive Device (Stand-Sit Transfers) other (see comments)  Hand-held  -LF       Row Name 03/08/24 1349          Functional Mobility    Functional Mobility- Ind. Level standby assist  -LF     Functional Mobility- Device other (see comments)  Hand-held  -LF       Row Name 03/08/24 1349          Activities of Daily Living    BADL Assessment/Intervention bathing;upper body dressing;lower body dressing;grooming;feeding;toileting  -       Row Name 03/08/24 1349          Bathing Assessment/Intervention    [place] Level (Bathing) bathing skills;upper body;set up;lower body;standby assist  -       Row Name 03/08/24 1349          Upper Body Dressing Assessment/Training    [place] Level (Upper Body Dressing) upper body dressing skills;set up  -       Row Name 03/08/24 1349          Lower Body Dressing Assessment/Training    [place] Level (Lower Body Dressing) lower body dressing skills;standby assist  -       Row Name 03/08/24 1349          Grooming Assessment/Training    [place] Level (Grooming) grooming skills;set up  -       Row Name 03/08/24 1349          Self-Feeding Assessment/Training    [place] Level (Feeding) feeding skills;set up  -       Row Name 03/08/24 1349          Toileting Assessment/Training    [place] Level (Toileting) toileting  skills;standby assist  -LF               User Key  (r) = Recorded By, (t) = Taken By, (c) = Cosigned By      Initials Name Provider Type     Sarah Jon OT Occupational Therapist                   Obj/Interventions       Row Name 03/08/24 1351          Sensory Assessment (Somatosensory)    Sensory Assessment (Somatosensory) UE sensation intact  -       Row Name 03/08/24 1351          Vision Assessment/Intervention    Visual Impairment/Limitations corrective lenses full-time  Reports diminished bilateral peripheral vision d/t prior CVA.  -LF       Row Name 03/08/24 1351          Range of Motion Comprehensive    General Range of Motion bilateral upper extremity ROM WFL  -LF       Row Name 03/08/24 1351          Strength Comprehensive (MMT)    Comment, General Manual Muscle Testing (MMT) Assessment 4+/5  -LF       Row Name 03/08/24 1351          Motor Skills    Motor Skills coordination;functional endurance  -LF     Coordination bilateral;upper extremity;WFL  -LF     Functional Endurance Fair  -LF       Row Name 03/08/24 1351          Balance    Balance Assessment sitting dynamic balance;standing dynamic balance  -LF     Dynamic Sitting Balance supervision  -LF     Position, Sitting Balance unsupported;sitting edge of bed  -LF     Dynamic Standing Balance standby assist  -LF     Position/Device Used, Standing Balance supported;other (see comments)  Hand-held  -LF               User Key  (r) = Recorded By, (t) = Taken By, (c) = Cosigned By      Initials Name Provider Type     Sarah Jon OT Occupational Therapist                   Goals/Plan       Row Name 03/08/24 1352          Bed Mobility Goal 1 (OT)    Activity/Assistive Device (Bed Mobility Goal 1, OT) bed mobility activities, all  -LF     [place] Level/Cues Needed (Bed Mobility Goal 1, OT) independent  -LF     Time Frame (Bed Mobility Goal 1, OT) long term goal (LTG);10 days  -       Row Name 03/08/24 1352          Transfer Goal 1 (OT)     Activity/Assistive Device (Transfer Goal 1, OT) transfers, all  -LF     [place] Level/Cues Needed (Transfer Goal 1, OT) independent  -LF     Time Frame (Transfer Goal 1, OT) long term goal (LTG);10 days  -       Row Name 03/08/24 1352          Bathing Goal 1 (OT)    Activity/Device (Bathing Goal 1, OT) bathing skills, all  -LF     [place] Level/Cues Needed (Bathing Goal 1, OT) independent  -LF     Time Frame (Bathing Goal 1, OT) long term goal (LTG);10 days  -       Row Name 03/08/24 1352          Dressing Goal 1 (OT)    Activity/Device (Dressing Goal 1, OT) dressing skills, all  -LF     [place]/Cues Needed (Dressing Goal 1, OT) independent  -LF     Time Frame (Dressing Goal 1, OT) long term goal (LTG);10 days  -       Row Name 03/08/24 1352          Toileting Goal 1 (OT)    Activity/Device (Toileting Goal 1, OT) toileting skills, all  -LF     [place] Level/Cues Needed (Toileting Goal 1, OT) independent  -LF     Time Frame (Toileting Goal 1, OT) long term goal (LTG);10 days  -       Row Name 03/08/24 1352          Problem Specific Goal 1 (OT)    Problem Specific Goal 1 (OT) Patient will demonstrate good endurance to support ADLs/functional transfers.  -LF     Time Frame (Problem Specific Goal 1, OT) long term goal (LTG);10 days  -       Row Name 03/08/24 1352          Therapy Assessment/Plan (OT)    Planned Therapy Interventions (OT) activity tolerance training;BADL retraining;functional balance retraining;occupation/activity based interventions;patient/caregiver education/training;strengthening exercise;transfer/mobility retraining  -               User Key  (r) = Recorded By, (t) = Taken By, (c) = Cosigned By      Initials Name Provider Type     Sarah Jon OT Occupational Therapist                   Clinical Impression       Row Name 03/08/24 1351          Pain Assessment    Additional Documentation Pain Scale: FACES Pre/Post-Treatment (Group)  -       Row Name 03/08/24  1351          Plan of Care Review    Plan of Care Reviewed With patient  -LF     Progress no change  -     Outcome Evaluation Patient presents with limitations in self-care, functional transfers, balance, and endurance. She would benefit from continued skilled occupational therapy services to maximize independence with ADLs/functional transfers.  -       Row Name 03/08/24 1351          Therapy Assessment/Plan (OT)    Patient/Family Therapy Goal Statement (OT) To maximize independence.  -     Rehab Potential (OT) good, to achieve stated therapy goals  -     Criteria for Skilled Therapeutic Interventions Met (OT) yes;meets criteria;skilled treatment is necessary  -LF     Therapy Frequency (OT) 5 times/wk  -LF       Row Name 03/08/24 1351          Therapy Plan Review/Discharge Plan (OT)    Anticipated Discharge Disposition (OT) home with assist  -       Row Name 03/08/24 1351          Vital Signs    O2 Delivery Pre Treatment room air  -LF     O2 Delivery Intra Treatment room air  -LF     O2 Delivery Post Treatment room air  -       Row Name 03/08/24 1351          Positioning and Restraints    Pre-Treatment Position in bed  -LF     Post Treatment Position bed  -LF     In Bed fowlers;call light within reach;encouraged to call for assist;exit alarm on  -LF               User Key  (r) = Recorded By, (t) = Taken By, (c) = Cosigned By      Initials Name Provider Type     Sarah Jon, OT Occupational Therapist                   Outcome Measures       Row Name 03/08/24 1352          How much help from another is currently needed...    Putting on and taking off regular lower body clothing? 3  -LF     Bathing (including washing, rinsing, and drying) 3  -LF     Toileting (which includes using toilet bed pan or urinal) 3  -LF     Putting on and taking off regular upper body clothing 4  -LF     Taking care of personal grooming (such as brushing teeth) 4  -LF     Eating meals 4  -LF     AM-PAC 6 Clicks Score (OT)  21  -LF       Row Name 03/08/24 1300 03/08/24 0840       How much help from another person do you currently need...    Turning from your back to your side while in flat bed without using bedrails? 4  -DP 4  -AR    Moving from lying on back to sitting on the side of a flat bed without bedrails? 4  -DP 4  -AR    Moving to and from a bed to a chair (including a wheelchair)? 4  -DP 4  -AR    Standing up from a chair using your arms (e.g., wheelchair, bedside chair)? 4  -DP 4  -AR    Climbing 3-5 steps with a railing? 4  -DP 3  -AR    To walk in hospital room? 4  -DP 4  -AR    AM-PAC 6 Clicks Score (PT) 24  -DP 23  -AR    Highest Level of Mobility Goal 8 --> Walked 250 feet or more  -DP 7 --> Walk 25 feet or more  -AR      Row Name 03/08/24 1352 03/08/24 1300       Functional Assessment    Outcome Measure Options AM-PAC 6 Clicks Daily Activity (OT);Optimal Instrument  -LF AM-PAC 6 Clicks Basic Mobility (PT)  -DP      Row Name 03/08/24 1352          Optimal Instrument    Optimal Instrument Optimal - 3  -LF     Bending/Stooping 2  -LF     Standing 2  -LF     Reaching 1  -LF     From the list, choose the 3 activities you would most like to be able to do without any difficulty Bending/stooping;Standing;Reaching  -LF     Total Score Optimal - 3 5  -LF               User Key  (r) = Recorded By, (t) = Taken By, (c) = Cosigned By      Initials Name Provider Type    Piedad Washington, RN Registered Nurse    Sarah Jeronimo OT Occupational Therapist    Matthew Mars, PT Physical Therapist                    Occupational Therapy Education       Title: PT OT SLP Therapies (In Progress)       Topic: Occupational Therapy (In Progress)       Point: ADL training (In Progress)       Description:   Instruct learner(s) on proper safety adaptation and remediation techniques during self care or transfers.   Instruct in proper use of assistive devices.                  Learning Progress Summary             Patient Acceptance, E,TB,  NR by  at 3/8/2024 1353                         Point: Precautions (In Progress)       Description:   Instruct learner(s) on prescribed precautions during self-care and functional transfers.                  Learning Progress Summary             Patient Acceptance, E,TB, NR by  at 3/8/2024 1353                         Point: Body mechanics (In Progress)       Description:   Instruct learner(s) on proper positioning and spine alignment during self-care, functional mobility activities and/or exercises.                  Learning Progress Summary             Patient Acceptance, E,TB, NR by  at 3/8/2024 1353                                         User Key       Initials Effective Dates Name Provider Type Discipline     06/16/21 -  Sarah Jon OT Occupational Therapist OT                  OT Recommendation and Plan  Planned Therapy Interventions (OT): activity tolerance training, BADL retraining, functional balance retraining, occupation/activity based interventions, patient/caregiver education/training, strengthening exercise, transfer/mobility retraining  Therapy Frequency (OT): 5 times/wk  Plan of Care Review  Plan of Care Reviewed With: patient  Progress: no change  Outcome Evaluation: Patient presents with limitations in self-care, functional transfers, balance, and endurance. She would benefit from continued skilled occupational therapy services to maximize independence with ADLs/functional transfers.     Time Calculation:   Evaluation Complexity (OT)  Review Occupational Profile/Medical/Therapy History Complexity: brief/low complexity  Assessment, Occupational Performance/Identification of Deficit Complexity: 3-5 performance deficits  Clinical Decision Making Complexity (OT): problem focused assessment/low complexity  Overall Complexity of Evaluation (OT): low complexity     Time Calculation- OT       Row Name 03/08/24 1353             Time Calculation- OT    OT Received On 03/08/24  -      OT Goal  Re-Cert Due Date 03/17/24  -LF         Untimed Charges    OT Eval/Re-eval Minutes 47  -LF         Total Minutes    Untimed Charges Total Minutes 47  -LF       Total Minutes 47  -LF                User Key  (r) = Recorded By, (t) = Taken By, (c) = Cosigned By      Initials Name Provider Type    LF Sarah Jon OT Occupational Therapist                  Therapy Charges for Today       Code Description Service Date Service Provider Modifiers Qty    [id number] HC OT EVAL LOW COMPLEXITY 4 3/8/2024 Sarah Jon OT GO 1                 Sarah Jon OT  3/8/2024

## 2024-03-08 NOTE — PLAN OF CARE
Goal Outcome Evaluation:  Plan of Care Reviewed With: patient           Outcome Evaluation: Patient is able to complete all transfers and bed mobilities indepedently in the room. Pt is able to ambulate ad ajay in the room. Pt does not need inpatient PT services. Recommend DC home.      Anticipated Discharge Disposition (PT): home

## 2024-03-08 NOTE — THERAPY EVALUATION
Acute Care - Physical Therapy Initial Evaluation   [place]     Patient Name: Nasima Dubose  : 1965  MRN: [id number]  Today's Date: 3/8/2024      Visit Dx:     ICD-10-CM ICD-9-CM   1. Acute on chronic congestive heart failure, unspecified heart failure type  I50.9 428.0   2. Difficulty walking  R26.2 719.7     Patient Active Problem List   Diagnosis    COPD exacerbation    Multifocal pneumonia    Uncontrolled diabetes mellitus with hyperglycemia    Cytokine release syndrome, grade 2    CHF (congestive heart failure)    Acute pulmonary edema    Diabetic neuropathy    Onychomycosis    Onychocryptosis    Foot pain, bilateral    Right great toe amputee     Past Medical History:   Diagnosis Date    Anxiety     ANIXETY ATTACK AT WORK ABOUT A MONTH AGO    Arthritis     Condition not found     PSYCHIATRIC PROBLEMS- PT TOOK AN OVERDOSE OF MEDICATION FOR BLOOD SUGAR. TOOK 8 PILLS.    Condition not found     HERBS USED- BC POWDER AS NEEDED    Congestive heart failure (CHF)     COPD (chronic obstructive pulmonary disease)     Diabetes     Diabetic neuropathy     Health care home, active care coordination     [facility] (FAX: [phone])    History of anesthesia reaction     HARD TIME WAKING UP SOMETIME    History of hiatal hernia     History of kidney stones     Hyperlipidemia     Hypertension     Irregular heartbeat     PT IS SEEING  (RECENTLY DID A 24-HOUR HEART MONITOR).    Peripheral artery disease     Stroke     PIN STROKE FROMA MIGRAINE/ 20 YEARS AGO    Wears glasses      Past Surgical History:   Procedure Laterality Date    ABDOMINAL SURGERY      33 HERNIA REPAIR, GALLBLADER REMOVED    BLADDER REPAIR      CHOLECYSTECTOMY      DILATATION AND CURETTAGE      X2    HERNIA REPAIR      HYSTERECTOMY      2 CS    TOE AMPUTATION Right 2017    RIGHT GREAT TOE    TONSILLECTOMY       PT Assessment (Last 12 Hours)       PT Evaluation and Treatment       Row Name 24 1300          Physical Therapy  Time and Intention    Subjective Information no complaints  -DP     Document Type evaluation  -DP     Mode of Treatment individual therapy;physical therapy  -DP     Patient Effort good  -DP       Row Name 03/08/24 1300          General Information    Patient Profile Reviewed yes  -DP     Patient Observations alert;cooperative;agree to therapy  -DP     Prior Level of Function independent:;gait;transfer;bed mobility;ADL's  -DP     Existing Precautions/Restrictions no known precautions/restrictions  -DP     Barriers to Rehab none identified  -DP       Row Name 03/08/24 1300          Living Environment    Current Living Arrangements home  -DP     People in Home child(ashleigh), adult  -DP     Primary Care Provided by self  -DP       Row Name 03/08/24 1300          Cognition    Orientation Status (Cognition) oriented x 3  -DP       Row Name 03/08/24 1300          Range of Motion Comprehensive    General Range of Motion bilateral lower extremity ROM WFL  -DP       Row Name 03/08/24 1300          Strength (Manual Muscle Testing)    Strength (Manual Muscle Testing) bilateral lower extremities;strength is WFL  -DP       Row Name 03/08/24 1300          Bed Mobility    Bed Mobility supine-sit-supine  -DP     Supine-Sit-Supine [place] (Bed Mobility) supervision  -DP       Row Name 03/08/24 1300          Transfers    Transfers sit-stand transfer  -DP       Row Name 03/08/24 1300          Sit-Stand Transfer    Sit-Stand [place] (Transfers) supervision  -DP       Row Name 03/08/24 1300          Gait/Stairs (Locomotion)    Gait/Stairs Locomotion gait/ambulation independence  -DP     [place] Level (Gait) supervision  -DP     Assistive Device (Gait) --  none  -DP     Patient was able to Ambulate yes  -DP     Distance in Feet (Gait) 30  -DP     Comment, (Gait/Stairs) Pt is able to ambulate ad lbi in her room  -DP       Row Name 03/08/24 1300          Balance    Balance Assessment standing dynamic balance  -DP     Dynamic  Standing Balance supervision  -DP       Row Name             Wound 03/07/24 0605 Right posterior plantar    Wound - Properties Group Placement Date: 03/07/24  -YI Placement Time: 0605  -YI Present on Original Admission: Y  -YI Side: Right  -YI Orientation: posterior  -YI Location: plantar  -YI    Retired Wound - Properties Group Placement Date: 03/07/24  -YI Placement Time: 0605  -YI Present on Original Admission: Y  -YI Side: Right  -YI Orientation: posterior  -YI Location: plantar  -YI    Retired Wound - Properties Group Date first assessed: 03/07/24  -YI Time first assessed: 0605  -YI Present on Original Admission: Y  -YI Side: Right  -YI Location: plantar  -YI      Row Name 03/08/24 1300          Plan of Care Review    Plan of Care Reviewed With patient  -DP     Outcome Evaluation Patient is able to complete all transfers and bed mobilities indepedently in the room. Pt is able to ambulate ad ajay in the room. Pt does not need inpatient PT services. Recommend DC home.  -DP       Row Name 03/08/24 1300          Therapy Assessment/Plan (PT)    Criteria for Skilled Interventions Met (PT) no problems identified which require skilled intervention  -DP     Therapy Frequency (PT) evaluation only  -DP       Row Name 03/08/24 1300          PT Evaluation Complexity    History, PT Evaluation Complexity no personal factors and/or comorbidities  -DP     Examination of Body Systems (PT Eval Complexity) total of 4 or more elements  -DP     Clinical Presentation (PT Evaluation Complexity) stable  -DP     Clinical Decision Making (PT Evaluation Complexity) low complexity  -DP     Overall Complexity (PT Evaluation Complexity) low complexity  -DP       Row Name 03/08/24 1300          Physical Therapy Goals    Problem Specific Goal Selection (PT) problem specific goal 1, PT  -DP       Row Name 03/08/24 1300          Problem Specific Goal 1 (PT)    Problem Specific Goal 1 (PT) Complete PT evaluation.  -DP     Time Frame (Problem  Specific Goal 1, PT) 1 day  -DP     Progress/Outcome (Problem Specific Goal 1, PT) goal met  -DP               User Key  (r) = Recorded By, (t) = Taken By, (c) = Cosigned By      Initials Name Provider Type    Salvatore Benito, RN Registered Nurse    Matthew Mars PT Physical Therapist                      PT Recommendation and Plan  Anticipated Discharge Disposition (PT): home  Therapy Frequency (PT): evaluation only  Plan of Care Reviewed With: patient  Outcome Evaluation: Patient is able to complete all transfers and bed mobilities indepedently in the room. Pt is able to ambulate ad ajay in the room. Pt does not need inpatient PT services. Recommend DC home.   Outcome Measures       Row Name 03/08/24 1300             How much help from another person do you currently need...    Turning from your back to your side while in flat bed without using bedrails? 4  -DP      Moving from lying on back to sitting on the side of a flat bed without bedrails? 4  -DP      Moving to and from a bed to a chair (including a wheelchair)? 4  -DP      Standing up from a chair using your arms (e.g., wheelchair, bedside chair)? 4  -DP      Climbing 3-5 steps with a railing? 4  -DP      To walk in hospital room? 4  -DP      AM-PAC 6 Clicks Score (PT) 24  -DP      Highest Level of Mobility Goal 8 --> Walked 250 feet or more  -DP         Functional Assessment    Outcome Measure Options AM-PAC 6 Clicks Basic Mobility (PT)  -DP                User Key  (r) = Recorded By, (t) = Taken By, (c) = Cosigned By      Initials Name Provider Type    Matthew Mars PT Physical Therapist                     Time Calculation:    PT Charges       Row Name 03/08/24 1327             Time Calculation    PT Received On 03/08/24  -DP         Untimed Charges    PT Eval/Re-eval Minutes 25  -DP         Total Minutes    Untimed Charges Total Minutes 25  -DP       Total Minutes 25  -DP                User Key  (r) = Recorded By, (t) = Taken By, (c) =  Cosigned By      Initials Name Provider Type    DP Matthew August, PT Physical Therapist                      PT G-Codes  Outcome Measure Options: AM-PAC 6 Clicks Basic Mobility (PT)  AM-PAC 6 Clicks Score (PT): 24    Matthew August PT  3/8/2024

## 2024-03-08 NOTE — PLAN OF CARE
Goal Outcome Evaluation:  Plan of Care Reviewed With: patient        Progress: no change  Outcome Evaluation: Patient presents with limitations in self-care, functional transfers, balance, and endurance. She would benefit from continued skilled occupational therapy services to maximize independence with ADLs/functional transfers.      Anticipated Discharge Disposition (OT): home with assist

## 2024-03-08 NOTE — PROGRESS NOTES
[facility]     Progress Note    Patient Name: Nasima Dubose  : 1965  MRN: [id number]  Primary Care Physician:  Jack Muir MD  Date of admission: 3/7/2024      Subjective   Brief summary.  Patient admitted with shortness of breath      HPI:  Follow-up on shortness of breath, feeling better, less short of breath, swelling of the leg coming down.  Venous Doppler negative for DVT.  No chest pain.  Sugars running high    Review of Systems     No fever or chills.  High sugars.  No dizziness.  Shortness of breath better, less wheezy      Objective     Vitals:   Temp:  [97.5 °F (36.4 °C)-98.6 °F (37 °C)] 97.7 °F (36.5 °C)  Heart Rate:  [70-99] 90  Resp:  [16-20] 18  BP: (138-158)/(86-98) 151/95    Physical Exam :     Elderly looking female not in acute distress.  Neck supple without any JVD.  Heart regular.  Lungs diminished breath sounds but clear today.  No rhonchi or rales.  Abdomen soft.  Obese.  Extremities with edema 2+ on the right side.  Chronic.    Result Review:  I have personally reviewed the results from the time of this admission to 3/8/2024 08:43 EST and agree with these findings:  [x]  Laboratory  []  Microbiology  []  Radiology  []  EKG/Telemetry   []  Cardiology/Vascular   []  Pathology  []  Old records  []  Other:           Assessment / Plan       Active Hospital Problems:  Active Hospital Problems    Diagnosis     **CHF (congestive heart failure)     Acute pulmonary edema     Onychomycosis     Onychocryptosis     Foot pain, bilateral     Right great toe amputee     Diabetic neuropathy     Uncontrolled diabetes mellitus with hyperglycemia     COPD exacerbation        Plan:   Respiratory status improving, breathing better.  Patient appears to have combination of COPD and CHF, continue steroids.  Taper from tomorrow if continues to improve.  Also sugars running high due to steroids will restart long-acting insulin, continue sliding scale coverage.  Restart essential home meds.    If  continues to improve and remained stable possible discharge in couple of days.    DVT prophylaxis:  Medical DVT prophylaxis orders are present.        CODE STATUS:   Code Status (Patient has no pulse and is not breathing): CPR (Attempt to Resuscitate)  Medical Interventions (Patient has pulse or is breathing): Full Support            Electronically signed by Gordon Syed MD, 03/08/24, 8:43 AM EST.

## 2024-03-09 LAB
ALBUMIN SERPL-MCNC: 3.8 G/DL (ref 3.5–5.2)
ALBUMIN/GLOB SERPL: 1.2 G/DL
ALP SERPL-CCNC: 86 U/L (ref 39–117)
ALT SERPL W P-5'-P-CCNC: 7 U/L (ref 1–33)
ANION GAP SERPL CALCULATED.3IONS-SCNC: 14.3 MMOL/L (ref 5–15)
AST SERPL-CCNC: 7 U/L (ref 1–32)
BASOPHILS # BLD AUTO: 0.02 10*3/MM3 (ref 0–0.2)
BASOPHILS NFR BLD AUTO: 0.2 % (ref 0–1.5)
BILIRUB SERPL-MCNC: 0.4 MG/DL (ref 0–1.2)
BUN SERPL-MCNC: 28 MG/DL (ref 8–23)
BUN/CREAT SERPL: 21.1 (ref 7–25)
CALCIUM SPEC-SCNC: 9.5 MG/DL (ref 8.6–10.5)
CHLORIDE SERPL-SCNC: 93 MMOL/L (ref 98–107)
CO2 SERPL-SCNC: 26.7 MMOL/L (ref 22–29)
CREAT SERPL-MCNC: 1.33 MG/DL (ref 0.57–1)
DEPRECATED RDW RBC AUTO: 54.7 FL (ref 37–54)
EGFRCR SERPLBLD CKD-EPI 2021: 45.9 ML/MIN/1.73
EOSINOPHIL # BLD AUTO: 0 10*3/MM3 (ref 0–0.4)
EOSINOPHIL NFR BLD AUTO: 0 % (ref 0.3–6.2)
ERYTHROCYTE [DISTWIDTH] IN BLOOD BY AUTOMATED COUNT: 17 % (ref 12.3–15.4)
GLOBULIN UR ELPH-MCNC: 3.1 GM/DL
GLUCOSE BLDC GLUCOMTR-MCNC: 171 MG/DL (ref 70–99)
GLUCOSE BLDC GLUCOMTR-MCNC: 315 MG/DL (ref 70–99)
GLUCOSE BLDC GLUCOMTR-MCNC: 361 MG/DL (ref 70–99)
GLUCOSE BLDC GLUCOMTR-MCNC: 416 MG/DL (ref 70–99)
GLUCOSE SERPL-MCNC: 449 MG/DL (ref 65–99)
HBA1C MFR BLD: 9.1 % (ref 4.8–5.6)
HCT VFR BLD AUTO: 45.9 % (ref 34–46.6)
HGB BLD-MCNC: 14.3 G/DL (ref 12–15.9)
IMM GRANULOCYTES # BLD AUTO: 0.06 10*3/MM3 (ref 0–0.05)
IMM GRANULOCYTES NFR BLD AUTO: 0.5 % (ref 0–0.5)
LYMPHOCYTES # BLD AUTO: 1.04 10*3/MM3 (ref 0.7–3.1)
LYMPHOCYTES NFR BLD AUTO: 8.5 % (ref 19.6–45.3)
MCH RBC QN AUTO: 28.2 PG (ref 26.6–33)
MCHC RBC AUTO-ENTMCNC: 31.2 G/DL (ref 31.5–35.7)
MCV RBC AUTO: 90.5 FL (ref 79–97)
MONOCYTES # BLD AUTO: 0.6 10*3/MM3 (ref 0.1–0.9)
MONOCYTES NFR BLD AUTO: 4.9 % (ref 5–12)
NEUTROPHILS NFR BLD AUTO: 10.53 10*3/MM3 (ref 1.7–7)
NEUTROPHILS NFR BLD AUTO: 85.9 % (ref 42.7–76)
NRBC BLD AUTO-RTO: 0 /100 WBC (ref 0–0.2)
PLATELET # BLD AUTO: 245 10*3/MM3 (ref 140–450)
PMV BLD AUTO: 11.3 FL (ref 6–12)
POTASSIUM SERPL-SCNC: 4.4 MMOL/L (ref 3.5–5.2)
PROT SERPL-MCNC: 6.9 G/DL (ref 6–8.5)
RBC # BLD AUTO: 5.07 10*6/MM3 (ref 3.77–5.28)
SODIUM SERPL-SCNC: 134 MMOL/L (ref 136–145)
WBC NRBC COR # BLD AUTO: 12.25 10*3/MM3 (ref 3.4–10.8)

## 2024-03-09 PROCEDURE — 63710000001 INSULIN DETEMIR PER 5 UNITS: Performed by: INTERNAL MEDICINE

## 2024-03-09 PROCEDURE — 63710000001 INSULIN LISPRO (HUMAN) PER 5 UNITS: Performed by: INTERNAL MEDICINE

## 2024-03-09 PROCEDURE — 25010000002 ENOXAPARIN PER 10 MG: Performed by: INTERNAL MEDICINE

## 2024-03-09 PROCEDURE — 25010000002 METHYLPREDNISOLONE PER 40 MG: Performed by: INTERNAL MEDICINE

## 2024-03-09 PROCEDURE — 94799 UNLISTED PULMONARY SVC/PX: CPT

## 2024-03-09 PROCEDURE — 25010000002 FUROSEMIDE PER 20 MG: Performed by: INTERNAL MEDICINE

## 2024-03-09 PROCEDURE — 94664 DEMO&/EVAL PT USE INHALER: CPT

## 2024-03-09 PROCEDURE — 83036 HEMOGLOBIN GLYCOSYLATED A1C: CPT | Performed by: INTERNAL MEDICINE

## 2024-03-09 PROCEDURE — 99232 SBSQ HOSP IP/OBS MODERATE 35: CPT | Performed by: INTERNAL MEDICINE

## 2024-03-09 PROCEDURE — 82948 REAGENT STRIP/BLOOD GLUCOSE: CPT

## 2024-03-09 PROCEDURE — 85025 COMPLETE CBC W/AUTO DIFF WBC: CPT | Performed by: INTERNAL MEDICINE

## 2024-03-09 PROCEDURE — 80053 COMPREHEN METABOLIC PANEL: CPT | Performed by: INTERNAL MEDICINE

## 2024-03-09 RX ORDER — NICOTINE POLACRILEX 4 MG
15 LOZENGE BUCCAL
Status: DISCONTINUED | OUTPATIENT
Start: 2024-03-09 | End: 2024-03-13 | Stop reason: HOSPADM

## 2024-03-09 RX ORDER — INSULIN LISPRO 100 [IU]/ML
10 INJECTION, SOLUTION INTRAVENOUS; SUBCUTANEOUS ONCE
Status: COMPLETED | OUTPATIENT
Start: 2024-03-09 | End: 2024-03-09

## 2024-03-09 RX ORDER — INSULIN LISPRO 100 [IU]/ML
4-24 INJECTION, SOLUTION INTRAVENOUS; SUBCUTANEOUS
Status: DISCONTINUED | OUTPATIENT
Start: 2024-03-09 | End: 2024-03-13 | Stop reason: HOSPADM

## 2024-03-09 RX ORDER — INSULIN LISPRO 100 [IU]/ML
10 INJECTION, SOLUTION INTRAVENOUS; SUBCUTANEOUS
Status: DISCONTINUED | OUTPATIENT
Start: 2024-03-09 | End: 2024-03-13 | Stop reason: HOSPADM

## 2024-03-09 RX ORDER — PREDNISONE 20 MG/1
40 TABLET ORAL
Status: DISCONTINUED | OUTPATIENT
Start: 2024-03-10 | End: 2024-03-11

## 2024-03-09 RX ORDER — DEXTROSE MONOHYDRATE 25 G/50ML
25 INJECTION, SOLUTION INTRAVENOUS
Status: DISCONTINUED | OUTPATIENT
Start: 2024-03-09 | End: 2024-03-13 | Stop reason: HOSPADM

## 2024-03-09 RX ORDER — IBUPROFEN 600 MG/1
1 TABLET ORAL
Status: DISCONTINUED | OUTPATIENT
Start: 2024-03-09 | End: 2024-03-13 | Stop reason: HOSPADM

## 2024-03-09 RX ADMIN — ENOXAPARIN SODIUM 40 MG: 100 INJECTION SUBCUTANEOUS at 08:13

## 2024-03-09 RX ADMIN — INSULIN LISPRO 16 UNITS: 100 INJECTION, SOLUTION INTRAVENOUS; SUBCUTANEOUS at 17:30

## 2024-03-09 RX ADMIN — GABAPENTIN 800 MG: 400 CAPSULE ORAL at 21:37

## 2024-03-09 RX ADMIN — IPRATROPIUM BROMIDE AND ALBUTEROL SULFATE 3 ML: .5; 3 SOLUTION RESPIRATORY (INHALATION) at 03:49

## 2024-03-09 RX ADMIN — Medication 10 ML: at 21:37

## 2024-03-09 RX ADMIN — FUROSEMIDE 40 MG: 10 INJECTION, SOLUTION INTRAMUSCULAR; INTRAVENOUS at 08:14

## 2024-03-09 RX ADMIN — HYDROCODONE BITARTRATE AND ACETAMINOPHEN 1 TABLET: 5; 325 TABLET ORAL at 14:01

## 2024-03-09 RX ADMIN — GABAPENTIN 800 MG: 400 CAPSULE ORAL at 14:00

## 2024-03-09 RX ADMIN — LUBIPROSTONE 8 MCG: 8 CAPSULE, GELATIN COATED ORAL at 08:19

## 2024-03-09 RX ADMIN — INSULIN LISPRO 4 UNITS: 100 INJECTION, SOLUTION INTRAVENOUS; SUBCUTANEOUS at 21:36

## 2024-03-09 RX ADMIN — INSULIN DETEMIR 30 UNITS: 100 INJECTION, SOLUTION SUBCUTANEOUS at 21:36

## 2024-03-09 RX ADMIN — METHYLPREDNISOLONE SODIUM SUCCINATE 40 MG: 40 INJECTION INTRAMUSCULAR; INTRAVENOUS at 02:06

## 2024-03-09 RX ADMIN — IPRATROPIUM BROMIDE AND ALBUTEROL SULFATE 3 ML: .5; 3 SOLUTION RESPIRATORY (INHALATION) at 06:38

## 2024-03-09 RX ADMIN — IPRATROPIUM BROMIDE AND ALBUTEROL SULFATE 3 ML: .5; 3 SOLUTION RESPIRATORY (INHALATION) at 13:40

## 2024-03-09 RX ADMIN — METHYLPREDNISOLONE SODIUM SUCCINATE 40 MG: 40 INJECTION INTRAMUSCULAR; INTRAVENOUS at 09:06

## 2024-03-09 RX ADMIN — HYDROCODONE BITARTRATE AND ACETAMINOPHEN 1 TABLET: 5; 325 TABLET ORAL at 21:39

## 2024-03-09 RX ADMIN — FAMOTIDINE 40 MG: 20 TABLET ORAL at 08:14

## 2024-03-09 RX ADMIN — WHITE PETROLATUM 1 APPLICATION: 1.75 OINTMENT TOPICAL at 08:18

## 2024-03-09 RX ADMIN — LUBIPROSTONE 8 MCG: 8 CAPSULE, GELATIN COATED ORAL at 21:39

## 2024-03-09 RX ADMIN — INSULIN LISPRO 20 UNITS: 100 INJECTION, SOLUTION INTRAVENOUS; SUBCUTANEOUS at 12:19

## 2024-03-09 RX ADMIN — WHITE PETROLATUM 1 APPLICATION: 1.75 OINTMENT TOPICAL at 21:38

## 2024-03-09 RX ADMIN — INSULIN DETEMIR 30 UNITS: 100 INJECTION, SOLUTION SUBCUTANEOUS at 08:13

## 2024-03-09 RX ADMIN — IPRATROPIUM BROMIDE AND ALBUTEROL SULFATE 3 ML: .5; 3 SOLUTION RESPIRATORY (INHALATION) at 19:04

## 2024-03-09 RX ADMIN — CETIRIZINE HYDROCHLORIDE 10 MG: 10 TABLET, FILM COATED ORAL at 08:14

## 2024-03-09 RX ADMIN — INSULIN LISPRO 9 UNITS: 100 INJECTION, SOLUTION INTRAVENOUS; SUBCUTANEOUS at 08:12

## 2024-03-09 RX ADMIN — INSULIN LISPRO 10 UNITS: 100 INJECTION, SOLUTION INTRAVENOUS; SUBCUTANEOUS at 17:30

## 2024-03-09 RX ADMIN — Medication 10 ML: at 08:18

## 2024-03-09 RX ADMIN — GABAPENTIN 800 MG: 400 CAPSULE ORAL at 05:43

## 2024-03-09 RX ADMIN — INSULIN LISPRO 10 UNITS: 100 INJECTION, SOLUTION INTRAVENOUS; SUBCUTANEOUS at 06:33

## 2024-03-09 RX ADMIN — HYDROCODONE BITARTRATE AND ACETAMINOPHEN 1 TABLET: 5; 325 TABLET ORAL at 05:43

## 2024-03-09 RX ADMIN — METOPROLOL SUCCINATE 50 MG: 50 TABLET, EXTENDED RELEASE ORAL at 08:14

## 2024-03-09 RX ADMIN — PRASUGREL 10 MG: 10 TABLET, FILM COATED ORAL at 08:17

## 2024-03-09 RX ADMIN — ISOSORBIDE MONONITRATE 60 MG: 60 TABLET, EXTENDED RELEASE ORAL at 21:37

## 2024-03-09 RX ADMIN — IPRATROPIUM BROMIDE AND ALBUTEROL SULFATE 3 ML: .5; 3 SOLUTION RESPIRATORY (INHALATION) at 15:59

## 2024-03-09 RX ADMIN — IPRATROPIUM BROMIDE AND ALBUTEROL SULFATE 3 ML: .5; 3 SOLUTION RESPIRATORY (INHALATION) at 00:54

## 2024-03-09 RX ADMIN — METFORMIN HYDROCHLORIDE 1000 MG: 500 TABLET, FILM COATED ORAL at 08:17

## 2024-03-09 RX ADMIN — ISOSORBIDE MONONITRATE 60 MG: 60 TABLET, EXTENDED RELEASE ORAL at 08:17

## 2024-03-09 RX ADMIN — HYDROXYZINE HYDROCHLORIDE 25 MG: 25 TABLET, FILM COATED ORAL at 08:14

## 2024-03-09 RX ADMIN — FUROSEMIDE 40 MG: 10 INJECTION, SOLUTION INTRAMUSCULAR; INTRAVENOUS at 20:07

## 2024-03-09 NOTE — PROGRESS NOTES
[facility]     Progress Note    Patient Name: Nasima Dubose  : 1965  MRN: [id number]  Primary Care Physician:  Jack Muir MD  Date of admission: 3/7/2024      Subjective   Brief summary.  Patient admitted with shortness of breath      HPI:  Vital signs stable on room air  Follow-up on shortness of breath, feeling better, less short of breath, swelling of the leg coming down.  Venous Doppler negative for DVT.  No chest pain.  Sugars running high  Intermittent confusion noted by nursing staff.  Easily redirected.  Review of Systems     No fever or chills.  High sugars.  No dizziness.  Shortness of breath better, less wheezy      Objective     Vitals:   Temp:  [97.3 °F (36.3 °C)-98.2 °F (36.8 °C)] 98.1 °F (36.7 °C)  Heart Rate:  [] 87  Resp:  [16-18] 18  BP: (128-156)/() 128/69    Physical Exam :     Elderly looking female not in acute distress.  Neck supple without any JVD.  Heart regular.  Lungs diminished breath sounds but clear today.  No rhonchi or rales.  Abdomen soft.  Nontender obese.    Extremities with edema 2+ on the right side.  Amputated right big toe, right foot dressed.     Result Review:  I have personally reviewed the results from the time of this admission to 3/9/2024 14:26 EST and agree with these findings:  [x]  Laboratory  []  Microbiology  []  Radiology  []  EKG/Telemetry   []  Cardiology/Vascular   []  Pathology  []  Old records  []  Other:           Assessment / Plan       Active Hospital Problems:  Active Hospital Problems    Diagnosis     **CHF (congestive heart failure)     Acute pulmonary edema     Onychomycosis     Onychocryptosis     Foot pain, bilateral     Right great toe amputee     Diabetic neuropathy     Uncontrolled diabetes mellitus with hyperglycemia     COPD exacerbation        Plan:   Respiratory status improving, breathing better.  Patient appears to have combination of COPD and CHF, continue steroids.  IV steroids changed to p.o  .  Also sugars  running high due to steroids will restart long-acting insulin titrate to control blood sugar, continue sliding scale coverage along with scheduled mealtime insulin.  Restart essential home meds.  Including Effient  IV Lasix.  Appreciate podiatry input  If continues to improve and remained stable possible discharge in couple of days.    DVT prophylaxis:  Medical DVT prophylaxis orders are present.        CODE STATUS:   Code Status (Patient has no pulse and is not breathing): CPR (Attempt to Resuscitate)  Medical Interventions (Patient has pulse or is breathing): Full Support        Electronically signed by Lemuel Neumann MD, 03/09/24, 2:27 PM EST.

## 2024-03-09 NOTE — PROGRESS NOTES
Respiratory Therapist Broncho-Pulmonary Hygiene Progress Note      Patient Name:  Nasima Dubose  YOB: 1965    Nasima Dubose meets the qualification for Level 1 of the Bronco-Pulmonary Hygiene Protocol. This was based on my daily patient assessment and includes review of chest x-ray results, cough ability and quality, oxygenation, secretions or risk for secretion development and patient mobility.     Broncho-Pulmonary Hygiene Assessment:    Level of Movement: Actively changing positions without assistance  Alert/ oriented/ cooperative    Breath Sounds: Clear to slightly diminished    Cough: Strong, effective    Chest X-Ray: Possible signs of consolidation and/or atelectasis or clear.     Sputum Productions: None or small amount of thin or watery secretions with effective cough    History and Physical: None    SpO2 to Oxygen Need: greater than 92% on room air or  less than 3L nasal canula    Current SpO2 is: 95 on room air    Based on this information, I have completed the following interventions: Teach/Instruct patient on cough and deep breathe      Electronically signed by Angela Herrera RRT, 03/09/24, 1:41 PM EST.

## 2024-03-09 NOTE — PLAN OF CARE
Goal Outcome Evaluation:  Plan of Care Reviewed With: patient        Progress: no change  Outcome Evaluation: VSS.  intermittent confusion, forgetfulness overnight.  Is refusing bed/chair alert.  continues with iv lasix and steroids.  dressing change completed to right foot wound per orders.  medicated for c/o pain per MAR.

## 2024-03-10 LAB
ALBUMIN SERPL-MCNC: 3.9 G/DL (ref 3.5–5.2)
ANION GAP SERPL CALCULATED.3IONS-SCNC: 13.2 MMOL/L (ref 5–15)
BASOPHILS # BLD AUTO: 0.04 10*3/MM3 (ref 0–0.2)
BASOPHILS NFR BLD AUTO: 0.3 % (ref 0–1.5)
BUN SERPL-MCNC: 42 MG/DL (ref 8–23)
BUN/CREAT SERPL: 26.9 (ref 7–25)
CALCIUM SPEC-SCNC: 9.5 MG/DL (ref 8.6–10.5)
CHLORIDE SERPL-SCNC: 95 MMOL/L (ref 98–107)
CO2 SERPL-SCNC: 30.8 MMOL/L (ref 22–29)
CREAT SERPL-MCNC: 1.56 MG/DL (ref 0.57–1)
DEPRECATED RDW RBC AUTO: 55.9 FL (ref 37–54)
EGFRCR SERPLBLD CKD-EPI 2021: 37.9 ML/MIN/1.73
EOSINOPHIL # BLD AUTO: 0.01 10*3/MM3 (ref 0–0.4)
EOSINOPHIL NFR BLD AUTO: 0.1 % (ref 0.3–6.2)
ERYTHROCYTE [DISTWIDTH] IN BLOOD BY AUTOMATED COUNT: 16.7 % (ref 12.3–15.4)
GLUCOSE BLDC GLUCOMTR-MCNC: 144 MG/DL (ref 70–99)
GLUCOSE BLDC GLUCOMTR-MCNC: 195 MG/DL (ref 70–99)
GLUCOSE BLDC GLUCOMTR-MCNC: 196 MG/DL (ref 70–99)
GLUCOSE BLDC GLUCOMTR-MCNC: 238 MG/DL (ref 70–99)
GLUCOSE SERPL-MCNC: 223 MG/DL (ref 65–99)
HCT VFR BLD AUTO: 48.8 % (ref 34–46.6)
HGB BLD-MCNC: 14.5 G/DL (ref 12–15.9)
IMM GRANULOCYTES # BLD AUTO: 0.09 10*3/MM3 (ref 0–0.05)
IMM GRANULOCYTES NFR BLD AUTO: 0.6 % (ref 0–0.5)
LYMPHOCYTES # BLD AUTO: 2.77 10*3/MM3 (ref 0.7–3.1)
LYMPHOCYTES NFR BLD AUTO: 18.6 % (ref 19.6–45.3)
MCH RBC QN AUTO: 27.6 PG (ref 26.6–33)
MCHC RBC AUTO-ENTMCNC: 29.7 G/DL (ref 31.5–35.7)
MCV RBC AUTO: 92.8 FL (ref 79–97)
MONOCYTES # BLD AUTO: 1.52 10*3/MM3 (ref 0.1–0.9)
MONOCYTES NFR BLD AUTO: 10.2 % (ref 5–12)
NEUTROPHILS NFR BLD AUTO: 10.49 10*3/MM3 (ref 1.7–7)
NEUTROPHILS NFR BLD AUTO: 70.2 % (ref 42.7–76)
NRBC BLD AUTO-RTO: 0 /100 WBC (ref 0–0.2)
PHOSPHATE SERPL-MCNC: 3.7 MG/DL (ref 2.5–4.5)
PLATELET # BLD AUTO: 267 10*3/MM3 (ref 140–450)
PMV BLD AUTO: 10.7 FL (ref 6–12)
POTASSIUM SERPL-SCNC: 4.1 MMOL/L (ref 3.5–5.2)
RBC # BLD AUTO: 5.26 10*6/MM3 (ref 3.77–5.28)
SODIUM SERPL-SCNC: 139 MMOL/L (ref 136–145)
WBC NRBC COR # BLD AUTO: 14.92 10*3/MM3 (ref 3.4–10.8)

## 2024-03-10 PROCEDURE — 82948 REAGENT STRIP/BLOOD GLUCOSE: CPT | Performed by: INTERNAL MEDICINE

## 2024-03-10 PROCEDURE — 63710000001 PREDNISONE PER 1 MG: Performed by: INTERNAL MEDICINE

## 2024-03-10 PROCEDURE — 94799 UNLISTED PULMONARY SVC/PX: CPT

## 2024-03-10 PROCEDURE — 25010000002 ENOXAPARIN PER 10 MG: Performed by: INTERNAL MEDICINE

## 2024-03-10 PROCEDURE — 25010000002 FUROSEMIDE PER 20 MG: Performed by: INTERNAL MEDICINE

## 2024-03-10 PROCEDURE — 82948 REAGENT STRIP/BLOOD GLUCOSE: CPT

## 2024-03-10 PROCEDURE — 85025 COMPLETE CBC W/AUTO DIFF WBC: CPT | Performed by: INTERNAL MEDICINE

## 2024-03-10 PROCEDURE — 63710000001 INSULIN DETEMIR PER 5 UNITS: Performed by: INTERNAL MEDICINE

## 2024-03-10 PROCEDURE — 63710000001 INSULIN LISPRO (HUMAN) PER 5 UNITS: Performed by: INTERNAL MEDICINE

## 2024-03-10 PROCEDURE — 80069 RENAL FUNCTION PANEL: CPT | Performed by: INTERNAL MEDICINE

## 2024-03-10 PROCEDURE — 99232 SBSQ HOSP IP/OBS MODERATE 35: CPT | Performed by: INTERNAL MEDICINE

## 2024-03-10 PROCEDURE — 94664 DEMO&/EVAL PT USE INHALER: CPT

## 2024-03-10 RX ORDER — IPRATROPIUM BROMIDE AND ALBUTEROL SULFATE 2.5; .5 MG/3ML; MG/3ML
3 SOLUTION RESPIRATORY (INHALATION)
Status: DISCONTINUED | OUTPATIENT
Start: 2024-03-10 | End: 2024-03-13 | Stop reason: HOSPADM

## 2024-03-10 RX ORDER — NICOTINE 21 MG/24HR
1 PATCH, TRANSDERMAL 24 HOURS TRANSDERMAL
Status: DISCONTINUED | OUTPATIENT
Start: 2024-03-11 | End: 2024-03-13 | Stop reason: HOSPADM

## 2024-03-10 RX ADMIN — INSULIN DETEMIR 30 UNITS: 100 INJECTION, SOLUTION SUBCUTANEOUS at 09:30

## 2024-03-10 RX ADMIN — Medication 10 ML: at 09:33

## 2024-03-10 RX ADMIN — METFORMIN HYDROCHLORIDE 1000 MG: 500 TABLET, FILM COATED ORAL at 09:31

## 2024-03-10 RX ADMIN — INSULIN LISPRO 10 UNITS: 100 INJECTION, SOLUTION INTRAVENOUS; SUBCUTANEOUS at 09:30

## 2024-03-10 RX ADMIN — IPRATROPIUM BROMIDE AND ALBUTEROL SULFATE 3 ML: .5; 3 SOLUTION RESPIRATORY (INHALATION) at 00:44

## 2024-03-10 RX ADMIN — HYDROXYZINE HYDROCHLORIDE 25 MG: 25 TABLET, FILM COATED ORAL at 09:32

## 2024-03-10 RX ADMIN — PRASUGREL 10 MG: 10 TABLET, FILM COATED ORAL at 09:31

## 2024-03-10 RX ADMIN — INSULIN LISPRO 4 UNITS: 100 INJECTION, SOLUTION INTRAVENOUS; SUBCUTANEOUS at 17:32

## 2024-03-10 RX ADMIN — GABAPENTIN 800 MG: 400 CAPSULE ORAL at 05:27

## 2024-03-10 RX ADMIN — IPRATROPIUM BROMIDE AND ALBUTEROL SULFATE 3 ML: .5; 3 SOLUTION RESPIRATORY (INHALATION) at 07:28

## 2024-03-10 RX ADMIN — INSULIN LISPRO 8 UNITS: 100 INJECTION, SOLUTION INTRAVENOUS; SUBCUTANEOUS at 21:19

## 2024-03-10 RX ADMIN — PREDNISONE 40 MG: 20 TABLET ORAL at 09:33

## 2024-03-10 RX ADMIN — FAMOTIDINE 40 MG: 20 TABLET ORAL at 09:32

## 2024-03-10 RX ADMIN — ISOSORBIDE MONONITRATE 60 MG: 60 TABLET, EXTENDED RELEASE ORAL at 21:02

## 2024-03-10 RX ADMIN — GABAPENTIN 800 MG: 400 CAPSULE ORAL at 15:45

## 2024-03-10 RX ADMIN — METOPROLOL SUCCINATE 50 MG: 50 TABLET, EXTENDED RELEASE ORAL at 09:32

## 2024-03-10 RX ADMIN — CETIRIZINE HYDROCHLORIDE 10 MG: 10 TABLET, FILM COATED ORAL at 09:32

## 2024-03-10 RX ADMIN — INSULIN LISPRO 10 UNITS: 100 INJECTION, SOLUTION INTRAVENOUS; SUBCUTANEOUS at 17:31

## 2024-03-10 RX ADMIN — IPRATROPIUM BROMIDE AND ALBUTEROL SULFATE 3 ML: .5; 3 SOLUTION RESPIRATORY (INHALATION) at 12:12

## 2024-03-10 RX ADMIN — ENOXAPARIN SODIUM 40 MG: 100 INJECTION SUBCUTANEOUS at 09:31

## 2024-03-10 RX ADMIN — WHITE PETROLATUM 1 APPLICATION: 1.75 OINTMENT TOPICAL at 21:05

## 2024-03-10 RX ADMIN — INSULIN DETEMIR 30 UNITS: 100 INJECTION, SOLUTION SUBCUTANEOUS at 21:19

## 2024-03-10 RX ADMIN — Medication 10 ML: at 21:02

## 2024-03-10 RX ADMIN — LUBIPROSTONE 8 MCG: 8 CAPSULE, GELATIN COATED ORAL at 09:32

## 2024-03-10 RX ADMIN — ISOSORBIDE MONONITRATE 60 MG: 60 TABLET, EXTENDED RELEASE ORAL at 09:32

## 2024-03-10 RX ADMIN — IPRATROPIUM BROMIDE AND ALBUTEROL SULFATE 3 ML: .5; 3 SOLUTION RESPIRATORY (INHALATION) at 19:12

## 2024-03-10 RX ADMIN — LUBIPROSTONE 8 MCG: 8 CAPSULE, GELATIN COATED ORAL at 21:02

## 2024-03-10 RX ADMIN — GABAPENTIN 800 MG: 400 CAPSULE ORAL at 22:06

## 2024-03-10 RX ADMIN — IPRATROPIUM BROMIDE AND ALBUTEROL SULFATE 3 ML: .5; 3 SOLUTION RESPIRATORY (INHALATION) at 04:58

## 2024-03-10 RX ADMIN — HYDROCODONE BITARTRATE AND ACETAMINOPHEN 1 TABLET: 5; 325 TABLET ORAL at 22:07

## 2024-03-10 RX ADMIN — INSULIN LISPRO 4 UNITS: 100 INJECTION, SOLUTION INTRAVENOUS; SUBCUTANEOUS at 12:40

## 2024-03-10 RX ADMIN — WHITE PETROLATUM 1 APPLICATION: 1.75 OINTMENT TOPICAL at 09:31

## 2024-03-10 RX ADMIN — INSULIN LISPRO 10 UNITS: 100 INJECTION, SOLUTION INTRAVENOUS; SUBCUTANEOUS at 12:40

## 2024-03-10 RX ADMIN — HYDROXYZINE HYDROCHLORIDE 25 MG: 25 TABLET, FILM COATED ORAL at 21:02

## 2024-03-10 RX ADMIN — FUROSEMIDE 40 MG: 10 INJECTION, SOLUTION INTRAMUSCULAR; INTRAVENOUS at 09:31

## 2024-03-10 RX ADMIN — HYDROCODONE BITARTRATE AND ACETAMINOPHEN 1 TABLET: 5; 325 TABLET ORAL at 05:27

## 2024-03-10 RX ADMIN — HYDROCODONE BITARTRATE AND ACETAMINOPHEN 1 TABLET: 5; 325 TABLET ORAL at 15:45

## 2024-03-10 NOTE — PLAN OF CARE
Goal Outcome Evaluation:  Plan of Care Reviewed With: patient        Progress: improving  Outcome Evaluation: A/O x4.  blood glucose improved with changes to insulin.  medicated for c/o foot pain per MAR.

## 2024-03-10 NOTE — PROGRESS NOTES
[facility]     Progress Note    Patient Name: Nasima Dubose  : 1965  MRN: [id number]  Primary Care Physician:  Jack Muir MD  Date of admission: 3/7/2024      Subjective   Brief summary.  Patient admitted with shortness of breath      HPI:  Vital signs stable on room air.  Patient sits in recliner with legs hanging down  Follow-up on shortness of breath, feeling better, less short of breath, swelling of the leg coming down.  Venous Doppler negative for DVT.  No chest pain.  Sugars running high  Intermittent confusion noted by nursing staff.  Easily redirected.  Review of Systems     No fever or chills.  High sugars.  No dizziness.  Shortness of breath better, less wheezy      Objective     Vitals:   Temp:  [98.1 °F (36.7 °C)-98.2 °F (36.8 °C)] 98.1 °F (36.7 °C)  Heart Rate:  [80-99] 92  Resp:  [16-24] 18  BP: (124-179)/(70-88) 144/88    Physical Exam :     Elderly looking female not in acute distress.  Neck supple without any JVD.  Heart regular.  Lungs diminished breath sounds but clear today.  No rhonchi or rales.  Abdomen soft.  Nontender obese.    Extremities with edema 2+ on the right side with mild distal edema.  Amputated right big toe, right foot dressed.     Result Review:  I have personally reviewed the results from the time of this admission to 3/10/2024 12:43 EDT and agree with these findings:  [x]  Laboratory  []  Microbiology  []  Radiology  []  EKG/Telemetry   []  Cardiology/Vascular   []  Pathology  []  Old records  []  Other:           Assessment / Plan       Active Hospital Problems:  Active Hospital Problems    Diagnosis     **CHF (congestive heart failure)     Acute pulmonary edema     Onychomycosis     Onychocryptosis     Foot pain, bilateral     Right great toe amputee     Diabetic neuropathy     Uncontrolled diabetes mellitus with hyperglycemia     COPD exacerbation    Leukocytosis.    Plan:   Respiratory status improving, breathing better.  Patient appears to have  combination of COPD and CHF, continue oral steroids for total of 3 days  Blood sugar better controlled.  Continue long-acting insulin titrate to control blood sugar, continue sliding scale coverage along with scheduled mealtime insulin.  Hemoglobin A1c of 9.1%   restarted essential home meds.  Including Effient  DC IV Lasix.  Monitor creatinine.  Baseline creatinine .9-1.  Appreciate podiatry input  Elevate legs.  Discussed with patient  If continues to improve and remained stable possible discharge in couple of days.    DVT prophylaxis:  Medical DVT prophylaxis orders are present.        CODE STATUS:   Code Status (Patient has no pulse and is not breathing): CPR (Attempt to Resuscitate)  Medical Interventions (Patient has pulse or is breathing): Full Support          Electronically signed by Lemuel Neumann MD, 03/10/24, 12:46 PM EDT.

## 2024-03-10 NOTE — PROGRESS NOTES
Respiratory Therapist Broncho-Pulmonary Hygiene Progress Note      Patient Name:  Nasima Dubose  YOB: 1965    Nasima Dubose meets the qualification for Level 1 of the Bronco-Pulmonary Hygiene Protocol. This was based on my daily patient assessment and includes review of chest x-ray results, cough ability and quality, oxygenation, secretions or risk for secretion development and patient mobility.     Broncho-Pulmonary Hygiene Assessment:    Level of Movement: Actively changing positions without assistance  Alert/ oriented/ cooperative    Breath Sounds: Diminished and/or coarse rhonchi    Cough: Strong, effective and/or frequent    Chest X-Ray: No CXR available    Sputum Productions: None or small amount of thin or watery secretions with effective cough    History and Physical: None    SpO2 to Oxygen Need: greater than 92% on room air or  less than 3L nasal canula    Current SpO2 is: 91   on RA    Based on this information, I have completed the following interventions: Aerobika with bronchodialtor medication or TID      Electronically signed by Yoanna Pino, SHEREEN, 03/10/24, 3:08 AM EDT.

## 2024-03-11 LAB
ALBUMIN SERPL-MCNC: 3.8 G/DL (ref 3.5–5.2)
ANION GAP SERPL CALCULATED.3IONS-SCNC: 15.8 MMOL/L (ref 5–15)
BASOPHILS # BLD AUTO: 0.03 10*3/MM3 (ref 0–0.2)
BASOPHILS NFR BLD AUTO: 0.3 % (ref 0–1.5)
BUN SERPL-MCNC: 41 MG/DL (ref 8–23)
BUN/CREAT SERPL: 35 (ref 7–25)
CALCIUM SPEC-SCNC: 9.1 MG/DL (ref 8.6–10.5)
CHLORIDE SERPL-SCNC: 97 MMOL/L (ref 98–107)
CO2 SERPL-SCNC: 25.2 MMOL/L (ref 22–29)
CREAT SERPL-MCNC: 1.17 MG/DL (ref 0.57–1)
DEPRECATED RDW RBC AUTO: 56.3 FL (ref 37–54)
EGFRCR SERPLBLD CKD-EPI 2021: 53.5 ML/MIN/1.73
EOSINOPHIL # BLD AUTO: 0.03 10*3/MM3 (ref 0–0.4)
EOSINOPHIL NFR BLD AUTO: 0.3 % (ref 0.3–6.2)
ERYTHROCYTE [DISTWIDTH] IN BLOOD BY AUTOMATED COUNT: 16.5 % (ref 12.3–15.4)
GLUCOSE BLDC GLUCOMTR-MCNC: 120 MG/DL (ref 70–99)
GLUCOSE BLDC GLUCOMTR-MCNC: 152 MG/DL (ref 70–99)
GLUCOSE BLDC GLUCOMTR-MCNC: 180 MG/DL (ref 70–99)
GLUCOSE BLDC GLUCOMTR-MCNC: 183 MG/DL (ref 70–99)
GLUCOSE BLDC GLUCOMTR-MCNC: 208 MG/DL (ref 70–99)
GLUCOSE SERPL-MCNC: 176 MG/DL (ref 65–99)
HCT VFR BLD AUTO: 48.9 % (ref 34–46.6)
HGB BLD-MCNC: 14.8 G/DL (ref 12–15.9)
IMM GRANULOCYTES # BLD AUTO: 0.08 10*3/MM3 (ref 0–0.05)
IMM GRANULOCYTES NFR BLD AUTO: 0.7 % (ref 0–0.5)
LYMPHOCYTES # BLD AUTO: 2.5 10*3/MM3 (ref 0.7–3.1)
LYMPHOCYTES NFR BLD AUTO: 21.2 % (ref 19.6–45.3)
MCH RBC QN AUTO: 28.2 PG (ref 26.6–33)
MCHC RBC AUTO-ENTMCNC: 30.3 G/DL (ref 31.5–35.7)
MCV RBC AUTO: 93.1 FL (ref 79–97)
MONOCYTES # BLD AUTO: 1.14 10*3/MM3 (ref 0.1–0.9)
MONOCYTES NFR BLD AUTO: 9.7 % (ref 5–12)
NEUTROPHILS NFR BLD AUTO: 67.8 % (ref 42.7–76)
NEUTROPHILS NFR BLD AUTO: 8.03 10*3/MM3 (ref 1.7–7)
NRBC BLD AUTO-RTO: 0 /100 WBC (ref 0–0.2)
PHOSPHATE SERPL-MCNC: 4 MG/DL (ref 2.5–4.5)
PLATELET # BLD AUTO: 237 10*3/MM3 (ref 140–450)
PMV BLD AUTO: 10.7 FL (ref 6–12)
POTASSIUM SERPL-SCNC: 4.5 MMOL/L (ref 3.5–5.2)
RBC # BLD AUTO: 5.25 10*6/MM3 (ref 3.77–5.28)
SODIUM SERPL-SCNC: 138 MMOL/L (ref 136–145)
WBC NRBC COR # BLD AUTO: 11.81 10*3/MM3 (ref 3.4–10.8)

## 2024-03-11 PROCEDURE — 80069 RENAL FUNCTION PANEL: CPT | Performed by: INTERNAL MEDICINE

## 2024-03-11 PROCEDURE — 97110 THERAPEUTIC EXERCISES: CPT

## 2024-03-11 PROCEDURE — 63710000001 INSULIN DETEMIR PER 5 UNITS: Performed by: INTERNAL MEDICINE

## 2024-03-11 PROCEDURE — 25010000002 ENOXAPARIN PER 10 MG: Performed by: INTERNAL MEDICINE

## 2024-03-11 PROCEDURE — 85025 COMPLETE CBC W/AUTO DIFF WBC: CPT | Performed by: INTERNAL MEDICINE

## 2024-03-11 PROCEDURE — 82948 REAGENT STRIP/BLOOD GLUCOSE: CPT | Performed by: INTERNAL MEDICINE

## 2024-03-11 PROCEDURE — 94799 UNLISTED PULMONARY SVC/PX: CPT

## 2024-03-11 PROCEDURE — 82948 REAGENT STRIP/BLOOD GLUCOSE: CPT

## 2024-03-11 PROCEDURE — 63710000001 INSULIN LISPRO (HUMAN) PER 5 UNITS: Performed by: INTERNAL MEDICINE

## 2024-03-11 PROCEDURE — 63710000001 PREDNISONE PER 1 MG: Performed by: INTERNAL MEDICINE

## 2024-03-11 PROCEDURE — 94664 DEMO&/EVAL PT USE INHALER: CPT

## 2024-03-11 RX ORDER — PREDNISONE 20 MG/1
20 TABLET ORAL
Status: COMPLETED | OUTPATIENT
Start: 2024-03-11 | End: 2024-03-12

## 2024-03-11 RX ORDER — CEPHALEXIN 500 MG/1
500 CAPSULE ORAL EVERY 8 HOURS SCHEDULED
Status: DISCONTINUED | OUTPATIENT
Start: 2024-03-11 | End: 2024-03-13 | Stop reason: HOSPADM

## 2024-03-11 RX ORDER — MINERAL OIL/HYDROPHIL PETROLAT
1 OINTMENT (GRAM) TOPICAL
Status: DISCONTINUED | OUTPATIENT
Start: 2024-03-12 | End: 2024-03-13 | Stop reason: HOSPADM

## 2024-03-11 RX ADMIN — CEPHALEXIN 500 MG: 500 CAPSULE ORAL at 15:56

## 2024-03-11 RX ADMIN — HYDROCODONE BITARTRATE AND ACETAMINOPHEN 1 TABLET: 5; 325 TABLET ORAL at 13:08

## 2024-03-11 RX ADMIN — IPRATROPIUM BROMIDE AND ALBUTEROL SULFATE 3 ML: .5; 3 SOLUTION RESPIRATORY (INHALATION) at 06:40

## 2024-03-11 RX ADMIN — ISOSORBIDE MONONITRATE 60 MG: 60 TABLET, EXTENDED RELEASE ORAL at 09:18

## 2024-03-11 RX ADMIN — INSULIN LISPRO 4 UNITS: 100 INJECTION, SOLUTION INTRAVENOUS; SUBCUTANEOUS at 22:30

## 2024-03-11 RX ADMIN — PREDNISONE 20 MG: 20 TABLET ORAL at 09:17

## 2024-03-11 RX ADMIN — CEPHALEXIN 500 MG: 500 CAPSULE ORAL at 09:26

## 2024-03-11 RX ADMIN — DOCUSATE SODIUM 50MG AND SENNOSIDES 8.6MG 1 TABLET: 8.6; 5 TABLET, FILM COATED ORAL at 09:17

## 2024-03-11 RX ADMIN — Medication 10 ML: at 22:32

## 2024-03-11 RX ADMIN — INSULIN DETEMIR 30 UNITS: 100 INJECTION, SOLUTION SUBCUTANEOUS at 22:29

## 2024-03-11 RX ADMIN — METOPROLOL SUCCINATE 50 MG: 50 TABLET, EXTENDED RELEASE ORAL at 09:17

## 2024-03-11 RX ADMIN — IPRATROPIUM BROMIDE AND ALBUTEROL SULFATE 3 ML: .5; 3 SOLUTION RESPIRATORY (INHALATION) at 11:54

## 2024-03-11 RX ADMIN — METFORMIN HYDROCHLORIDE 1000 MG: 500 TABLET, FILM COATED ORAL at 09:17

## 2024-03-11 RX ADMIN — GABAPENTIN 800 MG: 400 CAPSULE ORAL at 22:31

## 2024-03-11 RX ADMIN — CEPHALEXIN 500 MG: 500 CAPSULE ORAL at 22:31

## 2024-03-11 RX ADMIN — LUBIPROSTONE 8 MCG: 8 CAPSULE, GELATIN COATED ORAL at 09:17

## 2024-03-11 RX ADMIN — IPRATROPIUM BROMIDE AND ALBUTEROL SULFATE 3 ML: .5; 3 SOLUTION RESPIRATORY (INHALATION) at 00:00

## 2024-03-11 RX ADMIN — HYDROCODONE BITARTRATE AND ACETAMINOPHEN 1 TABLET: 5; 325 TABLET ORAL at 22:30

## 2024-03-11 RX ADMIN — Medication 10 ML: at 09:27

## 2024-03-11 RX ADMIN — INSULIN DETEMIR 30 UNITS: 100 INJECTION, SOLUTION SUBCUTANEOUS at 09:18

## 2024-03-11 RX ADMIN — INSULIN LISPRO 10 UNITS: 100 INJECTION, SOLUTION INTRAVENOUS; SUBCUTANEOUS at 17:16

## 2024-03-11 RX ADMIN — INSULIN LISPRO 10 UNITS: 100 INJECTION, SOLUTION INTRAVENOUS; SUBCUTANEOUS at 13:05

## 2024-03-11 RX ADMIN — ISOSORBIDE MONONITRATE 60 MG: 60 TABLET, EXTENDED RELEASE ORAL at 22:32

## 2024-03-11 RX ADMIN — PRASUGREL 10 MG: 10 TABLET, FILM COATED ORAL at 09:17

## 2024-03-11 RX ADMIN — INSULIN LISPRO 4 UNITS: 100 INJECTION, SOLUTION INTRAVENOUS; SUBCUTANEOUS at 07:51

## 2024-03-11 RX ADMIN — INSULIN LISPRO 8 UNITS: 100 INJECTION, SOLUTION INTRAVENOUS; SUBCUTANEOUS at 17:17

## 2024-03-11 RX ADMIN — LUBIPROSTONE 8 MCG: 8 CAPSULE, GELATIN COATED ORAL at 22:30

## 2024-03-11 RX ADMIN — HYDROCODONE BITARTRATE AND ACETAMINOPHEN 1 TABLET: 5; 325 TABLET ORAL at 05:14

## 2024-03-11 RX ADMIN — CETIRIZINE HYDROCHLORIDE 10 MG: 10 TABLET, FILM COATED ORAL at 09:26

## 2024-03-11 RX ADMIN — INSULIN LISPRO 10 UNITS: 100 INJECTION, SOLUTION INTRAVENOUS; SUBCUTANEOUS at 07:50

## 2024-03-11 RX ADMIN — GABAPENTIN 800 MG: 400 CAPSULE ORAL at 13:05

## 2024-03-11 RX ADMIN — ENOXAPARIN SODIUM 40 MG: 100 INJECTION SUBCUTANEOUS at 09:17

## 2024-03-11 RX ADMIN — GABAPENTIN 800 MG: 400 CAPSULE ORAL at 05:14

## 2024-03-11 RX ADMIN — IPRATROPIUM BROMIDE AND ALBUTEROL SULFATE 3 ML: .5; 3 SOLUTION RESPIRATORY (INHALATION) at 19:38

## 2024-03-11 RX ADMIN — FAMOTIDINE 40 MG: 20 TABLET ORAL at 09:16

## 2024-03-11 NOTE — PROGRESS NOTES
[facility]     Progress Note    Patient Name: Nasima Dubose  : 1965  MRN: [id number]  Primary Care Physician:  Jack Muir MD  Date of admission: 3/7/2024      Subjective   Brief summary.  Patient admitted with shortness of breath      HPI:      Patient seen earlier this morning, feeling much better.  Swelling improving in the leg.  Redness has improved.  No fever or chills.    .  Review of Systems     Shortness of breath better, no shortness of breath at rest.  Confusion improved.  No chest pain.        Objective     Vitals:   Temp:  [97.7 °F (36.5 °C)-99 °F (37.2 °C)] 98.1 °F (36.7 °C)  Heart Rate:  [83-95] 95  Resp:  [18-22] 20  BP: (134-157)/(75-88) 144/81    Physical Exam :     Elderly looking female not in acute distress.  Neck supple without any JVD.    Heart regular.  Lungs diminished breath sounds but clear today.  No rhonchi or rales.  Abdomen soft.  Nontender obese.   Extremities with edema right greater than left.  But improved.  Minimal redness, chronic venous stasis.     Result Review:  I have personally reviewed the results from the time of this admission to 3/11/2024 06:59 EDT and agree with these findings:  [x]  Laboratory  []  Microbiology  []  Radiology  []  EKG/Telemetry   []  Cardiology/Vascular   []  Pathology  []  Old records  []  Other:       White cell count 11.8, last blood sugar 238    Assessment / Plan       Active Hospital Problems:  Active Hospital Problems    Diagnosis     **CHF (congestive heart failure)     Acute pulmonary edema     Onychomycosis     Onychocryptosis     Foot pain, bilateral     Right great toe amputee     Diabetic neuropathy     Uncontrolled diabetes mellitus with hyperglycemia     COPD exacerbation    Leukocytosis.    Plan:     Improving.  Breathing much better.  Swelling of leg improved.  Redness better.  Add oral Keflex.  Taper prednisone.  Check labs in a.m., increase activity.  Possible discharge tomorrow    DVT prophylaxis:  Medical DVT  prophylaxis orders are present.        CODE STATUS:   Code Status (Patient has no pulse and is not breathing): CPR (Attempt to Resuscitate)  Medical Interventions (Patient has pulse or is breathing): Full Support            Electronically signed by Gordon Syed MD, 03/11/24, 7:02 AM EDT.

## 2024-03-11 NOTE — PLAN OF CARE
Goal Outcome Evaluation:              Outcome Evaluation: VSS. Medicated for neuropathy per MAR. Dressing C/D/I. Ambulating self to bathroom, urine output better. Monitoring kidney function for DC. Dorothy Reyes RN

## 2024-03-11 NOTE — SIGNIFICANT NOTE
Wound Eval / Progress Noted     [place]     Patient Name: Nasima Dubose  : 1965  MRN: [id number]  Today's Date: 3/11/2024                 Admit Date: 3/7/2024    Visit Dx:    ICD-10-CM ICD-9-CM   1. Acute on chronic congestive heart failure, unspecified heart failure type  I50.9 428.0   2. Difficulty walking  R26.2 719.7   3. Decreased activities of daily living (ADL)  Z78.9 V49.89         CHF (congestive heart failure)    COPD exacerbation    Uncontrolled diabetes mellitus with hyperglycemia    Acute pulmonary edema    Diabetic neuropathy    Onychomycosis    Onychocryptosis    Foot pain, bilateral    Right great toe amputee        Past Medical History:   Diagnosis Date    Anxiety     ANIXETY ATTACK AT WORK ABOUT A MONTH AGO    Arthritis     Condition not found     PSYCHIATRIC PROBLEMS- PT TOOK AN OVERDOSE OF MEDICATION FOR BLOOD SUGAR. TOOK 8 PILLS.    Condition not found     HERBS USED- BC POWDER AS NEEDED    Congestive heart failure (CHF)     COPD (chronic obstructive pulmonary disease)     Diabetes     Diabetic neuropathy     Health care home, active care coordination     [facility] (FAX: [phone])    History of anesthesia reaction     HARD TIME WAKING UP SOMETIME    History of hiatal hernia     History of kidney stones     Hyperlipidemia     Hypertension     Irregular heartbeat     PT IS SEEING  (RECENTLY DID A 24-HOUR HEART MONITOR).    Peripheral artery disease     Stroke     PIN STROKE FROMA MIGRAINE/ 20 YEARS AGO    Wears glasses         Past Surgical History:   Procedure Laterality Date    ABDOMINAL SURGERY      33 HERNIA REPAIR, GALLBLADER REMOVED    BLADDER REPAIR      CHOLECYSTECTOMY      DILATATION AND CURETTAGE      X2    HERNIA REPAIR      HYSTERECTOMY  1992    2 CS    TOE AMPUTATION Right 2017    RIGHT GREAT TOE    TONSILLECTOMY           Physical Assessment:  Wound 24 0605 Right posterior plantar (Active)   Wound Image   24 1056   Dressing Appearance  intact;dried drainage 03/11/24 1056   Closure None 03/11/24 1056   Base moist;pink;slough 03/11/24 1056   Periwound intact;dry; calloused  03/11/24 1056   Periwound Temperature cool 03/11/24 1056   Periwound Skin Turgor soft 03/11/24 1056   Edges open 03/11/24 1056   Wound Length (cm) 2.3 cm 03/11/24 1056   Wound Width (cm) 2 cm 03/11/24 1056   Wound Depth (cm) 0.4 cm 03/11/24 1056   Wound Surface Area (cm^2) 4.6 cm^2 03/11/24 1056   Wound Volume (cm^3) 1.84 cm^3 03/11/24 1056   Drainage Characteristics/Odor serosanguineous 03/11/24 1056   Drainage Amount scant 03/11/24 1056   Care, Wound cleansed with;sterile normal saline 03/11/24 1056   Dressing Care dressing applied;gauze;gauze, iodoform;packed with;packing strip;other (see comments) 03/11/24 1056   Periwound Care absorptive dressing applied 03/11/24 1056        Wound Check / Follow-up:  Patient seen today for a new wound care consult. Patient sitting in her recliner and is alert and oriented. Patient states she has had her right foot wound for about 7 years and that she has been treating it with gentamicin ointment at home for 4 years.  Patient states that she plans to go on a beach vacation in June and would like her wound to be healed so that she can get into the ocean.     RN assessed patient's right posterior plantar wound. Current silicone border dressing in place with dried drainage present. RN cleaned wound, measured and obtained photos. Patient's wound bed is moist, pink and with a small amount of slough lightly attached. Patient's periwound is dry, intact and calloused. RN cleansed wound with NS, blot dry and applied iodoform 1/4 inch packing strips to the wound bed, covered with gauze 4x4 and wrapped with gauze roll. Recommending to transition patient to betadine wet to moist fluffed gauze to the wound bed, cover with dry gauze or ABD and wrap with gauze roll daily.     Recommending patient keep her BLE off loading in her bed and recliner to decrease  BLE edema and swelling.     Recommending to continue current application of Aquaphor moisturizer to patient's bilateral feet daily to rehydrate skin and prevent skin breakdown or cracking.     Impression: Right foot posterior plantar wound, BLE dry, scaly cracked skin.     Short term goals:  Regain skin integrity, skin care, skin protection, daily wound care.     Evie Domingo RN    3/11/2024    16:19 EDT

## 2024-03-11 NOTE — THERAPY TREATMENT NOTE
Patient Name: Nasima Dubose  : 1965    MRN: [id number]                              Today's Date: 3/11/2024       Admit Date: 3/7/2024    Visit Dx:     ICD-10-CM ICD-9-CM   1. Acute on chronic congestive heart failure, unspecified heart failure type  I50.9 428.0   2. Difficulty walking  R26.2 719.7   3. Decreased activities of daily living (ADL)  Z78.9 V49.89     Patient Active Problem List   Diagnosis    COPD exacerbation    Multifocal pneumonia    Uncontrolled diabetes mellitus with hyperglycemia    Cytokine release syndrome, grade 2    CHF (congestive heart failure)    Acute pulmonary edema    Diabetic neuropathy    Onychomycosis    Onychocryptosis    Foot pain, bilateral    Right great toe amputee     Past Medical History:   Diagnosis Date    Anxiety     ANIXETY ATTACK AT WORK ABOUT A MONTH AGO    Arthritis     Condition not found     PSYCHIATRIC PROBLEMS- PT TOOK AN OVERDOSE OF MEDICATION FOR BLOOD SUGAR. TOOK 8 PILLS.    Condition not found     HERBS USED- BC POWDER AS NEEDED    Congestive heart failure (CHF)     COPD (chronic obstructive pulmonary disease)     Diabetes     Diabetic neuropathy     Health care home, active care coordination     [facility] (FAX: [phone])    History of anesthesia reaction     HARD TIME WAKING UP SOMETIME    History of hiatal hernia     History of kidney stones     Hyperlipidemia     Hypertension     Irregular heartbeat     PT IS SEEING  (RECENTLY DID A 24-HOUR HEART MONITOR).    Peripheral artery disease     Stroke     PIN STROKE FROMA MIGRAINE/ 20 YEARS AGO    Wears glasses      Past Surgical History:   Procedure Laterality Date    ABDOMINAL SURGERY      33 HERNIA REPAIR, GALLBLADER REMOVED    BLADDER REPAIR      CHOLECYSTECTOMY      DILATATION AND CURETTAGE      X2    HERNIA REPAIR      HYSTERECTOMY  1992    2 CS    TOE AMPUTATION Right 2017    RIGHT GREAT TOE    TONSILLECTOMY        General Information       Row Name 24 1035          OT  Time and Intention    Document Type therapy note (daily note) (P)   -     Mode of Treatment individual therapy;occupational therapy (P)   -               User Key  (r) = Recorded By, (t) = Taken By, (c) = Cosigned By      Initials Name Provider Type    Isra Cheung, OT Student OT Student                     Mobility/ADL's       Row Name 03/11/24 1035          Bed Mobility    Comment, (Bed Mobility) Patient seated in recliner upon OTs arrival (P)   -       Row Name 03/11/24 1035          Transfers    Transfers sit-stand transfer;stand-sit transfer (P)   -       Row Name 03/11/24 1035          Sit-Stand Transfer    Sit-Stand [place] (Transfers) standby assist (P)   -     Assistive Device (Sit-Stand Transfers) walker, front-wheeled (P)   -       Row Name 03/11/24 1035          Stand-Sit Transfer    Stand-Sit [place] (Transfers) standby assist (P)   -     Assistive Device (Stand-Sit Transfers) walker, front-wheeled (P)   -       Row Name 03/11/24 1035          Functional Mobility    Functional Mobility- Ind. Level standby assist (P)   -     Functional Mobility- Device walker, front-wheeled (P)   -       Row Name 03/11/24 1035          Activities of Daily Living    BADL Assessment/Intervention grooming (P)   -       Row Name 03/11/24 1035          Grooming Assessment/Training    [place] Level (Grooming) grooming skills;hair care, combing/brushing;oral care regimen;wash face, hands;set up (P)   -     Position (Grooming) supported standing;sink side (P)   -               User Key  (r) = Recorded By, (t) = Taken By, (c) = Cosigned By      Initials Name Provider Type    Isra Cheung, OT Student OT Student                   Obj/Interventions       Row Name 03/11/24 1036          Shoulder (Therapeutic Exercise)    Shoulder (Therapeutic Exercise) strengthening exercise (P)   -     Shoulder Strengthening (Therapeutic Exercise) bilateral;resistance band;yellow;2 sets;10  repetitions (P)   Overhead reach and Horizontal Abduction/Adduction  -[facility] 03/11/24 1036          Elbow/Forearm (Therapeutic Exercise)    Elbow/Forearm (Therapeutic Exercise) strengthening exercise (P)   -     Elbow/Forearm Strengthening (Therapeutic Exercise) bilateral;flexion;extension;resistance band;yellow;2 sets;10 repetitions (P)   -[facility] 03/11/24 1036          Motor Skills    Motor Skills functional endurance (P)   -     Functional Endurance Fair/Fair+ (P)   -     Therapeutic Exercise shoulder;elbow/forearm (P)   -MC       Row Name 03/11/24 1036          Balance    Balance Assessment sitting dynamic balance;standing dynamic balance (P)   -     Dynamic Sitting Balance independent (P)   -     Position, Sitting Balance supported;sitting in chair (P)   -     Dynamic Standing Balance standby assist (P)   -     Position/Device Used, Standing Balance supported;walker, front-wheeled (P)   -     Balance Interventions sit to stand;supported;dynamic;sitting;standing;occupation based/functional task;UE activity with balance activity (P)   -               User Key  (r) = Recorded By, (t) = Taken By, (c) = Cosigned By      Initials Name Provider Type     Isra Michael, OT Student OT Student                   Goals/Plan    No documentation.                  Clinical Impression       Row Name 03/11/24 1038          Pain Assessment    Additional Documentation Pain Scale: FACES Pre/Post-Treatment (Group) (P)   -MC       Row Name 03/11/24 1038          Pain Scale: FACES Pre/Post-Treatment    Pain: FACES Scale, Pretreatment 0-->no hurt (P)   -     Posttreatment Pain Rating 0-->no hurt (P)   -[facility] 03/11/24 1038          Plan of Care Review    Plan of Care Reviewed With patient (P)   -     Progress improving (P)   -     Outcome Evaluation Patient agreeable to completing grooming task at sink, and completing BUE strengthening exercises. She demonstrated fair/fair+  endurance throughout the session. She would benefit from continued skilled OT services to maximize independence. (P)   -       Row Name 03/11/24 1038          Vital Signs    O2 Delivery Pre Treatment room air (P)   -     O2 Delivery Intra Treatment room air (P)   -MC     O2 Delivery Post Treatment room air (P)   -       Row Name 03/11/24 1038          Positioning and Restraints    Pre-Treatment Position sitting in chair/recliner (P)   -MC     Post Treatment Position chair (P)   -MC     In Chair reclined;call light within reach;encouraged to call for assist (P)   -               User Key  (r) = Recorded By, (t) = Taken By, (c) = Cosigned By      Initials Name Provider Type    Isra Cheung, OT Student OT Student                   Outcome Measures       Row Name 03/11/24 1040          How much help from another is currently needed...    Putting on and taking off regular lower body clothing? 3 (P)   -     Bathing (including washing, rinsing, and drying) 3 (P)   -     Toileting (which includes using toilet bed pan or urinal) 3 (P)   -     Putting on and taking off regular upper body clothing 4 (P)   -     Taking care of personal grooming (such as brushing teeth) 4 (P)   -     Eating meals 4 (P)   -     AM-PAC 6 Clicks Score (OT) 21 (P)   -       Row Name 03/11/24 1040          Functional Assessment    Outcome Measure Options AM-PAC 6 Clicks Daily Activity (OT);Optimal Instrument (P)   -       Row Name 03/11/24 1040          Optimal Instrument    Optimal Instrument Optimal - 3 (P)   -     Bending/Stooping 2 (P)   -     Standing 1 (P)   -     Reaching 1 (P)   -     From the list, choose the 3 activities you would most like to be able to do without any difficulty Bending/stooping;Standing;Reaching (P)   -     Total Score Optimal - 3 4 (P)   -               User Key  (r) = Recorded By, (t) = Taken By, (c) = Cosigned By      Initials Name Provider Type    Isra Cheung, OT  Student OT Student                    Occupational Therapy Education       Title: PT OT SLP Therapies (In Progress)       Topic: Occupational Therapy (In Progress)       Point: ADL training (In Progress)       Description:   Instruct learner(s) on proper safety adaptation and remediation techniques during self care or transfers.   Instruct in proper use of assistive devices.                  Learning Progress Summary             Patient Acceptance, E,TB, NR by  at 3/8/2024 1353                         Point: Precautions (In Progress)       Description:   Instruct learner(s) on prescribed precautions during self-care and functional transfers.                  Learning Progress Summary             Patient Acceptance, E,TB, NR by  at 3/8/2024 1353                         Point: Body mechanics (In Progress)       Description:   Instruct learner(s) on proper positioning and spine alignment during self-care, functional mobility activities and/or exercises.                  Learning Progress Summary             Patient Acceptance, E,TB, NR by  at 3/8/2024 1353                                         User Key       Initials Effective Dates Name Provider Type Discipline     06/16/21 -  Sarah Jon OT Occupational Therapist OT                  OT Recommendation and Plan     Plan of Care Review  Plan of Care Reviewed With: (P) patient  Progress: (P) improving  Outcome Evaluation: (P) Patient agreeable to completing grooming task at sink, and completing BUE strengthening exercises. She demonstrated fair/fair+ endurance throughout the session. She would benefit from continued skilled OT services to maximize independence.     Time Calculation:         Time Calculation- OT       Row Name 03/11/24 1040             Time Calculation- OT    OT Received On 03/11/24 (P)   -      OT Goal Re-Cert Due Date 03/17/24 (P)   -         Timed Charges    33184 - OT Therapeutic Exercise Minutes 7 (P)   -      04242 - OT  Therapeutic Activity Minutes 4 (P)   -      63263 - OT Self Care/Mgmt Minutes 5 (P)   -         Total Minutes    Timed Charges Total Minutes 16 (P)   -       Total Minutes 16 (P)   -                User Key  (r) = Recorded By, (t) = Taken By, (c) = Cosigned By      Initials Name Provider Type    Isra Cheung OT Student OT Student                  Therapy Charges for Today       Code Description Service Date Service Provider Modifiers Qty    [id number]  OT THER PROC EA 15 MIN 3/11/2024 Isra Michael OT Student GO 1                 LETHA Tineo  3/11/2024

## 2024-03-12 LAB
ALBUMIN SERPL-MCNC: 3.7 G/DL (ref 3.5–5.2)
ALBUMIN/GLOB SERPL: 1.3 G/DL
ALP SERPL-CCNC: 70 U/L (ref 39–117)
ALT SERPL W P-5'-P-CCNC: 13 U/L (ref 1–33)
ANION GAP SERPL CALCULATED.3IONS-SCNC: 11 MMOL/L (ref 5–15)
AST SERPL-CCNC: 14 U/L (ref 1–32)
BACTERIA UR QL AUTO: ABNORMAL /HPF
BASOPHILS # BLD AUTO: 0.03 10*3/MM3 (ref 0–0.2)
BASOPHILS NFR BLD AUTO: 0.3 % (ref 0–1.5)
BILIRUB SERPL-MCNC: 0.3 MG/DL (ref 0–1.2)
BILIRUB UR QL STRIP: NEGATIVE
BUN SERPL-MCNC: 41 MG/DL (ref 8–23)
BUN/CREAT SERPL: 33.3 (ref 7–25)
CALCIUM SPEC-SCNC: 9.1 MG/DL (ref 8.6–10.5)
CHLORIDE SERPL-SCNC: 99 MMOL/L (ref 98–107)
CLARITY UR: CLEAR
CO2 SERPL-SCNC: 30 MMOL/L (ref 22–29)
COLOR UR: YELLOW
CREAT SERPL-MCNC: 1.23 MG/DL (ref 0.57–1)
DEPRECATED RDW RBC AUTO: 54.8 FL (ref 37–54)
EGFRCR SERPLBLD CKD-EPI 2021: 50.4 ML/MIN/1.73
EOSINOPHIL # BLD AUTO: 0.08 10*3/MM3 (ref 0–0.4)
EOSINOPHIL NFR BLD AUTO: 0.8 % (ref 0.3–6.2)
ERYTHROCYTE [DISTWIDTH] IN BLOOD BY AUTOMATED COUNT: 16 % (ref 12.3–15.4)
GLOBULIN UR ELPH-MCNC: 2.8 GM/DL
GLUCOSE BLDC GLUCOMTR-MCNC: 131 MG/DL (ref 70–99)
GLUCOSE BLDC GLUCOMTR-MCNC: 164 MG/DL (ref 70–99)
GLUCOSE BLDC GLUCOMTR-MCNC: 170 MG/DL (ref 70–99)
GLUCOSE BLDC GLUCOMTR-MCNC: 178 MG/DL (ref 70–99)
GLUCOSE SERPL-MCNC: 185 MG/DL (ref 65–99)
GLUCOSE UR STRIP-MCNC: NEGATIVE MG/DL
HCT VFR BLD AUTO: 49.9 % (ref 34–46.6)
HGB BLD-MCNC: 15 G/DL (ref 12–15.9)
HGB UR QL STRIP.AUTO: ABNORMAL
HYALINE CASTS UR QL AUTO: ABNORMAL /LPF
IMM GRANULOCYTES # BLD AUTO: 0.06 10*3/MM3 (ref 0–0.05)
IMM GRANULOCYTES NFR BLD AUTO: 0.6 % (ref 0–0.5)
KETONES UR QL STRIP: NEGATIVE
LEUKOCYTE ESTERASE UR QL STRIP.AUTO: ABNORMAL
LYMPHOCYTES # BLD AUTO: 2.64 10*3/MM3 (ref 0.7–3.1)
LYMPHOCYTES NFR BLD AUTO: 25.8 % (ref 19.6–45.3)
MCH RBC QN AUTO: 27.8 PG (ref 26.6–33)
MCHC RBC AUTO-ENTMCNC: 30.1 G/DL (ref 31.5–35.7)
MCV RBC AUTO: 92.4 FL (ref 79–97)
MONOCYTES # BLD AUTO: 0.9 10*3/MM3 (ref 0.1–0.9)
MONOCYTES NFR BLD AUTO: 8.8 % (ref 5–12)
NEUTROPHILS NFR BLD AUTO: 6.53 10*3/MM3 (ref 1.7–7)
NEUTROPHILS NFR BLD AUTO: 63.7 % (ref 42.7–76)
NITRITE UR QL STRIP: NEGATIVE
NRBC BLD AUTO-RTO: 0 /100 WBC (ref 0–0.2)
PH UR STRIP.AUTO: 7 [PH] (ref 5–8)
PHOSPHATE SERPL-MCNC: 3.8 MG/DL (ref 2.5–4.5)
PLATELET # BLD AUTO: 214 10*3/MM3 (ref 140–450)
PMV BLD AUTO: 10.8 FL (ref 6–12)
POTASSIUM SERPL-SCNC: 4.4 MMOL/L (ref 3.5–5.2)
PROT SERPL-MCNC: 6.5 G/DL (ref 6–8.5)
PROT UR QL STRIP: ABNORMAL
RBC # BLD AUTO: 5.4 10*6/MM3 (ref 3.77–5.28)
RBC # UR STRIP: ABNORMAL /HPF
REF LAB TEST METHOD: ABNORMAL
SODIUM SERPL-SCNC: 140 MMOL/L (ref 136–145)
SP GR UR STRIP: 1.02 (ref 1–1.03)
SQUAMOUS #/AREA URNS HPF: ABNORMAL /HPF
UROBILINOGEN UR QL STRIP: ABNORMAL
WBC # UR STRIP: ABNORMAL /HPF
WBC NRBC COR # BLD AUTO: 10.24 10*3/MM3 (ref 3.4–10.8)

## 2024-03-12 PROCEDURE — 94664 DEMO&/EVAL PT USE INHALER: CPT

## 2024-03-12 PROCEDURE — 25010000002 FUROSEMIDE PER 20 MG: Performed by: INTERNAL MEDICINE

## 2024-03-12 PROCEDURE — 87086 URINE CULTURE/COLONY COUNT: CPT | Performed by: INTERNAL MEDICINE

## 2024-03-12 PROCEDURE — 94799 UNLISTED PULMONARY SVC/PX: CPT

## 2024-03-12 PROCEDURE — 97110 THERAPEUTIC EXERCISES: CPT

## 2024-03-12 PROCEDURE — 25010000002 ENOXAPARIN PER 10 MG: Performed by: INTERNAL MEDICINE

## 2024-03-12 PROCEDURE — 84100 ASSAY OF PHOSPHORUS: CPT | Performed by: INTERNAL MEDICINE

## 2024-03-12 PROCEDURE — 85025 COMPLETE CBC W/AUTO DIFF WBC: CPT | Performed by: INTERNAL MEDICINE

## 2024-03-12 PROCEDURE — 63710000001 PREDNISONE PER 1 MG: Performed by: INTERNAL MEDICINE

## 2024-03-12 PROCEDURE — 80053 COMPREHEN METABOLIC PANEL: CPT | Performed by: INTERNAL MEDICINE

## 2024-03-12 PROCEDURE — 82948 REAGENT STRIP/BLOOD GLUCOSE: CPT

## 2024-03-12 PROCEDURE — 81001 URINALYSIS AUTO W/SCOPE: CPT | Performed by: INTERNAL MEDICINE

## 2024-03-12 PROCEDURE — 63710000001 INSULIN DETEMIR PER 5 UNITS: Performed by: INTERNAL MEDICINE

## 2024-03-12 PROCEDURE — 63710000001 INSULIN LISPRO (HUMAN) PER 5 UNITS: Performed by: INTERNAL MEDICINE

## 2024-03-12 RX ORDER — FUROSEMIDE 10 MG/ML
20 INJECTION INTRAMUSCULAR; INTRAVENOUS ONCE
Status: COMPLETED | OUTPATIENT
Start: 2024-03-12 | End: 2024-03-12

## 2024-03-12 RX ORDER — HYDROXYZINE HYDROCHLORIDE 25 MG/1
25 TABLET, FILM COATED ORAL 3 TIMES DAILY PRN
Status: DISCONTINUED | OUTPATIENT
Start: 2024-03-12 | End: 2024-03-13 | Stop reason: HOSPADM

## 2024-03-12 RX ADMIN — IPRATROPIUM BROMIDE AND ALBUTEROL SULFATE 3 ML: .5; 3 SOLUTION RESPIRATORY (INHALATION) at 01:19

## 2024-03-12 RX ADMIN — Medication 1 APPLICATION: at 09:27

## 2024-03-12 RX ADMIN — INSULIN LISPRO 4 UNITS: 100 INJECTION, SOLUTION INTRAVENOUS; SUBCUTANEOUS at 17:45

## 2024-03-12 RX ADMIN — CEPHALEXIN 500 MG: 500 CAPSULE ORAL at 21:56

## 2024-03-12 RX ADMIN — INSULIN LISPRO 10 UNITS: 100 INJECTION, SOLUTION INTRAVENOUS; SUBCUTANEOUS at 17:45

## 2024-03-12 RX ADMIN — METFORMIN HYDROCHLORIDE 1000 MG: 500 TABLET, FILM COATED ORAL at 08:18

## 2024-03-12 RX ADMIN — INSULIN DETEMIR 30 UNITS: 100 INJECTION, SOLUTION SUBCUTANEOUS at 08:17

## 2024-03-12 RX ADMIN — LUBIPROSTONE 8 MCG: 8 CAPSULE, GELATIN COATED ORAL at 08:19

## 2024-03-12 RX ADMIN — GABAPENTIN 800 MG: 400 CAPSULE ORAL at 13:35

## 2024-03-12 RX ADMIN — HYDROXYZINE HYDROCHLORIDE 25 MG: 25 TABLET, FILM COATED ORAL at 08:17

## 2024-03-12 RX ADMIN — Medication 10 ML: at 09:26

## 2024-03-12 RX ADMIN — INSULIN LISPRO 4 UNITS: 100 INJECTION, SOLUTION INTRAVENOUS; SUBCUTANEOUS at 08:20

## 2024-03-12 RX ADMIN — HYDROXYZINE HYDROCHLORIDE 25 MG: 25 TABLET, FILM COATED ORAL at 15:16

## 2024-03-12 RX ADMIN — IPRATROPIUM BROMIDE AND ALBUTEROL SULFATE 3 ML: .5; 3 SOLUTION RESPIRATORY (INHALATION) at 18:35

## 2024-03-12 RX ADMIN — Medication 10 ML: at 21:57

## 2024-03-12 RX ADMIN — GABAPENTIN 800 MG: 400 CAPSULE ORAL at 05:07

## 2024-03-12 RX ADMIN — LUBIPROSTONE 8 MCG: 8 CAPSULE, GELATIN COATED ORAL at 21:55

## 2024-03-12 RX ADMIN — CEPHALEXIN 500 MG: 500 CAPSULE ORAL at 05:07

## 2024-03-12 RX ADMIN — INSULIN LISPRO 10 UNITS: 100 INJECTION, SOLUTION INTRAVENOUS; SUBCUTANEOUS at 08:17

## 2024-03-12 RX ADMIN — ISOSORBIDE MONONITRATE 60 MG: 60 TABLET, EXTENDED RELEASE ORAL at 08:19

## 2024-03-12 RX ADMIN — INSULIN DETEMIR 30 UNITS: 100 INJECTION, SOLUTION SUBCUTANEOUS at 21:55

## 2024-03-12 RX ADMIN — FAMOTIDINE 40 MG: 20 TABLET ORAL at 08:17

## 2024-03-12 RX ADMIN — METOPROLOL SUCCINATE 50 MG: 50 TABLET, EXTENDED RELEASE ORAL at 08:17

## 2024-03-12 RX ADMIN — HYDROCODONE BITARTRATE AND ACETAMINOPHEN 1 TABLET: 5; 325 TABLET ORAL at 02:23

## 2024-03-12 RX ADMIN — CEPHALEXIN 500 MG: 500 CAPSULE ORAL at 13:35

## 2024-03-12 RX ADMIN — GABAPENTIN 800 MG: 400 CAPSULE ORAL at 21:56

## 2024-03-12 RX ADMIN — ENOXAPARIN SODIUM 40 MG: 100 INJECTION SUBCUTANEOUS at 09:25

## 2024-03-12 RX ADMIN — HYDROCODONE BITARTRATE AND ACETAMINOPHEN 1 TABLET: 5; 325 TABLET ORAL at 21:55

## 2024-03-12 RX ADMIN — FUROSEMIDE 20 MG: 10 INJECTION, SOLUTION INTRAMUSCULAR; INTRAVENOUS at 16:08

## 2024-03-12 RX ADMIN — ISOSORBIDE MONONITRATE 60 MG: 60 TABLET, EXTENDED RELEASE ORAL at 21:56

## 2024-03-12 RX ADMIN — IPRATROPIUM BROMIDE AND ALBUTEROL SULFATE 3 ML: .5; 3 SOLUTION RESPIRATORY (INHALATION) at 12:08

## 2024-03-12 RX ADMIN — INSULIN LISPRO 4 UNITS: 100 INJECTION, SOLUTION INTRAVENOUS; SUBCUTANEOUS at 21:55

## 2024-03-12 RX ADMIN — HYDROCODONE BITARTRATE AND ACETAMINOPHEN 1 TABLET: 5; 325 TABLET ORAL at 13:35

## 2024-03-12 RX ADMIN — PRASUGREL 10 MG: 10 TABLET, FILM COATED ORAL at 08:18

## 2024-03-12 RX ADMIN — ACETAMINOPHEN 650 MG: 325 TABLET ORAL at 05:07

## 2024-03-12 RX ADMIN — CETIRIZINE HYDROCHLORIDE 10 MG: 10 TABLET, FILM COATED ORAL at 08:17

## 2024-03-12 RX ADMIN — PREDNISONE 20 MG: 20 TABLET ORAL at 08:20

## 2024-03-12 RX ADMIN — INSULIN LISPRO 10 UNITS: 100 INJECTION, SOLUTION INTRAVENOUS; SUBCUTANEOUS at 13:35

## 2024-03-12 RX ADMIN — IPRATROPIUM BROMIDE AND ALBUTEROL SULFATE 3 ML: .5; 3 SOLUTION RESPIRATORY (INHALATION) at 07:44

## 2024-03-12 NOTE — PLAN OF CARE
Goal Outcome Evaluation:  Plan of Care Reviewed With: patient        Progress: no change  Outcome Evaluation: shift uneventful. pain controlled. pt up in room ad ajay, has refused bed alert per her pt rights.Freda Pimentel RN

## 2024-03-12 NOTE — THERAPY TREATMENT NOTE
Patient Name: Nasima Dubose  : 1965    MRN: [id number]                              Today's Date: 3/12/2024       Admit Date: 3/7/2024    Visit Dx:     ICD-10-CM ICD-9-CM   1. Acute on chronic congestive heart failure, unspecified heart failure type  I50.9 428.0   2. Difficulty walking  R26.2 719.7   3. Decreased activities of daily living (ADL)  Z78.9 V49.89     Patient Active Problem List   Diagnosis    COPD exacerbation    Multifocal pneumonia    Uncontrolled diabetes mellitus with hyperglycemia    Cytokine release syndrome, grade 2    CHF (congestive heart failure)    Acute pulmonary edema    Diabetic neuropathy    Onychomycosis    Onychocryptosis    Foot pain, bilateral    Right great toe amputee     Past Medical History:   Diagnosis Date    Anxiety     ANIXETY ATTACK AT WORK ABOUT A MONTH AGO    Arthritis     Condition not found     PSYCHIATRIC PROBLEMS- PT TOOK AN OVERDOSE OF MEDICATION FOR BLOOD SUGAR. TOOK 8 PILLS.    Condition not found     HERBS USED- BC POWDER AS NEEDED    Congestive heart failure (CHF)     COPD (chronic obstructive pulmonary disease)     Diabetes     Diabetic neuropathy     Health care home, active care coordination     [facility] (FAX: [phone])    History of anesthesia reaction     HARD TIME WAKING UP SOMETIME    History of hiatal hernia     History of kidney stones     Hyperlipidemia     Hypertension     Irregular heartbeat     PT IS SEEING  (RECENTLY DID A 24-HOUR HEART MONITOR).    Peripheral artery disease     Stroke     PIN STROKE FROMA MIGRAINE/ 20 YEARS AGO    Wears glasses      Past Surgical History:   Procedure Laterality Date    ABDOMINAL SURGERY      33 HERNIA REPAIR, GALLBLADER REMOVED    BLADDER REPAIR      CHOLECYSTECTOMY      DILATATION AND CURETTAGE      X2    HERNIA REPAIR      HYSTERECTOMY  1992    2 CS    TOE AMPUTATION Right 2017    RIGHT GREAT TOE    TONSILLECTOMY        General Information       Row Name 24 1029          OT  Time and Intention    Document Type therapy note (daily note) (P)   -     Mode of Treatment individual therapy;occupational therapy (P)   -               User Key  (r) = Recorded By, (t) = Taken By, (c) = Cosigned By      Initials Name Provider Type    Isra Cheung, OT Student OT Student                     Mobility/ADL's    No documentation.                  Obj/Interventions       Row Name 03/12/24 1030          Shoulder (Therapeutic Exercise)    Shoulder (Therapeutic Exercise) strengthening exercise (P)   -     Shoulder Strengthening (Therapeutic Exercise) bilateral;resistance band;yellow (P)   2x13- Overhead reach and Horizontal abduction/adduction  -[facility] 03/12/24 1030          Elbow/Forearm (Therapeutic Exercise)    Elbow/Forearm (Therapeutic Exercise) strengthening exercise (P)   -     Elbow/Forearm Strengthening (Therapeutic Exercise) bilateral;flexion;extension (P)   2x13  -MC       Row Name 03/12/24 1030          Motor Skills    Motor Skills functional endurance (P)   -     Functional Endurance Fair+ (P)   -     Therapeutic Exercise shoulder;elbow/forearm (P)   -               User Key  (r) = Recorded By, (t) = Taken By, (c) = Cosigned By      Initials Name Provider Type    Isra Cheung, OT Student OT Student                   Goals/Plan    No documentation.                  Clinical Impression       Row Name 03/12/24 1031          Pain Assessment    Additional Documentation Pain Scale: FACES Pre/Post-Treatment (Group) (P)   -MC       Row Name 03/12/24 1031          Pain Scale: FACES Pre/Post-Treatment    Pain: FACES Scale, Pretreatment 0-->no hurt (P)   -     Posttreatment Pain Rating 0-->no hurt (P)   -MC       Row Name 03/12/24 1031          Plan of Care Review    Plan of Care Reviewed With patient (P)   -     Progress improving (P)   -     Outcome Evaluation Patient progressed to 2 sets of 13 reps BUE strengthening exercises, while demonstrating fair+  endurance throughout the session. She would benefit from continued skilled OT services to maximize independence. (P)   -       Row Name 03/12/24 1031          Vital Signs    O2 Delivery Pre Treatment room air (P)   -     O2 Delivery Intra Treatment room air (P)   -     O2 Delivery Post Treatment room air (P)   -       Row Name 03/12/24 1031          Positioning and Restraints    Pre-Treatment Position sitting in chair/recliner (P)   -MC     Post Treatment Position chair (P)   -MC     In Chair reclined;call light within reach;encouraged to call for assist (P)   -               User Key  (r) = Recorded By, (t) = Taken By, (c) = Cosigned By      Initials Name Provider Type    Isra Cheung, OT Student OT Student                   Outcome Measures       Row Name 03/12/24 1032          How much help from another is currently needed...    Putting on and taking off regular lower body clothing? 3 (P)   -MC     Bathing (including washing, rinsing, and drying) 3 (P)   -MC     Toileting (which includes using toilet bed pan or urinal) 3 (P)   -MC     Putting on and taking off regular upper body clothing 4 (P)   -MC     Taking care of personal grooming (such as brushing teeth) 4 (P)   -MC     Eating meals 4 (P)   -     AM-PAC 6 Clicks Score (OT) 21 (P)   -       Row Name 03/12/24 0900          How much help from another person do you currently need...    Turning from your back to your side while in flat bed without using bedrails? 4  -KH     Moving from lying on back to sitting on the side of a flat bed without bedrails? 4  -KH     Moving to and from a bed to a chair (including a wheelchair)? 4  -KH     Standing up from a chair using your arms (e.g., wheelchair, bedside chair)? 4  -KH     Climbing 3-5 steps with a railing? 4  -KH     To walk in hospital room? 4  -KH     AM-PAC 6 Clicks Score (PT) 24  -KH     Highest Level of Mobility Goal 8 --> Walked 250 feet or more  -       Row Name 03/12/24 1032           Functional Assessment    Outcome Measure Options AM-PAC 6 Clicks Daily Activity (OT);Optimal Instrument (P)   -MC       Row Name 03/12/24 1032          Optimal Instrument    Optimal Instrument Optimal - 3 (P)   -MC     Bending/Stooping 2 (P)   -MC     Standing 1 (P)   -     Reaching 1 (P)   -     From the list, choose the 3 activities you would most like to be able to do without any difficulty Bending/stooping;Standing;Reaching (P)   -     Total Score Optimal - 3 4 (P)   -               User Key  (r) = Recorded By, (t) = Taken By, (c) = Cosigned By      Initials Name Provider Type     Angelic Martinez, RN Registered Nurse    Isra Cheung, LETHA Student OT Student                    Occupational Therapy Education       Title: PT OT SLP Therapies (In Progress)       Topic: Occupational Therapy (In Progress)       Point: ADL training (In Progress)       Description:   Instruct learner(s) on proper safety adaptation and remediation techniques during self care or transfers.   Instruct in proper use of assistive devices.                  Learning Progress Summary             Patient Acceptance, E,TB, NR by  at 3/8/2024 1353                         Point: Precautions (In Progress)       Description:   Instruct learner(s) on prescribed precautions during self-care and functional transfers.                  Learning Progress Summary             Patient Acceptance, E,TB, NR by  at 3/8/2024 1353                         Point: Body mechanics (In Progress)       Description:   Instruct learner(s) on proper positioning and spine alignment during self-care, functional mobility activities and/or exercises.                  Learning Progress Summary             Patient Acceptance, E,TB, NR by  at 3/8/2024 1353                                         User Key       Initials Effective Dates Name Provider Type [facility] 06/16/21 -  Sarah Jon, OT Occupational Therapist OT                  OT  Recommendation and Plan     Plan of Care Review  Plan of Care Reviewed With: (P) patient  Progress: (P) improving  Outcome Evaluation: (P) Patient progressed to 2 sets of 13 reps BUE strengthening exercises, while demonstrating fair+ endurance throughout the session. She would benefit from continued skilled OT services to maximize independence.     Time Calculation:         Time Calculation- OT       Row Name 03/12/24 1033             Time Calculation- OT    OT Received On 03/12/24 (P)   -      OT Goal Re-Cert Due Date 03/17/24 (P)   -         Timed Charges    75223 - OT Therapeutic Exercise Minutes 14 (P)   -         Total Minutes    Timed Charges Total Minutes 14 (P)   -       Total Minutes 14 (P)   -                User Key  (r) = Recorded By, (t) = Taken By, (c) = Cosigned By      Initials Name Provider Type    Isra Cheung OT Student OT Student                  Therapy Charges for Today       Code Description Service Date Service Provider Modifiers Qty    [id number]  OT THER PROC EA 15 MIN 3/11/2024 Isra Michael OT Student GO 1    [id number]  OT THER PROC EA 15 MIN 3/12/2024 Isra Michael OT Student GO 1                 LETHA Tineo  3/12/2024

## 2024-03-12 NOTE — PROGRESS NOTES
[facility]     Progress Note    Patient Name: Nasima [facility]  : 1965  MRN: [id number]  Primary Care Physician:  Jack Muir MD  Date of admission: 3/7/2024      Subjective   Brief summary.  Patient admitted with shortness of breath      HPI:      Slightly increased shortness of breath today.  Patient did not rest well.  Diuretics on hold because of elevated creatinine and BUN.  Patient also anxious.  RN reports some concentrated urine    .  Review of Systems     Increasing shortness of breath no chest pain, concentrated urine no burning.      Objective     Vitals:   Temp:  [97.3 °F (36.3 °C)-98.2 °F (36.8 °C)] 97.9 °F (36.6 °C)  Heart Rate:  [72-99] 91  Resp:  [16-20] 20  BP: (136-151)/(86-98) 136/93    Physical Exam :     Elderly looking female not in acute distress.  Neck supple without any JVD.    Heart regular.    Lungs with few crackles at bases..  Abdomen soft.  Nontender obese.   Extremities with edema right greater than left..     Result Review:  I have personally reviewed the results from the time of this admission to 3/12/2024 15:59 EDT and agree with these findings:  [x]  Laboratory  []  Microbiology  []  Radiology  []  EKG/Telemetry   []  Cardiology/Vascular   []  Pathology  []  Old records  []  Other:        Assessment / Plan       Active Hospital Problems:  Active Hospital Problems    Diagnosis     **CHF (congestive heart failure)     Acute pulmonary edema     Onychomycosis     Onychocryptosis     Foot pain, bilateral     Right great toe amputee     Diabetic neuropathy     Uncontrolled diabetes mellitus with hyperglycemia     COPD exacerbation    Leukocytosis.    Plan:     Patient slightly and short of breath, restart Lasix 1 dose IV today and reassess in a.m. with creatinine, monitor symptoms.  Urine analysis for concentrated urine already on antibiotics Keflex.   possible discharge tomorrow    DVT prophylaxis:  Medical DVT prophylaxis orders are present.        CODE STATUS:   Code  Status (Patient has no pulse and is not breathing): CPR (Attempt to Resuscitate)  Medical Interventions (Patient has pulse or is breathing): Full Support              Electronically signed by Gordon Syed MD, 03/12/24, 4:02 PM EDT.

## 2024-03-12 NOTE — PLAN OF CARE
Goal Outcome Evaluation:               VSS, pt c/o SOA and increased anxiety this shift. PRN anxiety medication ordered and given, pt stated was effective. Call light within reach will continue to monitor.

## 2024-03-13 ENCOUNTER — READMISSION MANAGEMENT (OUTPATIENT)
Dept: CALL CENTER | Facility: HOSPITAL | Age: 59
End: 2024-03-13
Payer: MEDICARE

## 2024-03-13 VITALS
WEIGHT: 192.24 LBS | RESPIRATION RATE: 18 BRPM | SYSTOLIC BLOOD PRESSURE: 138 MMHG | TEMPERATURE: 98.1 F | HEIGHT: 65 IN | DIASTOLIC BLOOD PRESSURE: 81 MMHG | OXYGEN SATURATION: 92 % | BODY MASS INDEX: 32.03 KG/M2 | HEART RATE: 81 BPM

## 2024-03-13 PROBLEM — J81.0 ACUTE PULMONARY EDEMA: Status: RESOLVED | Noted: 2024-03-07 | Resolved: 2024-03-13

## 2024-03-13 PROBLEM — Z89.411 RIGHT GREAT TOE AMPUTEE: Status: RESOLVED | Noted: 2024-03-07 | Resolved: 2024-03-13

## 2024-03-13 PROBLEM — J44.1 COPD EXACERBATION: Status: RESOLVED | Noted: 2022-01-14 | Resolved: 2024-03-13

## 2024-03-13 LAB
ALBUMIN SERPL-MCNC: 4 G/DL (ref 3.5–5.2)
ANION GAP SERPL CALCULATED.3IONS-SCNC: 11.9 MMOL/L (ref 5–15)
BUN SERPL-MCNC: 43 MG/DL (ref 8–23)
BUN/CREAT SERPL: 40.2 (ref 7–25)
CALCIUM SPEC-SCNC: 9.2 MG/DL (ref 8.6–10.5)
CHLORIDE SERPL-SCNC: 101 MMOL/L (ref 98–107)
CO2 SERPL-SCNC: 25.1 MMOL/L (ref 22–29)
CREAT SERPL-MCNC: 1.07 MG/DL (ref 0.57–1)
EGFRCR SERPLBLD CKD-EPI 2021: 59.6 ML/MIN/1.73
GLUCOSE BLDC GLUCOMTR-MCNC: 154 MG/DL (ref 70–99)
GLUCOSE BLDC GLUCOMTR-MCNC: 173 MG/DL (ref 70–99)
GLUCOSE SERPL-MCNC: 265 MG/DL (ref 65–99)
PHOSPHATE SERPL-MCNC: 4.2 MG/DL (ref 2.5–4.5)
POTASSIUM SERPL-SCNC: 4.8 MMOL/L (ref 3.5–5.2)
SODIUM SERPL-SCNC: 138 MMOL/L (ref 136–145)
WHOLE BLOOD HOLD SPECIMEN: NORMAL

## 2024-03-13 PROCEDURE — 94664 DEMO&/EVAL PT USE INHALER: CPT

## 2024-03-13 PROCEDURE — 82948 REAGENT STRIP/BLOOD GLUCOSE: CPT | Performed by: INTERNAL MEDICINE

## 2024-03-13 PROCEDURE — 94799 UNLISTED PULMONARY SVC/PX: CPT

## 2024-03-13 PROCEDURE — 25010000002 ENOXAPARIN PER 10 MG: Performed by: INTERNAL MEDICINE

## 2024-03-13 PROCEDURE — 63710000001 INSULIN LISPRO (HUMAN) PER 5 UNITS: Performed by: INTERNAL MEDICINE

## 2024-03-13 PROCEDURE — 25010000002 FUROSEMIDE PER 20 MG: Performed by: INTERNAL MEDICINE

## 2024-03-13 PROCEDURE — 63710000001 INSULIN DETEMIR PER 5 UNITS: Performed by: INTERNAL MEDICINE

## 2024-03-13 PROCEDURE — 80069 RENAL FUNCTION PANEL: CPT | Performed by: INTERNAL MEDICINE

## 2024-03-13 RX ORDER — CEPHALEXIN 500 MG/1
500 CAPSULE ORAL EVERY 8 HOURS SCHEDULED
Qty: 14 CAPSULE | Refills: 0 | Status: SHIPPED | OUTPATIENT
Start: 2024-03-13 | End: 2024-03-13

## 2024-03-13 RX ORDER — NICOTINE 21 MG/24HR
1 PATCH, TRANSDERMAL 24 HOURS TRANSDERMAL
Qty: 30 PATCH | Refills: 0 | Status: SHIPPED | OUTPATIENT
Start: 2024-03-14 | End: 2024-04-13

## 2024-03-13 RX ORDER — CEPHALEXIN 500 MG/1
500 CAPSULE ORAL EVERY 8 HOURS SCHEDULED
Qty: 14 CAPSULE | Refills: 0 | Status: SHIPPED | OUTPATIENT
Start: 2024-03-13 | End: 2024-03-18

## 2024-03-13 RX ORDER — FUROSEMIDE 10 MG/ML
20 INJECTION INTRAMUSCULAR; INTRAVENOUS ONCE
Status: COMPLETED | OUTPATIENT
Start: 2024-03-13 | End: 2024-03-13

## 2024-03-13 RX ORDER — NICOTINE 21 MG/24HR
1 PATCH, TRANSDERMAL 24 HOURS TRANSDERMAL
Qty: 30 PATCH | Refills: 0 | Status: SHIPPED | OUTPATIENT
Start: 2024-03-14 | End: 2024-03-13

## 2024-03-13 RX ADMIN — IPRATROPIUM BROMIDE AND ALBUTEROL SULFATE 3 ML: .5; 3 SOLUTION RESPIRATORY (INHALATION) at 02:12

## 2024-03-13 RX ADMIN — LUBIPROSTONE 8 MCG: 8 CAPSULE, GELATIN COATED ORAL at 08:22

## 2024-03-13 RX ADMIN — Medication 1 APPLICATION: at 09:06

## 2024-03-13 RX ADMIN — INSULIN DETEMIR 30 UNITS: 100 INJECTION, SOLUTION SUBCUTANEOUS at 08:21

## 2024-03-13 RX ADMIN — IPRATROPIUM BROMIDE AND ALBUTEROL SULFATE 3 ML: .5; 3 SOLUTION RESPIRATORY (INHALATION) at 07:49

## 2024-03-13 RX ADMIN — FUROSEMIDE 20 MG: 10 INJECTION, SOLUTION INTRAMUSCULAR; INTRAVENOUS at 09:06

## 2024-03-13 RX ADMIN — INSULIN LISPRO 10 UNITS: 100 INJECTION, SOLUTION INTRAVENOUS; SUBCUTANEOUS at 08:21

## 2024-03-13 RX ADMIN — ENOXAPARIN SODIUM 40 MG: 100 INJECTION SUBCUTANEOUS at 08:21

## 2024-03-13 RX ADMIN — INSULIN LISPRO 4 UNITS: 100 INJECTION, SOLUTION INTRAVENOUS; SUBCUTANEOUS at 08:20

## 2024-03-13 RX ADMIN — METOPROLOL SUCCINATE 50 MG: 50 TABLET, EXTENDED RELEASE ORAL at 08:22

## 2024-03-13 RX ADMIN — PRASUGREL 10 MG: 10 TABLET, FILM COATED ORAL at 08:22

## 2024-03-13 RX ADMIN — ISOSORBIDE MONONITRATE 60 MG: 60 TABLET, EXTENDED RELEASE ORAL at 08:22

## 2024-03-13 RX ADMIN — CETIRIZINE HYDROCHLORIDE 10 MG: 10 TABLET, FILM COATED ORAL at 08:22

## 2024-03-13 RX ADMIN — METFORMIN HYDROCHLORIDE 1000 MG: 500 TABLET, FILM COATED ORAL at 08:22

## 2024-03-13 RX ADMIN — FAMOTIDINE 40 MG: 20 TABLET ORAL at 08:22

## 2024-03-13 RX ADMIN — Medication 10 ML: at 08:23

## 2024-03-13 RX ADMIN — GABAPENTIN 800 MG: 400 CAPSULE ORAL at 05:46

## 2024-03-13 RX ADMIN — CEPHALEXIN 500 MG: 500 CAPSULE ORAL at 05:46

## 2024-03-13 RX ADMIN — HYDROCODONE BITARTRATE AND ACETAMINOPHEN 1 TABLET: 5; 325 TABLET ORAL at 05:46

## 2024-03-13 NOTE — PLAN OF CARE
Goal Outcome Evaluation:  Plan of Care Reviewed With: patient        Progress: improving  Outcome Evaluation: SHIFT UNEVENTFUL. PT UP A SOFIA IN ROOM, HAS REFUSED BED ALERT.Freda Pimentel RN

## 2024-03-13 NOTE — PLAN OF CARE
Goal Outcome Evaluation:      Education complete. Pt discharging.  Vicenta Allison RN

## 2024-03-13 NOTE — DISCHARGE SUMMARY
[facility]         DISCHARGE SUMMARY    Patient Name: Nasima [facility]  : 1965  MRN: [id number]    Date of Admission: 3/7/2024  Date of Discharge: 2024  Primary Care Physician: Jack Muir MD    Consults       Date and Time Order Name Status Description    3/7/2024 12:59 AM IP General Consult (Use specialty-specific consult if known) Completed             Presenting Problem:   CHF (congestive heart failure) [I50.9]  Acute on chronic congestive heart failure, unspecified heart failure type [I50.9]    Active and Resolved Hospital Problems:  Active Hospital Problems    Diagnosis POA    **CHF (congestive heart failure) [I50.9] Yes    Onychomycosis [B35.1] Yes    Onychocryptosis [L60.0] Yes    Foot pain, bilateral [M79.671, M79.672] Yes    Right great toe amputee [Z89.411] Not Applicable    Diabetic neuropathy [E11.40] Yes    Uncontrolled diabetes mellitus with hyperglycemia [E11.65] Yes      Resolved Hospital Problems    Diagnosis POA    Acute pulmonary edema [J81.0] Yes    COPD exacerbation [J44.1] Yes         Hospital Course     Hospital Course:  Nasima [facility] is a 58 y.o. female admitted to hospital for increasing shortness of breath, combination of COPD exacerbation along with pulmonary edema and CHF.  Patient admitted to monitored bed started on IV diuretics along with the steroids and neb treatment.    Patient also had some edema of the right lower extremity DVT was ruled out but patient reports this edema is there for several months and years, patient had minimal redness to it, venous Doppler was negative.    She was continued on antibiotics and steroids, tapered over time.  Diuretics were adjusted accordingly.    Patient feels much better.  She is back to baseline and comfortable.  Not short of breath with exertion.  She will be discharged home today.  She was also seen by podiatrist for toenail care.    Patient was also discharged on oral antibiotics as well as  nicotine patch advised not to smoke..        DISCHARGE Follow Up Recommendations for labs and diagnostics:   Discharge to home with outpatient follow-up      Day of Discharge     Vital Signs:  Temp:  [97.5 °F (36.4 °C)-98.1 °F (36.7 °C)] 98.1 °F (36.7 °C)  Heart Rate:  [] 77  Resp:  [16-20] 18  BP: (135-155)/(68-93) 141/70    Physical Exam:    Well-developed well-nourished female not in acute distress.  Heart regular.  Lungs diminished breath sound but clear.  No rhonchi or rales.  Abdomen soft.  Extremities 1+ edema on right lower extremity improved from admission day.  No calf tenderness      Pertinent  and/or Most Recent Results     LAB RESULTS:      Lab 03/12/24 0413 03/11/24  0438 03/10/24  0419 03/09/24  0425 03/07/24  0755   WBC 10.24 11.81* 14.92* 12.25* 9.27   HEMOGLOBIN 15.0 14.8 14.5 14.3 13.9   HEMATOCRIT 49.9* 48.9* 48.8* 45.9 45.6   PLATELETS 214 237 267 245 236   NEUTROS ABS 6.53 8.03* 10.49* 10.53* 6.28   IMMATURE GRANS (ABS) 0.06* 0.08* 0.09* 0.06* 0.03   LYMPHS ABS 2.64 2.50 2.77 1.04 2.08   MONOS ABS 0.90 1.14* 1.52* 0.60 0.69   EOS ABS 0.08 0.03 0.01 0.00 0.13   MCV 92.4 93.1 92.8 90.5 90.3         Lab 03/13/24  0428 03/12/24  0413 03/11/24  0653 03/10/24  0419 03/09/24  0425   SODIUM 138 140 138 139 134*   POTASSIUM 4.8 4.4 4.5 4.1 4.4   CHLORIDE 101 99 97* 95* 93*   CO2 25.1 30.0* 25.2 30.8* 26.7   ANION GAP 11.9 11.0 15.8* 13.2 14.3   BUN 43* 41* 41* 42* 28*   CREATININE 1.07* 1.23* 1.17* 1.56* 1.33*   EGFR 59.6* 50.4* 53.5* 37.9* 45.9*   GLUCOSE 265* 185* 176* 223* 449*   CALCIUM 9.2 9.1 9.1 9.5 9.5   PHOSPHORUS 4.2 3.8 4.0 3.7  --    HEMOGLOBIN A1C  --   --   --   --  9.10*         Lab 03/13/24  0428 03/12/24 0413 03/11/24  0653 03/10/24  0419 03/09/24  0425 03/07/24  0755 03/06/24  2125   TOTAL PROTEIN  --  6.5  --   --  6.9 6.8 7.1   ALBUMIN 4.0 3.7 3.8 3.9 3.8 3.7 3.8   GLOBULIN  --  2.8  --   --  3.1 3.1 3.3   ALT (SGPT)  --  13  --   --  7 7 9   AST (SGOT)  --  14  --   --  7 11  10   BILIRUBIN  --  0.3  --   --  0.4 1.2 1.2   ALK PHOS  --  70  --   --  86 93 94         Lab 03/06/24  2125   PROBNP 13,647.0*   HSTROP T 18*                 Brief Urine Lab Results  (Last result in the past 365 days)        Color   Clarity   Blood   Leuk Est   Nitrite   Protein   CREAT   Urine HCG        03/12/24 1425 Yellow   Clear   Trace   Moderate (2+)   Negative   >=300 mg/dL (3+)                 Microbiology Results (last 10 days)       Procedure Component Value - Date/Time    COVID-19, FLU A/B, RSV PCR 1 HR TAT - Swab, Nasopharynx [[id number]]  (Normal) Collected: 03/06/24 2118    Lab Status: Final result Specimen: Swab from Nasopharynx Updated: 03/06/24 2211     COVID19 Not Detected     Influenza A PCR Not Detected     Influenza B PCR Not Detected     RSV, PCR Not Detected    Narrative:      Fact sheet for providers: https://www.fda.gov/media/747778/download    Fact sheet for patients: https://www.fda.gov/media/613896/download    Test performed by PCR.            PROCEDURES:    XR Chest 1 View    Result Date: 3/6/2024  Impression:  Cardiomegaly and vascular congestion with evidence of mild edema in the lower lung zones.  Additionally, there may be a trace amount of right pleural fluid.       BENJAMIN MARTINEZ MD       Electronically Signed and Approved By: BENJAMIN MARTINEZ MD on 3/06/2024 at 21:40              Results for orders placed during the hospital encounter of 03/07/24    Duplex Venous Lower Extremity - Bilateral CAR    Interpretation Summary    Normal bilateral lower extremity venous duplex scan.      Results for orders placed during the hospital encounter of 03/07/24    Duplex Venous Lower Extremity - Bilateral CAR    Interpretation Summary    Normal bilateral lower extremity venous duplex scan.          Labs Pending at Discharge:  Pending Labs       Order Current Status    Urine Culture - Urine, Urine, Random Void In process              Discharge Details        Discharge Medications        New  Medications        Instructions Start Date   cephalexin 500 MG capsule  Commonly known as: KEFLEX   500 mg, Oral, Every 8 Hours Scheduled      nicotine 21 MG/24HR patch  Commonly known as: NICODERM CQ   1 patch, Transdermal, Every 24 Hours Scheduled   Start Date: March 14, 2024            Continue These Medications        Instructions Start Date   albuterol sulfate  (90 Base) MCG/ACT inhaler  Commonly known as: PROVENTIL HFA;VENTOLIN HFA;PROAIR HFA   2 puffs, Inhalation, Every 6 Hours PRN      B-D ULTRAFINE III SHORT PEN 31G X 8 MM misc  Generic drug: Insulin Pen Needle   USE AS DIRECTED INTRAMUCULARLY FOUR TIMES DAILY FOR 90 DAYS      Fluticasone-Salmeterol 250-50 MCG/ACT DISKUS  Commonly known as: ADVAIR/WIXELA   1 puff, Inhalation, 2 Times Daily - RT      furosemide 40 MG tablet  Commonly known as: LASIX   40 mg, Oral, 2 Times Daily      gabapentin 800 MG tablet  Commonly known as: NEURONTIN   800 mg, Oral, 3 Times Daily      gentamicin 0.1 % ointment  Commonly known as: GARAMYCIN   Apply 1 Application topically to the appropriate area as directed 4 (Four) Times a Day.      HYDROcodone-acetaminophen 7.5-325 MG per tablet  Commonly known as: NORCO   1 tablet, Oral, Every 6 Hours      hydrOXYzine 25 MG tablet  Commonly known as: ATARAX   Take 1 tablet by mouth 2 (Two) Times a Day.      Insulin Degludec 200 UNIT/ML solution pen-injector pen injection  Commonly known as: TRESIBA FLEXTOUCH   68 Units, Subcutaneous, Nightly      isosorbide mononitrate 60 MG 24 hr tablet  Commonly known as: IMDUR   60 mg, Oral, 2 Times Daily      Linzess 145 MCG capsule capsule  Generic drug: linaclotide   145 mcg, Oral, Every Morning Before Breakfast      loratadine 10 MG tablet  Commonly known as: CLARITIN   10 mg, Oral, Daily      metFORMIN 1000 MG tablet  Commonly known as: GLUCOPHAGE   1,000 mg, Oral, 2 times daily      metoprolol succinate XL 50 MG 24 hr tablet  Commonly known as: TOPROL-XL   1 tablet, Oral, Daily       NovoLOG FlexPen 100 UNIT/ML solution pen-injector sc pen  Generic drug: insulin aspart   10 Units, Subcutaneous, 3 Times Daily With Meals      prasugrel 10 MG tablet  Commonly known as: EFFIENT   10 mg, Oral, Daily      simvastatin 20 MG tablet  Commonly known as: ZOCOR   20 mg, Oral, Daily With Breakfast      Vitamin C 500 MG capsule   500 mg, Oral, Daily               Allergies   Allergen Reactions    Aminoglycosides Unknown - Low Severity    Neosporin [Neomycin-Bacitracin Zn-Polymyx] Itching and Rash    Polymyxin B Unknown - Low Severity    Pramoxine Unknown - Low Severity         Discharge Disposition:    Home or Self Care    Diet:    Heart healthy, 1800 ADA    Discharge Activity:     Activity Instructions       Activity as Tolerated              No future appointments.    Additional Instructions for the Follow-ups that You Need to Schedule       Discharge Follow-up with PCP   As directed       Currently Documented PCP:    Jack Muir MD    PCP Phone Number:    [phone]     Follow Up Details: Next week                Time spent on Discharge including face to face service: 30 minutes.            I have dictated this note utilizing Dragon Dictation.             Please note that portions of this note were completed with a voice recognition program.             Part of this note may be an electronic transcription/translation of spoken language to printed text         using the Dragon Dictation System.       Electronically signed by Gordon Syed MD, 03/13/24, 9:48 AM EDT.

## 2024-03-14 LAB — BACTERIA SPEC AEROBE CULT: NORMAL

## 2024-03-14 NOTE — OUTREACH NOTE
Prep Survey      Flowsheet Row Responses   [facility] facility patient discharged from? [facility]   Is LACE score < 7 ? No   Eligibility Readm Mgmt   Discharge diagnosis CHF   Does the patient have one of the following disease processes/diagnoses(primary or secondary)? CHF   Does the patient have Home health ordered? Yes   What is the Home health agency?  [facility]   Is there a DME ordered? No   Prep survey completed? Yes            JAMES RUGGIERO - Registered Nurse

## 2024-03-19 ENCOUNTER — READMISSION MANAGEMENT (OUTPATIENT)
Dept: CALL CENTER | Facility: HOSPITAL | Age: 59
End: 2024-03-19
Payer: MEDICARE

## 2024-03-19 NOTE — OUTREACH NOTE
CHF Week 1 Survey      Flowsheet Row Responses   [facility] patient discharged from? [facility]   Does the patient have one of the following disease processes/diagnoses(primary or secondary)? CHF   CHF Week 1 attempt successful? Yes   Call start time 1415   Call end time 1421   Discharge diagnosis CHF   Person spoke with today (if not patient) and relationship patient   Meds reviewed with patient/caregiver? Yes   Is the patient having any side effects they believe may be caused by any medication additions or changes? No   Does the patient have all medications ordered at discharge? Yes   Prescription comments 2 more days of antibiotic   Is the patient taking all medications as directed (includes completed medication regime)? Yes   Does the patient have a primary care provider?  Yes   Does the patient have an appointment with their PCP within 7 days of discharge? Yes   Comments regarding PCP PCP tomorrow   Has the patient kept scheduled appointments due by today? Yes   What is the Home health agency?  [facility]   Has home health visited the patient within 72 hours of discharge? Yes   Home health comments HH is visiting   Pulse Ox monitoring None   Psychosocial issues? No   Did the patient receive a copy of their discharge instructions? Yes   Nursing interventions Reviewed instructions with patient   What is the patient's perception of their health status since discharge? Improving   Is the patient/caregiver able to teach back the hierarchy of who to call/visit for symptoms/problems? PCP, Specialist, Home health nurse, Urgent Care, ED, 911 Yes   Is the patient able to teach back Heart Failure Zones? Yes   CHF Zone this Call Green Zone   Green Zone Patient reports doing well, No new or worsening shortness of breath, No new swelling -  feet, ankles and legs look normal for you, Weight check stable    CHF Week 1 call completed? Yes   Is the patient interested in additional calls from an ambulatory ? No    Would this patient benefit from a Referral to [facility] Social Work? No   Wrap up additional comments Pt reports she is doing well at this time.  She continues to have a cough but will see PCP tomorrow.  She denies swelling/weight gain.  No needs.   Call end time 1421            JAMES RUGGIERO - Registered Nurse

## 2024-03-28 ENCOUNTER — READMISSION MANAGEMENT (OUTPATIENT)
Dept: CALL CENTER | Facility: HOSPITAL | Age: 59
End: 2024-03-28
Payer: MEDICARE

## 2024-03-28 NOTE — OUTREACH NOTE
CHF Week 2 Survey      Flowsheet Row Responses   [facility] facility patient discharged from? [facility]   Does the patient have one of the following disease processes/diagnoses(primary or secondary)? CHF   Week 2 attempt successful? No   Unsuccessful attempts Attempt 1  [All numbers listed were attempted-no answer]            Eva H - Registered Nurse

## 2024-04-02 ENCOUNTER — READMISSION MANAGEMENT (OUTPATIENT)
Dept: CALL CENTER | Facility: HOSPITAL | Age: 59
End: 2024-04-02
Payer: MEDICARE

## 2024-04-02 NOTE — OUTREACH NOTE
CHF Week 2 Survey      Flowsheet Row Responses   [facility] facility patient discharged from? [facility]   Does the patient have one of the following disease processes/diagnoses(primary or secondary)? CHF   Week 2 attempt successful? No   Unsuccessful attempts Attempt 2            Nicole CHAPARRO - Registered Nurse

## 2024-04-10 ENCOUNTER — READMISSION MANAGEMENT (OUTPATIENT)
Dept: CALL CENTER | Facility: HOSPITAL | Age: 59
End: 2024-04-10
Payer: MEDICARE

## 2024-04-10 NOTE — OUTREACH NOTE
CHF Week 3 Survey      Flowsheet Row Responses   [facility] facility patient discharged from? [facility]   Does the patient have one of the following disease processes/diagnoses(primary or secondary)? CHF   Week 3 attempt successful? No   Unsuccessful attempts Attempt 1            Nicole CHAPARRO - Registered Nurse